# Patient Record
Sex: FEMALE | Race: WHITE | NOT HISPANIC OR LATINO | Employment: FULL TIME | URBAN - METROPOLITAN AREA
[De-identification: names, ages, dates, MRNs, and addresses within clinical notes are randomized per-mention and may not be internally consistent; named-entity substitution may affect disease eponyms.]

---

## 2019-08-07 DIAGNOSIS — R11.0 NAUSEA: Primary | ICD-10-CM

## 2019-08-07 RX ORDER — ONDANSETRON 8 MG/1
8 TABLET, ORALLY DISINTEGRATING ORAL EVERY 8 HOURS PRN
Qty: 90 TABLET | Refills: 0 | Status: SHIPPED | OUTPATIENT
Start: 2019-08-07 | End: 2020-02-12 | Stop reason: SDUPTHER

## 2019-09-26 RX ORDER — ZOLPIDEM TARTRATE 10 MG/1
10 TABLET ORAL NIGHTLY PRN
COMMUNITY
End: 2019-12-18

## 2019-09-26 RX ORDER — HYOSCYAMINE SULFATE 0.125 MG
TABLET,DISINTEGRATING ORAL EVERY 4 HOURS PRN
COMMUNITY
End: 2019-09-30

## 2019-09-26 RX ORDER — DULOXETIN HYDROCHLORIDE 30 MG/1
30 CAPSULE, DELAYED RELEASE ORAL DAILY
COMMUNITY
End: 2019-09-30 | Stop reason: SDUPTHER

## 2019-09-26 RX ORDER — DIPHENOXYLATE HYDROCHLORIDE AND ATROPINE SULFATE 2.5; .025 MG/1; MG/1
1 TABLET ORAL 4 TIMES DAILY PRN
COMMUNITY
End: 2020-05-06 | Stop reason: SDUPTHER

## 2019-09-26 RX ORDER — ARIPIPRAZOLE 10 MG/1
10 TABLET ORAL DAILY
COMMUNITY
End: 2020-12-31

## 2019-09-26 RX ORDER — DEXLANSOPRAZOLE 60 MG/1
60 CAPSULE, DELAYED RELEASE ORAL DAILY
COMMUNITY
End: 2019-09-30 | Stop reason: SDUPTHER

## 2019-09-26 RX ORDER — CLONAZEPAM 1 MG/1
1 TABLET ORAL 2 TIMES DAILY PRN
COMMUNITY
End: 2020-12-31

## 2019-09-26 RX ORDER — PROMETHAZINE HYDROCHLORIDE 25 MG/1
25 TABLET ORAL EVERY 6 HOURS PRN
COMMUNITY
End: 2019-09-30 | Stop reason: SDUPTHER

## 2019-09-26 RX ORDER — OXYBUTYNIN CHLORIDE 10 MG/1
10 TABLET, EXTENDED RELEASE ORAL DAILY
COMMUNITY
End: 2019-09-30

## 2019-09-30 ENCOUNTER — OFFICE VISIT (OUTPATIENT)
Dept: FAMILY MEDICINE CLINIC | Facility: CLINIC | Age: 59
End: 2019-09-30

## 2019-09-30 VITALS
BODY MASS INDEX: 17.99 KG/M2 | TEMPERATURE: 98.1 F | SYSTOLIC BLOOD PRESSURE: 120 MMHG | HEART RATE: 71 BPM | HEIGHT: 64 IN | WEIGHT: 105.4 LBS | DIASTOLIC BLOOD PRESSURE: 78 MMHG | OXYGEN SATURATION: 98 %

## 2019-09-30 DIAGNOSIS — R53.82 CHRONIC FATIGUE: ICD-10-CM

## 2019-09-30 DIAGNOSIS — F33.41 RECURRENT MAJOR DEPRESSIVE DISORDER, IN PARTIAL REMISSION (HCC): ICD-10-CM

## 2019-09-30 DIAGNOSIS — K21.9 GERD WITHOUT ESOPHAGITIS: Primary | ICD-10-CM

## 2019-09-30 DIAGNOSIS — Z87.891 HISTORY OF TOBACCO ABUSE: ICD-10-CM

## 2019-09-30 DIAGNOSIS — Z79.899 HIGH RISK MEDICATION USE: ICD-10-CM

## 2019-09-30 DIAGNOSIS — F41.1 GAD (GENERALIZED ANXIETY DISORDER): ICD-10-CM

## 2019-09-30 DIAGNOSIS — F51.01 PRIMARY INSOMNIA: ICD-10-CM

## 2019-09-30 PROCEDURE — 99214 OFFICE O/P EST MOD 30 MIN: CPT | Performed by: FAMILY MEDICINE

## 2019-09-30 RX ORDER — DULOXETIN HYDROCHLORIDE 30 MG/1
CAPSULE, DELAYED RELEASE ORAL
Qty: 90 CAPSULE | Refills: 5
Start: 2019-09-30 | End: 2021-06-07

## 2019-09-30 RX ORDER — PROMETHAZINE HYDROCHLORIDE 25 MG/1
25 TABLET ORAL EVERY 8 HOURS PRN
Qty: 45 TABLET | Refills: 5 | Status: SHIPPED | OUTPATIENT
Start: 2019-09-30 | End: 2021-02-17

## 2019-09-30 RX ORDER — DEXLANSOPRAZOLE 60 MG/1
60 CAPSULE, DELAYED RELEASE ORAL DAILY
Qty: 90 CAPSULE | Refills: 3 | Status: SHIPPED | OUTPATIENT
Start: 2019-09-30 | End: 2021-06-07 | Stop reason: SDUPTHER

## 2019-09-30 NOTE — PROGRESS NOTES
Chief Complaint   Patient presents with   • Weight Loss   • Fatigue   • Med Refill       Subjective   Christie Hendricks is a 58 y.o. female.     History of Present Illness   F/u VIKTOR  Doing well with meds.  No SE.   F/U JANEY with depression.  Doing well with meds.  Seeing psychiatry(Dr Herndon).  On clonazepam 1mg TID now through him.  I am functioning well now.  I am going through a divorce now.  I only eat one meal a day.  I try to snack a lot, though.    I have gained wt as I was 89 and now I am 89.    C/o fatigued often.  Going on a few months now.    F/u tobacco abuse.  I quit smoking 5 years ago.       The following portions of the patient's history were reviewed and updated as appropriate: allergies, current medications, past family history, past medical history, past social history, past surgical history and problem list.    Review of Systems   Constitutional: Positive for fatigue.   Respiratory: Positive for cough and shortness of breath. Negative for chest tightness.    Cardiovascular: Negative for chest pain and leg swelling.       Patient Active Problem List   Diagnosis   • Vitamin B deficiency   • Rectal sphincter incontinence   • Raynaud's phenomenon   • Primary insomnia   • Pill dysphagia   • Persistent insomnia   • Overactive bladder   • Pancreatic disorder   • Non-ulcer dyspepsia   • Irritable bowel syndrome with diarrhea   • Insomnia   • IBS (irritable bowel syndrome)   • Hyperactivity of bladder   • History of colon polyps   • GERD without esophagitis   • JANEY (generalized anxiety disorder)   • Former smoker   • Fatigue   • Esophageal spasm   • Elevated amylase   • Dysphasia   • Depression   • Circadian rhythm sleep disorder   • Carpal tunnel syndrome   • Anxiety   • Allergic rhinitis   • Adrenal gland anomaly       Allergies   Allergen Reactions   • Penicillins Unknown (See Comments)     unknown         Current Outpatient Medications:   •  ARIPiprazole (ABILIFY) 10 MG tablet, Take 10 mg by mouth  Daily., Disp: , Rfl:   •  clonazePAM (KlonoPIN) 1 MG tablet, Take 1 mg by mouth 2 (Two) Times a Day As Needed., Disp: , Rfl:   •  dexlansoprazole (DEXILANT) 60 MG capsule, Take 60 mg by mouth Daily., Disp: , Rfl:   •  diphenoxylate-atropine (LOMOTIL) 2.5-0.025 MG per tablet, Take 1 tablet by mouth 4 (Four) Times a Day As Needed., Disp: , Rfl:   •  DULoxetine (CYMBALTA) 30 MG capsule, 3 a day., Disp: 90 capsule, Rfl: 5  •  ondansetron ODT (ZOFRAN-ODT) 8 MG disintegrating tablet, Take 1 tablet by mouth Every 8 (Eight) Hours As Needed for Nausea or Vomiting., Disp: 90 tablet, Rfl: 0  •  promethazine (PHENERGAN) 25 MG tablet, Take 25 mg by mouth Every 6 (Six) Hours As Needed., Disp: , Rfl:   •  zolpidem (AMBIEN) 10 MG tablet, Take 10 mg by mouth At Night As Needed., Disp: , Rfl:     Past Medical History:   Diagnosis Date   • Abdominal cramping    • Abdominal pain, epigastric    • Abdominal pain, lower    • Acute bronchitis    • Acute frontal sinusitis     History of    • Acute gastroenteritis    • Acute sinusitis    • Adrenal gland anomaly    • Allergic rhinitis    • Anxiety    • Bloating    • Body aches    • Bowel incontinence    • Carpal tunnel syndrome    • Circadian rhythm sleep disorder    • Cough    • Decreased appetite    • Depression    • Diarrhea    • Dysphasia    • Elevated amylase    • Esophageal spasm    • Fatigue    • Former smoker    • JANEY (generalized anxiety disorder)    • GERD without esophagitis    • High risk medication use    • History of colon polyps    • Hyperactivity of bladder    • IBS (irritable bowel syndrome)    • Insomnia    • Irritable bowel syndrome with diarrhea    • Nausea    • Non-ulcer dyspepsia    • Overactive bladder    • Pancreatic disorder    • Persistent insomnia    • Pill dysphagia    • Primary insomnia    • Raynaud's phenomenon    • Rectal itching    • Rectal sphincter incontinence    • Shingles    • Vitamin B deficiency    • Weight loss        Past Surgical History:   Procedure  Laterality Date   • CHOLECYSTECTOMY     • COLONOSCOPY  2015    13 polyps removed   • TOOTH EXTRACTION     • UPPER GASTROINTESTINAL ENDOSCOPY      [facility]  - normal per patient       Family History   Problem Relation Age of Onset   • No Known Problems Mother    • No Known Problems Father    • No Known Problems Other        Social History     Tobacco Use   • Smoking status: Former Smoker     Packs/day: 1.00     Years: 30.00     Pack years: 30.00     Types: Cigarettes     Last attempt to quit: 2014     Years since quittin.0   • Smokeless tobacco: Never Used   Substance Use Topics   • Alcohol use: Yes     Comment: social alcohol use            Objective     Vitals:    19 1114   BP: 120/78   Pulse: 71   Temp: 98.1 °F (36.7 °C)   SpO2: 98%     Body mass index is 18.09 kg/m².    Physical Exam   Constitutional: She is oriented to person, place, and time. She appears well-developed and well-nourished.   HENT:   Head: Normocephalic and atraumatic.   Right Ear: External ear normal.   Left Ear: External ear normal.   Nose: Nose normal.   Mouth/Throat: Oropharynx is clear and moist.   Eyes: Conjunctivae and EOM are normal. Pupils are equal, round, and reactive to light.   Neck: Normal range of motion. Neck supple. No tracheal deviation present. No thyromegaly present.   Cardiovascular: Normal rate, regular rhythm and normal heart sounds. Exam reveals no gallop and no friction rub.   No murmur heard.  Pulmonary/Chest: Effort normal and breath sounds normal. No respiratory distress. She exhibits no tenderness.   Abdominal: Soft. Bowel sounds are normal. She exhibits no distension. There is no tenderness.   Musculoskeletal: Normal range of motion. She exhibits no edema or tenderness.   Lymphadenopathy:     She has no cervical adenopathy.   Neurological: She is alert and oriented to person, place, and time. She displays normal reflexes. No cranial nerve deficit or sensory deficit. Coordination  normal.   Skin: Skin is warm and dry.   Psychiatric: She has a normal mood and affect. Her behavior is normal. Judgment and thought content normal.   Nursing note and vitals reviewed.      No results found for: GLUCOSE, BUN, CREATININE, EGFRIFNONA, EGFRIFAFRI, BCR, K, CO2, CALCIUM, PROTENTOTREF, ALBUMIN, LABIL2, AST, ALT    No results found for: WBC, RBC, HGB, HCT, MCV, MCH, MCHC, RDW, RDWSD, MPV, PLT, NEUTRORELPCT, LYMPHORELPCT, MONORELPCT, EOSRELPCT, BASORELPCT, AUTOIGPER, NEUTROABS, LYMPHSABS, MONOSABS, EOSABS, BASOSABS, AUTOIGNUM, NRBC    No results found for: HGBA1C    No results found for: [id number]    No results found for: TSH    No results found for: CHOL  No results found for: TRIG  No results found for: HDL  No results found for: LDL  No results found for: VLDL  No results found for: LDLHDL      Procedures    Assessment/Plan   Problems Addressed this Visit        Digestive    GERD without esophagitis - Primary    Relevant Medications    dexlansoprazole (DEXILANT) 60 MG capsule       Other    Insomnia    JANEY (generalized anxiety disorder)    Relevant Medications    zolpidem (AMBIEN) 10 MG tablet    ARIPiprazole (ABILIFY) 10 MG tablet    DULoxetine (CYMBALTA) 30 MG capsule    Fatigue    Relevant Orders    Comprehensive Metabolic Panel    CBC & Differential    TSH Rfx On Abnormal To Free T4    Vitamin B12    Depression    Relevant Medications    zolpidem (AMBIEN) 10 MG tablet    ARIPiprazole (ABILIFY) 10 MG tablet    DULoxetine (CYMBALTA) 30 MG capsule      Other Visit Diagnoses     History of tobacco abuse        Relevant Orders    CT Chest Low Dose Wo    High risk medication use        Relevant Orders    Lipid Panel With / Chol / HDL Ratio          Orders Placed This Encounter   Procedures   • CT Chest Low Dose Wo     Standing Status:   Future     Standing Expiration Date:   9/29/2020     Order Specific Question:   The patient is age 55-77:     Answer:   58     Order Specific Question:   The patient is a  current smoker?     Answer:   No     Order Specific Question:   The patient is a former smoker who has quit within the last 15 years?     Answer:   Yes     Order Specific Question:   The number of years since quitting smoking.     Answer:   5     Order Specific Question:   The patient has a smoking history of 30 pack-years or greater:     Answer:   Yes     Order Specific Question:   Actual pack - year smoking history (number):     Answer:   30     Order Specific Question:   Has the Patient had a Chest CT scan within the past 12 months?     Answer:   No     Order Specific Question:   Does the patient have any clinical signs/symptoms of lung cancer?     Answer:   No     Order Specific Question:   The patient was engaged in shared decision-making for this test:     Answer:   Yes   • Comprehensive Metabolic Panel   • TSH Rfx On Abnormal To Free T4   • Vitamin B12   • Lipid Panel With / Chol / HDL Ratio   • CBC & Differential     Order Specific Question:   Manual Differential     Answer:   No       Current Outpatient Medications   Medication Sig Dispense Refill   • ARIPiprazole (ABILIFY) 10 MG tablet Take 10 mg by mouth Daily.     • clonazePAM (KlonoPIN) 1 MG tablet Take 1 mg by mouth 2 (Two) Times a Day As Needed.     • dexlansoprazole (DEXILANT) 60 MG capsule Take 60 mg by mouth Daily.     • diphenoxylate-atropine (LOMOTIL) 2.5-0.025 MG per tablet Take 1 tablet by mouth 4 (Four) Times a Day As Needed.     • DULoxetine (CYMBALTA) 30 MG capsule 3 a day. 90 capsule 5   • ondansetron ODT (ZOFRAN-ODT) 8 MG disintegrating tablet Take 1 tablet by mouth Every 8 (Eight) Hours As Needed for Nausea or Vomiting. 90 tablet 0   • promethazine (PHENERGAN) 25 MG tablet Take 25 mg by mouth Every 6 (Six) Hours As Needed.     • zolpidem (AMBIEN) 10 MG tablet Take 10 mg by mouth At Night As Needed.       No current facility-administered medications for this visit.        Christie Hendricks had no medications administered during this  visit.    Return in about 3 months (around 12/30/2019).    There are no Patient Instructions on file for this visit.

## 2019-10-01 LAB
ALBUMIN SERPL-MCNC: 4.2 G/DL (ref 3.5–5.2)
ALBUMIN/GLOB SERPL: 1.6 G/DL
ALP SERPL-CCNC: 102 U/L (ref 39–117)
ALT SERPL-CCNC: 17 U/L (ref 1–33)
AST SERPL-CCNC: 23 U/L (ref 1–32)
BASOPHILS # BLD AUTO: 0.08 10*3/MM3 (ref 0–0.2)
BASOPHILS NFR BLD AUTO: 1.1 % (ref 0–1.5)
BILIRUB SERPL-MCNC: 0.3 MG/DL (ref 0.2–1.2)
BUN SERPL-MCNC: 10 MG/DL (ref 6–20)
BUN/CREAT SERPL: 13.2 (ref 7–25)
CALCIUM SERPL-MCNC: 9.4 MG/DL (ref 8.6–10.5)
CHLORIDE SERPL-SCNC: 101 MMOL/L (ref 98–107)
CHOLEST SERPL-MCNC: 193 MG/DL (ref 0–200)
CHOLEST/HDLC SERPL: 2.38 {RATIO}
CO2 SERPL-SCNC: 32.2 MMOL/L (ref 22–29)
CREAT SERPL-MCNC: 0.76 MG/DL (ref 0.57–1)
EOSINOPHIL # BLD AUTO: 0.06 10*3/MM3 (ref 0–0.4)
EOSINOPHIL NFR BLD AUTO: 0.8 % (ref 0.3–6.2)
ERYTHROCYTE [DISTWIDTH] IN BLOOD BY AUTOMATED COUNT: 13.1 % (ref 12.3–15.4)
GLOBULIN SER CALC-MCNC: 2.7 GM/DL
GLUCOSE SERPL-MCNC: 83 MG/DL (ref 65–99)
HCT VFR BLD AUTO: 45.5 % (ref 34–46.6)
HDLC SERPL-MCNC: 81 MG/DL (ref 40–60)
HGB BLD-MCNC: 14.6 G/DL (ref 12–15.9)
IMM GRANULOCYTES # BLD AUTO: 0.03 10*3/MM3 (ref 0–0.05)
IMM GRANULOCYTES NFR BLD AUTO: 0.4 % (ref 0–0.5)
LDLC SERPL CALC-MCNC: 98 MG/DL (ref 0–100)
LYMPHOCYTES # BLD AUTO: 1.78 10*3/MM3 (ref 0.7–3.1)
LYMPHOCYTES NFR BLD AUTO: 23.5 % (ref 19.6–45.3)
MCH RBC QN AUTO: 28.9 PG (ref 26.6–33)
MCHC RBC AUTO-ENTMCNC: 32.1 G/DL (ref 31.5–35.7)
MCV RBC AUTO: 90.1 FL (ref 79–97)
MONOCYTES # BLD AUTO: 0.44 10*3/MM3 (ref 0.1–0.9)
MONOCYTES NFR BLD AUTO: 5.8 % (ref 5–12)
NEUTROPHILS # BLD AUTO: 5.19 10*3/MM3 (ref 1.7–7)
NEUTROPHILS NFR BLD AUTO: 68.4 % (ref 42.7–76)
NRBC BLD AUTO-RTO: 0 /100 WBC (ref 0–0.2)
PLATELET # BLD AUTO: 253 10*3/MM3 (ref 140–450)
POTASSIUM SERPL-SCNC: 4.7 MMOL/L (ref 3.5–5.2)
PROT SERPL-MCNC: 6.9 G/DL (ref 6–8.5)
RBC # BLD AUTO: 5.05 10*6/MM3 (ref 3.77–5.28)
SODIUM SERPL-SCNC: 142 MMOL/L (ref 136–145)
TRIGL SERPL-MCNC: 72 MG/DL (ref 0–150)
TSH SERPL DL<=0.005 MIU/L-ACNC: 0.75 UIU/ML (ref 0.27–4.2)
VIT B12 SERPL-MCNC: 330 PG/ML (ref 211–946)
VLDLC SERPL CALC-MCNC: 14.4 MG/DL
WBC # BLD AUTO: 7.58 10*3/MM3 (ref 3.4–10.8)

## 2019-10-11 DIAGNOSIS — Z87.891 HISTORY OF TOBACCO ABUSE: ICD-10-CM

## 2019-12-18 ENCOUNTER — OFFICE VISIT (OUTPATIENT)
Dept: FAMILY MEDICINE CLINIC | Facility: CLINIC | Age: 59
End: 2019-12-18

## 2019-12-18 VITALS
HEIGHT: 64 IN | OXYGEN SATURATION: 98 % | TEMPERATURE: 97.1 F | BODY MASS INDEX: 17.58 KG/M2 | HEART RATE: 71 BPM | WEIGHT: 103 LBS | SYSTOLIC BLOOD PRESSURE: 154 MMHG | DIASTOLIC BLOOD PRESSURE: 77 MMHG

## 2019-12-18 DIAGNOSIS — F41.1 GAD (GENERALIZED ANXIETY DISORDER): ICD-10-CM

## 2019-12-18 DIAGNOSIS — F33.41 RECURRENT MAJOR DEPRESSIVE DISORDER, IN PARTIAL REMISSION (HCC): ICD-10-CM

## 2019-12-18 DIAGNOSIS — N32.81 OVERACTIVE BLADDER: Primary | ICD-10-CM

## 2019-12-18 PROCEDURE — 99214 OFFICE O/P EST MOD 30 MIN: CPT | Performed by: FAMILY MEDICINE

## 2019-12-18 RX ORDER — OXYBUTYNIN CHLORIDE 10 MG/1
10 TABLET, EXTENDED RELEASE ORAL DAILY
COMMUNITY
End: 2019-12-18 | Stop reason: SDUPTHER

## 2019-12-18 RX ORDER — OXYBUTYNIN CHLORIDE 10 MG/1
10 TABLET, EXTENDED RELEASE ORAL DAILY
Qty: 30 TABLET | Refills: 11 | Status: SHIPPED | OUTPATIENT
Start: 2019-12-18 | End: 2021-06-07 | Stop reason: SDUPTHER

## 2019-12-18 NOTE — PROGRESS NOTES
"Chief Complaint   Patient presents with   • Anxiety   • Depression   • Insomnia       Subjective   Christie Hendricks is a 59 y.o. female.     History of Present Illness   F/u depression.   Mood doing well despite \"bad luck with car repairs recently\".  No SE with meds.  Down 2 lbs since last 2 months.  Decreased appetite still.  CT chest with 1mm and 2mm RUL nodules o/w normal.   I still eat only one meal a day.    F/U JANEY.  Doing well with meds. On clonazepam 1mg TID now htrough psychiatry.  On abilfy 10 a day.    fU insomnia.  On ambien now through psychiatry.    F/U OAB.  Doing well with oxybutynin.  Needs refill on htat.  No Se.       The following portions of the patient's history were reviewed and updated as appropriate: allergies, current medications, past family history, past medical history, past social history, past surgical history and problem list.    Review of Systems   Constitutional: Negative for appetite change and fatigue.   HENT: Negative for nosebleeds and sore throat.    Eyes: Negative for blurred vision and visual disturbance.   Respiratory: Negative for shortness of breath and wheezing.    Cardiovascular: Negative for chest pain and leg swelling.   Gastrointestinal: Negative for abdominal distention and abdominal pain.   Endocrine: Negative for cold intolerance and polyuria.   Genitourinary: Negative for dysuria and hematuria.   Musculoskeletal: Negative for arthralgias and myalgias.   Skin: Negative for color change and rash.   Neurological: Negative for weakness and confusion.   Psychiatric/Behavioral: Negative for agitation and depressed mood.       Patient Active Problem List   Diagnosis   • Vitamin B deficiency   • Rectal sphincter incontinence   • Raynaud's phenomenon   • Primary insomnia   • Pill dysphagia   • Persistent insomnia   • Overactive bladder   • Pancreatic disorder   • Non-ulcer dyspepsia   • Irritable bowel syndrome with diarrhea   • Insomnia   • IBS (irritable bowel syndrome) "   • Hyperactivity of bladder   • History of colon polyps   • GERD without esophagitis   • JANEY (generalized anxiety disorder)   • Former smoker   • Fatigue   • Esophageal spasm   • Elevated amylase   • Dysphasia   • Depression   • Circadian rhythm sleep disorder   • Carpal tunnel syndrome   • Anxiety   • Allergic rhinitis   • Adrenal gland anomaly       Allergies   Allergen Reactions   • Penicillins Unknown (See Comments)     unknown         Current Outpatient Medications:   •  ARIPiprazole (ABILIFY) 10 MG tablet, Take 10 mg by mouth Daily., Disp: , Rfl:   •  clonazePAM (KlonoPIN) 1 MG tablet, Take 1 mg by mouth 2 (Two) Times a Day As Needed., Disp: , Rfl:   •  dexlansoprazole (DEXILANT) 60 MG capsule, Take 1 capsule by mouth Daily., Disp: 90 capsule, Rfl: 3  •  diphenoxylate-atropine (LOMOTIL) 2.5-0.025 MG per tablet, Take 1 tablet by mouth 4 (Four) Times a Day As Needed., Disp: , Rfl:   •  DULoxetine (CYMBALTA) 30 MG capsule, 3 a day., Disp: 90 capsule, Rfl: 5  •  ondansetron ODT (ZOFRAN-ODT) 8 MG disintegrating tablet, Take 1 tablet by mouth Every 8 (Eight) Hours As Needed for Nausea or Vomiting., Disp: 90 tablet, Rfl: 0  •  oxybutynin XL (DITROPAN-XL) 10 MG 24 hr tablet, Take 1 tablet by mouth Daily., Disp: 30 tablet, Rfl: 11  •  promethazine (PHENERGAN) 25 MG tablet, Take 1 tablet by mouth Every 8 (Eight) Hours As Needed for Nausea., Disp: 45 tablet, Rfl: 5    Past Medical History:   Diagnosis Date   • Abdominal cramping    • Abdominal pain, epigastric    • Abdominal pain, lower    • Acute bronchitis    • Acute frontal sinusitis     History of    • Acute gastroenteritis    • Acute sinusitis    • Adrenal gland anomaly    • Allergic rhinitis    • Anxiety    • Bloating    • Body aches    • Bowel incontinence    • Carpal tunnel syndrome    • Circadian rhythm sleep disorder    • Cough    • Decreased appetite    • Depression    • Diarrhea    • Dysphasia    • Elevated amylase    • Esophageal spasm    • Fatigue    •  Former smoker    • JANEY (generalized anxiety disorder)    • GERD without esophagitis    • High risk medication use    • History of colon polyps    • Hyperactivity of bladder    • IBS (irritable bowel syndrome)    • Insomnia    • Irritable bowel syndrome with diarrhea    • Nausea    • Non-ulcer dyspepsia    • Overactive bladder    • Pancreatic disorder    • Persistent insomnia    • Pill dysphagia    • Primary insomnia    • Raynaud's phenomenon    • Rectal itching    • Rectal sphincter incontinence    • Shingles    • Vitamin B deficiency    • Weight loss        Past Surgical History:   Procedure Laterality Date   • CHOLECYSTECTOMY     • COLONOSCOPY  2015    13 polyps removed   • TOOTH EXTRACTION     • [facility]  [facility]       Family History   Problem Relation Age of Onset   • No Known Problems Mother    • No Known Problems Father    • No Known Problems Other        Social History     Tobacco Use   • Smoking status: Former Smoker     Packs/day: 1.00     Years: 30.00     Pack years: 30.00     Types: Cigarettes     Last attempt to quit: 2014     Years since quittin.2   • Smokeless tobacco: Never Used   Substance Use Topics   • Alcohol use: Yes     Comment: social alcohol use            Objective     Vitals:    19 1557   BP: 154/77   Pulse: 71   Temp: 97.1 °F (36.2 °C)   SpO2: 98%     Body mass index is 17.68 kg/m².    Physical Exam   Constitutional: She is oriented to person, place, and time. She appears well-developed and well-nourished.   HENT:   Head: Normocephalic and atraumatic.   Right Ear: External ear normal.   Left Ear: External ear normal.   Nose: Nose normal.   Mouth/Throat: Oropharynx is clear and moist.   Eyes: Pupils are equal, round, and reactive to light. Conjunctivae and EOM are normal.   Neck: Normal range of motion. Neck supple. No tracheal deviation present. No thyromegaly present.   Cardiovascular: Normal rate,  regular rhythm and normal heart sounds. Exam reveals no gallop and no friction rub.   No murmur heard.  Pulmonary/Chest: Effort normal and breath sounds normal. No respiratory distress. She exhibits no tenderness.   Abdominal: Soft. Bowel sounds are normal. She exhibits no distension. There is no tenderness.   Musculoskeletal: Normal range of motion. She exhibits no edema or tenderness.   Lymphadenopathy:     She has no cervical adenopathy.   Neurological: She is alert and oriented to person, place, and time. She displays normal reflexes. No cranial nerve deficit or sensory deficit. Coordination normal.   Skin: Skin is warm and dry.   Psychiatric: She has a normal mood and affect. Her behavior is normal. Judgment and thought content normal.   Nursing note and vitals reviewed.      Lab Results   Component Value Date    BUN 10 09/30/2019    CREATININE 0.76 09/30/2019    EGFRIFNONA 78 09/30/2019    EGFRIFAFRI 95 09/30/2019    BCR 13.2 09/30/2019    K 4.7 09/30/2019    CO2 32.2 (H) 09/30/2019    CALCIUM 9.4 09/30/2019    PROTENTOTREF 6.9 09/30/2019    ALBUMIN 4.20 09/30/2019    LABIL2 1.6 09/30/2019    AST 23 09/30/2019    ALT 17 09/30/2019       WBC   Date Value Ref Range Status   09/30/2019 7.58 3.40 - 10.80 10*3/mm3 Final     RBC   Date Value Ref Range Status   09/30/2019 5.05 3.77 - 5.28 10*6/mm3 Final     Hemoglobin   Date Value Ref Range Status   09/30/2019 14.6 12.0 - 15.9 g/dL Final     Hematocrit   Date Value Ref Range Status   09/30/2019 45.5 34.0 - 46.6 % Final     MCV   Date Value Ref Range Status   09/30/2019 90.1 79.0 - 97.0 fL Final     MCH   Date Value Ref Range Status   09/30/2019 28.9 26.6 - 33.0 pg Final     MCHC   Date Value Ref Range Status   09/30/2019 32.1 31.5 - 35.7 g/dL Final     RDW   Date Value Ref Range Status   09/30/2019 13.1 12.3 - 15.4 % Final     Platelets   Date Value Ref Range Status   09/30/2019 253 140 - 450 10*3/mm3 Final     Neutrophil Rel %   Date Value Ref Range Status    09/30/2019 68.4 42.7 - 76.0 % Final     Lymphocyte Rel %   Date Value Ref Range Status   09/30/2019 23.5 19.6 - 45.3 % Final     Monocyte Rel %   Date Value Ref Range Status   09/30/2019 5.8 5.0 - 12.0 % Final     Eosinophil Rel %   Date Value Ref Range Status   09/30/2019 0.8 0.3 - 6.2 % Final     Basophil Rel %   Date Value Ref Range Status   09/30/2019 1.1 0.0 - 1.5 % Final     Neutrophils Absolute   Date Value Ref Range Status   09/30/2019 5.19 1.70 - 7.00 10*3/mm3 Final     Lymphocytes Absolute   Date Value Ref Range Status   09/30/2019 1.78 0.70 - 3.10 10*3/mm3 Final     Monocytes Absolute   Date Value Ref Range Status   09/30/2019 0.44 0.10 - 0.90 10*3/mm3 Final     Eosinophils Absolute   Date Value Ref Range Status   09/30/2019 0.06 0.00 - 0.40 10*3/mm3 Final     Basophils Absolute   Date Value Ref Range Status   09/30/2019 0.08 0.00 - 0.20 10*3/mm3 Final     nRBC   Date Value Ref Range Status   09/30/2019 0.0 0.0 - 0.2 /100 WBC Final       No results found for: HGBA1C    Lab Results   Component Value Date    XSZSHCHA15 330 09/30/2019       TSH   Date Value Ref Range Status   09/30/2019 0.748 0.270 - 4.200 uIU/mL Final       No results found for: CHOL  Lab Results   Component Value Date    TRIG 72 09/30/2019     Lab Results   Component Value Date    HDL 81 (H) 09/30/2019     Lab Results   Component Value Date    LDL 98 09/30/2019     Lab Results   Component Value Date    VLDL 14.4 09/30/2019     No results found for: LDLHDL      Procedures    Assessment/Plan   Problems Addressed this Visit        Genitourinary    Overactive bladder - Primary    Relevant Medications    oxybutynin XL (DITROPAN-XL) 10 MG 24 hr tablet       Other    JANEY (generalized anxiety disorder)    Depression          No orders of the defined types were placed in this encounter.      Current Outpatient Medications   Medication Sig Dispense Refill   • ARIPiprazole (ABILIFY) 10 MG tablet Take 10 mg by mouth Daily.     • clonazePAM  (KlonoPIN) 1 MG tablet Take 1 mg by mouth 2 (Two) Times a Day As Needed.     • dexlansoprazole (DEXILANT) 60 MG capsule Take 1 capsule by mouth Daily. 90 capsule 3   • diphenoxylate-atropine (LOMOTIL) 2.5-0.025 MG per tablet Take 1 tablet by mouth 4 (Four) Times a Day As Needed.     • DULoxetine (CYMBALTA) 30 MG capsule 3 a day. 90 capsule 5   • ondansetron ODT (ZOFRAN-ODT) 8 MG disintegrating tablet Take 1 tablet by mouth Every 8 (Eight) Hours As Needed for Nausea or Vomiting. 90 tablet 0   • oxybutynin XL (DITROPAN-XL) 10 MG 24 hr tablet Take 1 tablet by mouth Daily. 30 tablet 11   • promethazine (PHENERGAN) 25 MG tablet Take 1 tablet by mouth Every 8 (Eight) Hours As Needed for Nausea. 45 tablet 5     No current facility-administered medications for this visit.        Christie Hendricks had no medications administered during this visit.    Return in about 4 months (around 4/18/2020).    There are no Patient Instructions on file for this visit.  Flu UTD.      Encouraged pt to stay off ambien while on clonazepam. Increase calories to 2200 calories a day.

## 2020-02-10 ENCOUNTER — TELEPHONE (OUTPATIENT)
Dept: FAMILY MEDICINE CLINIC | Facility: CLINIC | Age: 60
End: 2020-02-10

## 2020-02-12 ENCOUNTER — OFFICE VISIT (OUTPATIENT)
Dept: FAMILY MEDICINE CLINIC | Facility: CLINIC | Age: 60
End: 2020-02-12

## 2020-02-12 VITALS
HEART RATE: 90 BPM | DIASTOLIC BLOOD PRESSURE: 98 MMHG | WEIGHT: 104 LBS | OXYGEN SATURATION: 98 % | HEIGHT: 64 IN | SYSTOLIC BLOOD PRESSURE: 170 MMHG | BODY MASS INDEX: 17.75 KG/M2 | TEMPERATURE: 97.4 F

## 2020-02-12 DIAGNOSIS — R11.0 NAUSEA: ICD-10-CM

## 2020-02-12 DIAGNOSIS — I10 ESSENTIAL HYPERTENSION: Primary | ICD-10-CM

## 2020-02-12 DIAGNOSIS — G93.9 BRAIN LESION: ICD-10-CM

## 2020-02-12 PROCEDURE — 99214 OFFICE O/P EST MOD 30 MIN: CPT | Performed by: FAMILY MEDICINE

## 2020-02-12 RX ORDER — AMLODIPINE BESYLATE 2.5 MG/1
2.5 TABLET ORAL DAILY
Qty: 90 TABLET | Refills: 3 | Status: SHIPPED | OUTPATIENT
Start: 2020-02-12 | End: 2020-04-08 | Stop reason: SDUPTHER

## 2020-02-12 RX ORDER — ONDANSETRON 8 MG/1
8 TABLET, ORALLY DISINTEGRATING ORAL EVERY 8 HOURS PRN
Qty: 30 TABLET | Refills: 2 | Status: SHIPPED | OUTPATIENT
Start: 2020-02-12 | End: 2020-12-28 | Stop reason: SDUPTHER

## 2020-02-12 RX ORDER — MIRTAZAPINE 15 MG/1
TABLET, FILM COATED ORAL
COMMUNITY
Start: 2020-02-03 | End: 2022-04-14 | Stop reason: ALTCHOICE

## 2020-02-12 NOTE — PROGRESS NOTES
"Chief Complaint   Patient presents with   • er f/u     pt was seen at [facility] 02/07/2020 for possible stroke        Subjective   Christie Hendricks is a 59 y.o. female.     History of Present Illness   F/U hospitalization for issue when woke up on 2/7 when woke up and felt like I had to urinate.  Noticed R arm numbness.  R leg felt like pins and needles and \"half numb\".  I felt weak as well.  I still have numbness in R arm and R leg as well with R leg numbness.  \"It has not went away\".  From ER visit  ER visit reviewed and CBC normal, A1C 6.0, ,  Trig 78,  HDL 72,  LDL 62,  Na 136, cr 0.8.  Echo normal. CT c spine with mild deg changes with post op changes wiht narrowing bilateral c4-5 and c5-6.  MRI brain with scattered T2 signal hyperintensities in periventricular and subcortical of chronic microangiopathic change vs demyelinating disease such as MS.  CXR normal.  CT angiogram of head and neck with aberant R subclavian, otherwise normal.  Checking BP at home and running /90-92.    The following portions of the patient's history were reviewed and updated as appropriate: allergies, current medications, past family history, past medical history, past social history, past surgical history and problem list.    Review of Systems   Constitutional: Negative for appetite change and fatigue.   HENT: Negative for nosebleeds and sore throat.    Eyes: Negative for blurred vision and visual disturbance.   Respiratory: Negative for shortness of breath and wheezing.    Cardiovascular: Negative for chest pain and leg swelling.   Gastrointestinal: Negative for abdominal distention and abdominal pain.   Endocrine: Negative for cold intolerance and polyuria.   Genitourinary: Negative for dysuria and hematuria.   Musculoskeletal: Negative for arthralgias and myalgias.   Skin: Negative for color change and rash.   Neurological: Positive for weakness and numbness. Negative for confusion. "   Psychiatric/Behavioral: Negative for agitation and depressed mood.       Patient Active Problem List   Diagnosis   • Vitamin B deficiency   • Rectal sphincter incontinence   • Raynaud's phenomenon   • Primary insomnia   • Pill dysphagia   • Persistent insomnia   • Overactive bladder   • Pancreatic disorder   • Non-ulcer dyspepsia   • Irritable bowel syndrome with diarrhea   • Insomnia   • IBS (irritable bowel syndrome)   • Hyperactivity of bladder   • History of colon polyps   • GERD without esophagitis   • JANEY (generalized anxiety disorder)   • Former smoker   • Fatigue   • Esophageal spasm   • Elevated amylase   • Dysphasia   • Depression   • Circadian rhythm sleep disorder   • Carpal tunnel syndrome   • Anxiety   • Allergic rhinitis   • Adrenal gland anomaly   • Essential hypertension   • Brain lesion       Allergies   Allergen Reactions   • Penicillins Unknown (See Comments)     unknown         Current Outpatient Medications:   •  ARIPiprazole (ABILIFY) 10 MG tablet, Take 10 mg by mouth Daily., Disp: , Rfl:   •  clonazePAM (KlonoPIN) 1 MG tablet, Take 1 mg by mouth 2 (Two) Times a Day As Needed., Disp: , Rfl:   •  dexlansoprazole (DEXILANT) 60 MG capsule, Take 1 capsule by mouth Daily., Disp: 90 capsule, Rfl: 3  •  diphenoxylate-atropine (LOMOTIL) 2.5-0.025 MG per tablet, Take 1 tablet by mouth 4 (Four) Times a Day As Needed., Disp: , Rfl:   •  DULoxetine (CYMBALTA) 30 MG capsule, 3 a day., Disp: 90 capsule, Rfl: 5  •  ondansetron ODT (ZOFRAN-ODT) 8 MG disintegrating tablet, Take 1 tablet by mouth Every 8 (Eight) Hours As Needed for Nausea or Vomiting., Disp: 30 tablet, Rfl: 2  •  oxybutynin XL (DITROPAN-XL) 10 MG 24 hr tablet, Take 1 tablet by mouth Daily., Disp: 30 tablet, Rfl: 11  •  promethazine (PHENERGAN) 25 MG tablet, Take 1 tablet by mouth Every 8 (Eight) Hours As Needed for Nausea., Disp: 45 tablet, Rfl: 5  •  amLODIPine (NORVASC) 2.5 MG tablet, Take 1 tablet by mouth Daily., Disp: 90 tablet, Rfl:  3  •  mirtazapine (REMERON) 15 MG tablet, TK SS TO ONE T PO QHS., Disp: , Rfl:     Past Medical History:   Diagnosis Date   • Abdominal cramping    • Abdominal pain, epigastric    • Abdominal pain, lower    • Acute bronchitis    • Acute frontal sinusitis     History of    • Acute gastroenteritis    • Acute sinusitis    • Adrenal gland anomaly    • Allergic rhinitis    • Anxiety    • Bloating    • Body aches    • Bowel incontinence    • Carpal tunnel syndrome    • Circadian rhythm sleep disorder    • Cough    • Decreased appetite    • Depression    • Diarrhea    • Dysphasia    • Elevated amylase    • Esophageal spasm    • Fatigue    • Former smoker    • JANEY (generalized anxiety disorder)    • GERD without esophagitis    • High risk medication use    • History of colon polyps    • Hyperactivity of bladder    • IBS (irritable bowel syndrome)    • Insomnia    • Irritable bowel syndrome with diarrhea    • Nausea    • Non-ulcer dyspepsia    • Overactive bladder    • Pancreatic disorder    • Persistent insomnia    • Pill dysphagia    • Primary insomnia    • Raynaud's phenomenon    • Rectal itching    • Rectal sphincter incontinence    • Shingles    • Vitamin B deficiency    • Weight loss        Past Surgical History:   Procedure Laterality Date   • CHOLECYSTECTOMY     • COLONOSCOPY  2015    13 polyps removed   • TOOTH EXTRACTION     • [facility]  - [facility] patient       Family History   Problem Relation Age of Onset   • No Known Problems Mother    • No Known Problems Father    • No Known Problems Other        Social History     Tobacco Use   • Smoking status: Former Smoker     Packs/day: 1.00     Years: 30.00     Pack years: 30.00     Types: Cigarettes     Last attempt to quit: 2014     Years since quittin.3   • Smokeless tobacco: Never Used   Substance Use Topics   • Alcohol use: Yes     Comment: social alcohol use            Objective     Vitals:     02/12/20 1007   BP: 170/98   Pulse: 90   Temp: 97.4 °F (36.3 °C)   SpO2: 98%     Body mass index is 17.85 kg/m².    Physical Exam   Constitutional: She is oriented to person, place, and time. She appears well-developed and well-nourished.   HENT:   Head: Normocephalic and atraumatic.   Mouth/Throat: Oropharynx is clear and moist.   Eyes: Pupils are equal, round, and reactive to light. No scleral icterus.   Neck: No thyromegaly present.   Cardiovascular: Normal rate and regular rhythm. Exam reveals no gallop and no friction rub.   No murmur heard.  Pulmonary/Chest: Effort normal. No respiratory distress. She has no wheezes. She has no rales. She exhibits no tenderness.   Abdominal: Soft. Bowel sounds are normal. She exhibits no distension. There is no tenderness.   Musculoskeletal: Normal range of motion. She exhibits no edema or deformity.   Lymphadenopathy:     She has no cervical adenopathy.   Neurological: She is alert and oriented to person, place, and time. No cranial nerve deficit or sensory deficit. She exhibits normal muscle tone. Coordination normal.   Skin: Skin is warm and dry. No rash noted. She is not diaphoretic.   Vitals reviewed.      Lab Results   Component Value Date    BUN 17 02/08/2020    CREATININE 0.8 02/08/2020    EGFRIFNONA 78 09/30/2019    EGFRIFAFRI 95 09/30/2019    BCR 21.3 02/08/2020    K 3.8 02/08/2020    CO2 32 (H) 02/08/2020    CALCIUM 8.6 02/08/2020    PROTENTOTREF 6.9 09/30/2019    ALBUMIN 4.3 02/07/2020    LABIL2 1.3 02/07/2020    AST 33 02/07/2020    ALT 22 02/07/2020       WBC   Date Value Ref Range Status   02/08/2020 5.27 4.5 - 11.0 10*3/uL Final     RBC   Date Value Ref Range Status   02/08/2020 4.82 4.0 - 5.2 10*6/uL Final     Hemoglobin   Date Value Ref Range Status   02/08/2020 14.1 12.0 - 16.0 g/dL Final     Hematocrit   Date Value Ref Range Status   02/08/2020 43.6 36.0 - 46.0 % Final     MCV   Date Value Ref Range Status   02/08/2020 90.5 80.0 - 100.0 fL Final     MCH    Date Value Ref Range Status   02/08/2020 29.3 26.0 - 34.0 pg Final     MCHC   Date Value Ref Range Status   02/08/2020 32.3 31.0 - 37.0 g/dL Final     RDW   Date Value Ref Range Status   02/08/2020 13.6 12.0 - 16.8 % Final     MPV   Date Value Ref Range Status   02/08/2020 12.7 (H) 6.7 - 10.8 fL Final     Platelets   Date Value Ref Range Status   02/08/2020 226 140 - 440 10*3/uL Final     Neutrophil Rel %   Date Value Ref Range Status   02/08/2020 56.3 45 - 80 % Final     Lymphocyte Rel %   Date Value Ref Range Status   02/08/2020 32.4 15 - 50 % Final     Monocyte Rel %   Date Value Ref Range Status   02/08/2020 7.8 0 - 15 % Final     Eosinophil %   Date Value Ref Range Status   02/08/2020 2.7 0 - 7 % Final     Basophil Rel %   Date Value Ref Range Status   02/08/2020 0.6 0 - 2 % Final     Immature Grans %   Date Value Ref Range Status   02/08/2020 0.2 (H) 0 % Final     Neutrophils Absolute   Date Value Ref Range Status   02/08/2020 2.97 2.0 - 8.8 10*3/uL Final     Lymphocytes Absolute   Date Value Ref Range Status   02/08/2020 1.71 0.7 - 5.5 10*3/uL Final     Monocytes Absolute   Date Value Ref Range Status   02/08/2020 0.41 0.0 - 1.7 10*3/uL Final     Eosinophils Absolute   Date Value Ref Range Status   02/08/2020 0.14 0.0 - 0.8 10*3/uL Final     Basophils Absolute   Date Value Ref Range Status   02/08/2020 0.03 0.0 - 0.2 10*3/uL Final     Immature Grans, Absolute   Date Value Ref Range Status   02/08/2020 0.01 <1 10*3/uL Final     nRBC   Date Value Ref Range Status   02/08/2020 0 0 /100(WBC) Final       Lab Results   Component Value Date    HGBA1C 6.0 (H) 02/08/2020       Lab Results   Component Value Date    OIGRRIJZ97 330 09/30/2019       TSH   Date Value Ref Range Status   09/30/2019 0.748 0.270 - 4.200 uIU/mL Final       No results found for: CHOL  Lab Results   Component Value Date    TRIG 78 02/08/2020     Lab Results   Component Value Date    HDL 72 02/08/2020     Lab Results   Component Value Date     LDL 62 02/08/2020     Lab Results   Component Value Date    VLDL 16 02/08/2020     No results found for: LDLHDL      Procedures    Assessment/Plan   Problems Addressed this Visit        Cardiovascular and Mediastinum    Essential hypertension - Primary    Relevant Medications    amLODIPine (NORVASC) 2.5 MG tablet       Nervous and Auditory    Brain lesion    Relevant Orders    Ambulatory Referral to Neurology      Other Visit Diagnoses     Nausea        Relevant Medications    ondansetron ODT (ZOFRAN-ODT) 8 MG disintegrating tablet        New diagnosis of HTN.    New lesions on MRI.  To neurology.    Orders Placed This Encounter   Procedures   • Ambulatory Referral to Neurology     Referral Priority:   Routine     Referral Type:   Consultation     Referral Reason:   Specialty Services Required     Requested Specialty:   Neurology     Number of Visits Requested:   1   Off work 2/7/20- 4/8/20.      Current Outpatient Medications   Medication Sig Dispense Refill   • ARIPiprazole (ABILIFY) 10 MG tablet Take 10 mg by mouth Daily.     • clonazePAM (KlonoPIN) 1 MG tablet Take 1 mg by mouth 2 (Two) Times a Day As Needed.     • dexlansoprazole (DEXILANT) 60 MG capsule Take 1 capsule by mouth Daily. 90 capsule 3   • diphenoxylate-atropine (LOMOTIL) 2.5-0.025 MG per tablet Take 1 tablet by mouth 4 (Four) Times a Day As Needed.     • DULoxetine (CYMBALTA) 30 MG capsule 3 a day. 90 capsule 5   • ondansetron ODT (ZOFRAN-ODT) 8 MG disintegrating tablet Take 1 tablet by mouth Every 8 (Eight) Hours As Needed for Nausea or Vomiting. 30 tablet 2   • oxybutynin XL (DITROPAN-XL) 10 MG 24 hr tablet Take 1 tablet by mouth Daily. 30 tablet 11   • promethazine (PHENERGAN) 25 MG tablet Take 1 tablet by mouth Every 8 (Eight) Hours As Needed for Nausea. 45 tablet 5   • amLODIPine (NORVASC) 2.5 MG tablet Take 1 tablet by mouth Daily. 90 tablet 3   • mirtazapine (REMERON) 15 MG tablet TK SS TO ONE T PO QHS.       No current facility-administered  medications for this visit.        Christie Ruthie had no medications administered during this visit.    Return in about 3 weeks (around 3/4/2020).    There are no Patient Instructions on file for this visit.

## 2020-02-24 ENCOUNTER — TELEPHONE (OUTPATIENT)
Dept: FAMILY MEDICINE CLINIC | Facility: CLINIC | Age: 60
End: 2020-02-24

## 2020-02-24 NOTE — TELEPHONE ENCOUNTER
Patient's , Art called and would like to speak with Dr Matos about Christie regarding her depression tonight if possible.  His number is [phone].

## 2020-02-24 NOTE — TELEPHONE ENCOUNTER
S/w pt regarding these issues, I explained to her that we have no control over another facilities schedule or anyway to know when they would be able to get her in. She wants to know what she should do and what her options are. Does not want to wait until may.     Also asked her about her fall, she stated that she feels okay today, no confusion or injuries otherwise.

## 2020-02-24 NOTE — TELEPHONE ENCOUNTER
Patient's appointment with the neurologist we referred her to is not till 05/20. We scheduled her with Dr Anirudh White.  She wants to know if there is someone else we could get her into sooner?  Her phone number is [phone].  Also, she fell out of bed yesterday and hit her head and twisted her ankle.  She says she didn't break the skin or anything but she felt a little disoriented and just wanted to talk to you about that as well please. She mentioned her sister was there to help her.

## 2020-02-25 NOTE — TELEPHONE ENCOUNTER
Patient called back and would like to talk to you again regarding the appointment and her fall.  I reread what was written, but she didn't seem to recall talking to you and would like for you to call her back please.  [phone].

## 2020-02-27 NOTE — TELEPHONE ENCOUNTER
Will try to get her into Dr. Vargas's office.   This is the only neurology office that can get them in soon,  And it is still a few weeks out.

## 2020-03-02 PROBLEM — R20.0 RIGHT ARM NUMBNESS: Status: ACTIVE | Noted: 2020-03-02

## 2020-03-05 ENCOUNTER — OFFICE VISIT (OUTPATIENT)
Dept: FAMILY MEDICINE CLINIC | Facility: CLINIC | Age: 60
End: 2020-03-05

## 2020-03-05 VITALS
WEIGHT: 113 LBS | BODY MASS INDEX: 19.29 KG/M2 | OXYGEN SATURATION: 98 % | DIASTOLIC BLOOD PRESSURE: 74 MMHG | HEIGHT: 64 IN | SYSTOLIC BLOOD PRESSURE: 132 MMHG | HEART RATE: 90 BPM | TEMPERATURE: 96.8 F

## 2020-03-05 DIAGNOSIS — F33.41 RECURRENT MAJOR DEPRESSIVE DISORDER, IN PARTIAL REMISSION (HCC): ICD-10-CM

## 2020-03-05 DIAGNOSIS — G93.9 BRAIN LESION: ICD-10-CM

## 2020-03-05 DIAGNOSIS — I10 ESSENTIAL HYPERTENSION: Primary | ICD-10-CM

## 2020-03-05 DIAGNOSIS — R20.0 RIGHT ARM NUMBNESS: ICD-10-CM

## 2020-03-05 PROBLEM — M77.8: Status: ACTIVE | Noted: 2020-03-05

## 2020-03-05 PROCEDURE — 99214 OFFICE O/P EST MOD 30 MIN: CPT | Performed by: FAMILY MEDICINE

## 2020-03-05 NOTE — PROGRESS NOTES
Chief Complaint   Patient presents with   • Hypertension   c/o pain in R arm.      Subjective   hCristie Hendricks is a 59 y.o. female.     History of Present Illness   F/U HTn.  No orthostasis.  Doing well with med.s  BP running 120-130/80s at home.  Tolerating amlodipine 2.5 a day now.    F/U R arm and R leg numbnss.  Still with numbness with pins and needles in R leg.  Still with some increased weakness in R arm.  MRI brain with scattered T2 signal hyperintensities in periventricular and subcortical of chronic microangiopathic change vs demyelinating disease such as MS. I can wash dishes and housework for 3 hours then I am just drained.   I need to rest.  I get nauseated and have trouble with concentration.    FU depression.  Doing well with duloxetine 30 2 a day now.    C/o pain in R upper arm for 12  weeks.  Shifting gears in car on manual.  Noticed after I got the car.  Slight worsening.        The following portions of the patient's history were reviewed and updated as appropriate: allergies, current medications, past family history, past medical history, past social history, past surgical history and problem list.    Review of Systems   Constitutional: Negative for appetite change and fatigue.   HENT: Negative for nosebleeds and sore throat.    Eyes: Negative for blurred vision and visual disturbance.   Respiratory: Negative for shortness of breath and wheezing.    Cardiovascular: Negative for chest pain and leg swelling.   Gastrointestinal: Negative for abdominal distention and abdominal pain.   Endocrine: Negative for cold intolerance and polyuria.   Genitourinary: Negative for dysuria and hematuria.   Musculoskeletal: Negative for arthralgias and myalgias.   Skin: Negative for color change and rash.   Neurological: Positive for weakness and numbness. Negative for confusion.   Psychiatric/Behavioral: Negative for agitation and depressed mood.       Patient Active Problem List   Diagnosis   • Vitamin B  deficiency   • Rectal sphincter incontinence   • Raynaud's phenomenon   • Primary insomnia   • Pill dysphagia   • Persistent insomnia   • Overactive bladder   • Pancreatic disorder   • Non-ulcer dyspepsia   • Irritable bowel syndrome with diarrhea   • Insomnia   • IBS (irritable bowel syndrome)   • Hyperactivity of bladder   • History of colon polyps   • GERD without esophagitis   • JANEY (generalized anxiety disorder)   • Former smoker   • Fatigue   • Esophageal spasm   • Elevated amylase   • Dysphasia   • Depression   • Circadian rhythm sleep disorder   • Carpal tunnel syndrome   • Anxiety   • Allergic rhinitis   • Adrenal gland anomaly   • Essential hypertension   • Brain lesion   • Right arm numbness   • Deltoid tendonitis, right       Allergies   Allergen Reactions   • Penicillins Unknown (See Comments)     unknown         Current Outpatient Medications:   •  amLODIPine (NORVASC) 2.5 MG tablet, Take 1 tablet by mouth Daily., Disp: 90 tablet, Rfl: 3  •  ARIPiprazole (ABILIFY) 10 MG tablet, Take 10 mg by mouth Daily., Disp: , Rfl:   •  clonazePAM (KlonoPIN) 1 MG tablet, Take 1 mg by mouth 2 (Two) Times a Day As Needed., Disp: , Rfl:   •  dexlansoprazole (DEXILANT) 60 MG capsule, Take 1 capsule by mouth Daily., Disp: 90 capsule, Rfl: 3  •  diphenoxylate-atropine (LOMOTIL) 2.5-0.025 MG per tablet, Take 1 tablet by mouth 4 (Four) Times a Day As Needed., Disp: , Rfl:   •  DULoxetine (CYMBALTA) 30 MG capsule, 3 a day., Disp: 90 capsule, Rfl: 5  •  mirtazapine (REMERON) 15 MG tablet, TK SS TO ONE T PO QHS., Disp: , Rfl:   •  ondansetron ODT (ZOFRAN-ODT) 8 MG disintegrating tablet, Take 1 tablet by mouth Every 8 (Eight) Hours As Needed for Nausea or Vomiting., Disp: 30 tablet, Rfl: 2  •  oxybutynin XL (DITROPAN-XL) 10 MG 24 hr tablet, Take 1 tablet by mouth Daily., Disp: 30 tablet, Rfl: 11  •  promethazine (PHENERGAN) 25 MG tablet, Take 1 tablet by mouth Every 8 (Eight) Hours As Needed for Nausea., Disp: 45 tablet, Rfl:  5    Past Medical History:   Diagnosis Date   • Abdominal cramping    • Abdominal pain, epigastric    • Abdominal pain, lower    • Acute bronchitis    • Acute frontal sinusitis     History of    • Acute gastroenteritis    • Acute sinusitis    • Adrenal gland anomaly    • Allergic rhinitis    • Anxiety    • Bloating    • Body aches    • Bowel incontinence    • Carpal tunnel syndrome    • Circadian rhythm sleep disorder    • Cough    • Decreased appetite    • Depression    • Diarrhea    • Dysphasia    • Elevated amylase    • Esophageal spasm    • Fatigue    • Former smoker    • JANEY (generalized anxiety disorder)    • GERD without esophagitis    • High risk medication use    • History of colon polyps    • Hyperactivity of bladder    • IBS (irritable bowel syndrome)    • Insomnia    • Irritable bowel syndrome with diarrhea    • Nausea    • Non-ulcer dyspepsia    • Overactive bladder    • Pancreatic disorder    • Persistent insomnia    • Pill dysphagia    • Primary insomnia    • Raynaud's phenomenon    • Rectal itching    • Rectal sphincter incontinence    • Shingles    • Vitamin B deficiency    • Weight loss        Past Surgical History:   Procedure Laterality Date   • CHOLECYSTECTOMY     • COLONOSCOPY  2015    13 polyps removed   • TOOTH EXTRACTION     • UPPER GASTROINTESTINAL ENDOSCOPY      [facility]  - normal per patient       Family History   Problem Relation Age of Onset   • No Known Problems Mother    • No Known Problems Father    • No Known Problems Other        Social History     Tobacco Use   • Smoking status: Former Smoker     Packs/day: 1.00     Years: 30.00     Pack years: 30.00     Types: Cigarettes     Last attempt to quit: 2014     Years since quittin.4   • Smokeless tobacco: Never Used   Substance Use Topics   • Alcohol use: Yes     Comment: social alcohol use            Objective     Vitals:    20 1312   BP: 132/74   Pulse: 90   Temp: 96.8 °F (36 °C)   SpO2: 98%      Body mass index is 19.39 kg/m².    Physical Exam   Constitutional: She is oriented to person, place, and time. She appears well-developed and well-nourished.   HENT:   Head: Normocephalic and atraumatic.   Right Ear: External ear normal.   Left Ear: External ear normal.   Nose: Nose normal.   Mouth/Throat: Oropharynx is clear and moist.   Eyes: Pupils are equal, round, and reactive to light. Conjunctivae and EOM are normal.   Neck: Normal range of motion. Neck supple. No tracheal deviation present. No thyromegaly present.   Cardiovascular: Normal rate, regular rhythm and normal heart sounds. Exam reveals no gallop and no friction rub.   No murmur heard.  Pulmonary/Chest: Effort normal and breath sounds normal. No respiratory distress. She exhibits no tenderness.   Abdominal: Soft. Bowel sounds are normal. She exhibits no distension. There is no tenderness.   Musculoskeletal: Normal range of motion. She exhibits tenderness. She exhibits no edema.   TTP over R deltoid.     Lymphadenopathy:     She has no cervical adenopathy.   Neurological: She is alert and oriented to person, place, and time. She displays normal reflexes. No cranial nerve deficit or sensory deficit. Coordination normal.   Skin: Skin is warm and dry.   Psychiatric: She has a normal mood and affect. Her behavior is normal. Judgment and thought content normal.   Nursing note and vitals reviewed.      Lab Results   Component Value Date    BUN 17 02/08/2020    CREATININE 0.8 02/08/2020    EGFRIFNONA 78 09/30/2019    EGFRIFAFRI 95 09/30/2019    BCR 21.3 02/08/2020    K 3.8 02/08/2020    CO2 32 (H) 02/08/2020    CALCIUM 8.6 02/08/2020    PROTENTOTREF 6.9 09/30/2019    ALBUMIN 4.3 02/07/2020    LABIL2 1.3 02/07/2020    AST 33 02/07/2020    ALT 22 02/07/2020       WBC   Date Value Ref Range Status   02/08/2020 5.27 4.5 - 11.0 10*3/uL Final     RBC   Date Value Ref Range Status   02/08/2020 4.82 4.0 - 5.2 10*6/uL Final     Hemoglobin   Date Value Ref Range  Status   02/08/2020 14.1 12.0 - 16.0 g/dL Final     Hematocrit   Date Value Ref Range Status   02/08/2020 43.6 36.0 - 46.0 % Final     MCV   Date Value Ref Range Status   02/08/2020 90.5 80.0 - 100.0 fL Final     MCH   Date Value Ref Range Status   02/08/2020 29.3 26.0 - 34.0 pg Final     MCHC   Date Value Ref Range Status   02/08/2020 32.3 31.0 - 37.0 g/dL Final     RDW   Date Value Ref Range Status   02/08/2020 13.6 12.0 - 16.8 % Final     MPV   Date Value Ref Range Status   02/08/2020 12.7 (H) 6.7 - 10.8 fL Final     Platelets   Date Value Ref Range Status   02/08/2020 226 140 - 440 10*3/uL Final     Neutrophil Rel %   Date Value Ref Range Status   02/08/2020 56.3 45 - 80 % Final     Lymphocyte Rel %   Date Value Ref Range Status   02/08/2020 32.4 15 - 50 % Final     Monocyte Rel %   Date Value Ref Range Status   02/08/2020 7.8 0 - 15 % Final     Eosinophil %   Date Value Ref Range Status   02/08/2020 2.7 0 - 7 % Final     Basophil Rel %   Date Value Ref Range Status   02/08/2020 0.6 0 - 2 % Final     Immature Grans %   Date Value Ref Range Status   02/08/2020 0.2 (H) 0 % Final     Neutrophils Absolute   Date Value Ref Range Status   02/08/2020 2.97 2.0 - 8.8 10*3/uL Final     Lymphocytes Absolute   Date Value Ref Range Status   02/08/2020 1.71 0.7 - 5.5 10*3/uL Final     Monocytes Absolute   Date Value Ref Range Status   02/08/2020 0.41 0.0 - 1.7 10*3/uL Final     Eosinophils Absolute   Date Value Ref Range Status   02/08/2020 0.14 0.0 - 0.8 10*3/uL Final     Basophils Absolute   Date Value Ref Range Status   02/08/2020 0.03 0.0 - 0.2 10*3/uL Final     Immature Grans, Absolute   Date Value Ref Range Status   02/08/2020 0.01 <1 10*3/uL Final     nRBC   Date Value Ref Range Status   02/08/2020 0 0 /100(WBC) Final       Lab Results   Component Value Date    HGBA1C 6.0 (H) 02/08/2020       Lab Results   Component Value Date    OXMSNXOV72 330 09/30/2019       TSH   Date Value Ref Range Status   09/30/2019 0.748 0.270  - 4.200 uIU/mL Final       No results found for: CHOL  Lab Results   Component Value Date    TRIG 78 02/08/2020     Lab Results   Component Value Date    HDL 72 02/08/2020     Lab Results   Component Value Date    LDL 62 02/08/2020     Lab Results   Component Value Date    VLDL 16 02/08/2020     No results found for: LDLHDL      Procedures    Assessment/Plan   Problems Addressed this Visit        Cardiovascular and Mediastinum    Essential hypertension - Primary       Nervous and Auditory    Brain lesion    Right arm numbness       Other    Depression        HTN.  Controlled.    Depression controlled.  Continue meds.    To neurology for brain lesions.   Off work 2/7-4/14/20.     No orders of the defined types were placed in this encounter.  Addendum:  Awaiting neurological specialty input.  Scheduled for 5/20/2020.  If symptoms resolve sooner may get to work sooner but reevaluate 4/14/20.      Current Outpatient Medications   Medication Sig Dispense Refill   • amLODIPine (NORVASC) 2.5 MG tablet Take 1 tablet by mouth Daily. 90 tablet 3   • ARIPiprazole (ABILIFY) 10 MG tablet Take 10 mg by mouth Daily.     • clonazePAM (KlonoPIN) 1 MG tablet Take 1 mg by mouth 2 (Two) Times a Day As Needed.     • dexlansoprazole (DEXILANT) 60 MG capsule Take 1 capsule by mouth Daily. 90 capsule 3   • diphenoxylate-atropine (LOMOTIL) 2.5-0.025 MG per tablet Take 1 tablet by mouth 4 (Four) Times a Day As Needed.     • DULoxetine (CYMBALTA) 30 MG capsule 3 a day. 90 capsule 5   • mirtazapine (REMERON) 15 MG tablet TK SS TO ONE T PO QHS.     • ondansetron ODT (ZOFRAN-ODT) 8 MG disintegrating tablet Take 1 tablet by mouth Every 8 (Eight) Hours As Needed for Nausea or Vomiting. 30 tablet 2   • oxybutynin XL (DITROPAN-XL) 10 MG 24 hr tablet Take 1 tablet by mouth Daily. 30 tablet 11   • promethazine (PHENERGAN) 25 MG tablet Take 1 tablet by mouth Every 8 (Eight) Hours As Needed for Nausea. 45 tablet 5     No current facility-administered  medications for this visit.        Christie Ruthie had no medications administered during this visit.    Return in about 1 month (around 4/5/2020).    There are no Patient Instructions on file for this visit.

## 2020-03-16 ENCOUNTER — TELEPHONE (OUTPATIENT)
Dept: FAMILY MEDICINE CLINIC | Facility: CLINIC | Age: 60
End: 2020-03-16

## 2020-03-16 NOTE — TELEPHONE ENCOUNTER
Please call patient about her short term disability forms & office visit from 3/5/20 to be sent to [place] asap

## 2020-03-17 NOTE — TELEPHONE ENCOUNTER
Pt wants to know if you can make an addendum to 3/5 office note and state that she is awaiting appointment with neurology. She stated supervisor from [facility] stated that we arent doing her diligence by getting her to neuro sooner.

## 2020-03-17 NOTE — TELEPHONE ENCOUNTER
Patient she didn't get your message yesterday because she was in the hospital and couldn't get service.  She is at home today and wants to know if you could call her back please.  Phone is [phone].

## 2020-04-06 ENCOUNTER — E-VISIT (OUTPATIENT)
Dept: FAMILY MEDICINE CLINIC | Facility: CLINIC | Age: 60
End: 2020-04-06

## 2020-04-06 DIAGNOSIS — J20.8 ACUTE BRONCHITIS DUE TO OTHER SPECIFIED ORGANISMS: Primary | ICD-10-CM

## 2020-04-06 PROCEDURE — 99024 POSTOP FOLLOW-UP VISIT: CPT | Performed by: FAMILY MEDICINE

## 2020-04-06 RX ORDER — AZITHROMYCIN 250 MG/1
TABLET, FILM COATED ORAL
Qty: 6 TABLET | Refills: 0 | Status: SHIPPED | OUTPATIENT
Start: 2020-04-06 | End: 2020-04-11

## 2020-04-06 RX ORDER — DEXTROMETHORPHAN POLISTIREX 30 MG/5ML
60 SUSPENSION ORAL EVERY 12 HOURS SCHEDULED
Qty: 280 ML | Refills: 1 | Status: SHIPPED | OUTPATIENT
Start: 2020-04-06 | End: 2021-09-02

## 2020-04-06 NOTE — PATIENT INSTRUCTIONS

## 2020-04-06 NOTE — PROGRESS NOTES
Christie Ruthie    1960  [id number]    I have reviewed the e-Visit questionnaire and patient's answers, my assessment and plan are as follows:    HPI  C/o less than one week history of cough. No better.  No fever.  Dry with off and on episodes.  Nature of cough is dry.  Hx of past smoker.      Review of Systems   Gen:  No fever. No appetite change.  Pulm: cough(dry),  Episodic,  Unknown if wheezing  CV:  No chest pain.      Diagnoses and all orders for this visit:    Acute bronchitis due to other specified organisms    Other orders  -     azithromycin (Zithromax Z-Deni) 250 MG tablet; Take 2 tablets the first day, then 1 tablet daily for 4 days.  -     dextromethorphan polistirex ER (DELSYM) 30 MG/5ML Suspension Extended Release oral suspension; Take 60 mg by mouth Every 12 (Twelve) Hours.    To ER if worsening.    Any medications prescribed have been sent electronically to   [id number] - [place], [place] - [address] AT [facility] [facility] OF [address] & [address] - [phone] PH - [phone] FX  [address] [address]  Phone: [phone] Fax: [phone]    [id number] - [place], [place] - [address] - [phone] PH - [phone] FX  [address]  [facility] [address]  Phone: [phone] Fax: [phone]        Trino Matos MD  04/06/20  12:17 PM

## 2020-04-08 ENCOUNTER — TELEMEDICINE (OUTPATIENT)
Dept: FAMILY MEDICINE CLINIC | Facility: CLINIC | Age: 60
End: 2020-04-08

## 2020-04-08 VITALS
SYSTOLIC BLOOD PRESSURE: 148 MMHG | DIASTOLIC BLOOD PRESSURE: 74 MMHG | BODY MASS INDEX: 19.9 KG/M2 | WEIGHT: 116 LBS | HEART RATE: 80 BPM

## 2020-04-08 DIAGNOSIS — R20.0 RIGHT ARM NUMBNESS: ICD-10-CM

## 2020-04-08 DIAGNOSIS — F70 MILD MENTAL SLOWING: ICD-10-CM

## 2020-04-08 DIAGNOSIS — G93.9 BRAIN LESION: ICD-10-CM

## 2020-04-08 DIAGNOSIS — M75.81 ROTATOR CUFF TENDONITIS, RIGHT: ICD-10-CM

## 2020-04-08 DIAGNOSIS — I10 ESSENTIAL HYPERTENSION: Primary | ICD-10-CM

## 2020-04-08 PROCEDURE — 99214 OFFICE O/P EST MOD 30 MIN: CPT | Performed by: FAMILY MEDICINE

## 2020-04-08 RX ORDER — MELOXICAM 15 MG/1
TABLET ORAL
Qty: 90 TABLET | Refills: 0 | Status: SHIPPED | OUTPATIENT
Start: 2020-04-08 | End: 2020-07-15

## 2020-04-08 RX ORDER — AMLODIPINE BESYLATE 5 MG/1
5 TABLET ORAL DAILY
Qty: 90 TABLET | Refills: 3 | Status: SHIPPED | OUTPATIENT
Start: 2020-04-08 | End: 2021-06-07 | Stop reason: SDUPTHER

## 2020-04-08 RX ORDER — MELOXICAM 15 MG/1
TABLET ORAL
Qty: 30 TABLET | Refills: 1 | Status: SHIPPED | OUTPATIENT
Start: 2020-04-08 | End: 2020-04-08

## 2020-04-08 NOTE — PROGRESS NOTES
Fu htn  Fu r arm and leg numbness    Subjective   Christie Hendricks is a 59 y.o. female.     History of Present Illness   Video visit due to covid 19.    F/u HTN.  BP running 140s/80s usually.    C/o persistent R leg and R arm numbness.  Numbness was better until yesterday and seemed to have come back to about as much as it was juan francisco in R leg.  Worsening thought processes as well.  Slowing mentation.  MRI reviewed with MRI brain with scattered T2 signal hyperintensities in periventricular and subcortical of chronic microangiopathic change vs demyelinating disease such as MS.  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  Fatigue as well.  Appt with neurology 5/20/20.  Need neurology input on issues.  C/o trouble with R shoulder pain.  Worse with certain movements.  Going on for 4 months.  No help with aleve tid for 2 weeks.  Using biofreeze.  Heating pad helps more than anything.  No known injury other than a new manual shift car.      The following portions of the patient's history were reviewed and updated as appropriate: allergies, current medications, past family history, past medical history, past social history, past surgical history and problem list.    Review of Systems   Constitutional: Positive for fatigue. Negative for chills, fever, unexpected weight gain and unexpected weight loss.   Respiratory: Negative for cough and chest tightness.    Cardiovascular: Negative for chest pain and palpitations.   Neurological: Positive for numbness and confusion.       Patient Active Problem List   Diagnosis   • Vitamin B deficiency   • Rectal sphincter incontinence   • Raynaud's phenomenon   • Primary insomnia   • Pill dysphagia   • Persistent insomnia   • Overactive bladder   • Pancreatic disorder   • Non-ulcer dyspepsia   • Irritable bowel syndrome with diarrhea   • Insomnia   • IBS (irritable bowel syndrome)   • Hyperactivity of bladder   • History of colon polyps   • GERD without esophagitis   •  JANEY (generalized anxiety disorder)   • Former smoker   • Fatigue   • Esophageal spasm   • Elevated amylase   • Dysphasia   • Depression   • Circadian rhythm sleep disorder   • Carpal tunnel syndrome   • Anxiety   • Allergic rhinitis   • Adrenal gland anomaly   • Essential hypertension   • Brain lesion   • Right arm numbness   • Deltoid tendonitis, right   • Acute bronchitis due to other specified organisms   • Mild mental slowing   • Rotator cuff tendonitis, right       Allergies   Allergen Reactions   • Penicillins Unknown (See Comments)     unknown         Current Outpatient Medications:   •  amLODIPine (NORVASC) 2.5 MG tablet, Take 1 tablet by mouth Daily., Disp: 90 tablet, Rfl: 3  •  ARIPiprazole (ABILIFY) 10 MG tablet, Take 10 mg by mouth Daily., Disp: , Rfl:   •  azithromycin (Zithromax Z-Deni) 250 MG tablet, Take 2 tablets the first day, then 1 tablet daily for 4 days., Disp: 6 tablet, Rfl: 0  •  clonazePAM (KlonoPIN) 1 MG tablet, Take 1 mg by mouth 2 (Two) Times a Day As Needed., Disp: , Rfl:   •  dexlansoprazole (DEXILANT) 60 MG capsule, Take 1 capsule by mouth Daily., Disp: 90 capsule, Rfl: 3  •  dextromethorphan polistirex ER (DELSYM) 30 MG/5ML Suspension Extended Release oral suspension, Take 60 mg by mouth Every 12 (Twelve) Hours., Disp: 280 mL, Rfl: 1  •  diphenoxylate-atropine (LOMOTIL) 2.5-0.025 MG per tablet, Take 1 tablet by mouth 4 (Four) Times a Day As Needed., Disp: , Rfl:   •  DULoxetine (CYMBALTA) 30 MG capsule, 3 a day., Disp: 90 capsule, Rfl: 5  •  mirtazapine (REMERON) 15 MG tablet, TK SS TO ONE T PO QHS., Disp: , Rfl:   •  ondansetron ODT (ZOFRAN-ODT) 8 MG disintegrating tablet, Take 1 tablet by mouth Every 8 (Eight) Hours As Needed for Nausea or Vomiting., Disp: 30 tablet, Rfl: 2  •  oxybutynin XL (DITROPAN-XL) 10 MG 24 hr tablet, Take 1 tablet by mouth Daily., Disp: 30 tablet, Rfl: 11  •  promethazine (PHENERGAN) 25 MG tablet, Take 1 tablet by mouth Every 8 (Eight) Hours As Needed for  Nausea., Disp: 45 tablet, Rfl: 5    Past Medical History:   Diagnosis Date   • Abdominal cramping    • Abdominal pain, epigastric    • Abdominal pain, lower    • Acute bronchitis    • Acute frontal sinusitis     History of    • Acute gastroenteritis    • Acute sinusitis    • Adrenal gland anomaly    • Allergic rhinitis    • Anxiety    • Bloating    • Body aches    • Bowel incontinence    • Carpal tunnel syndrome    • Circadian rhythm sleep disorder    • Cough    • Decreased appetite    • Depression    • Diarrhea    • Dysphasia    • Elevated amylase    • Esophageal spasm    • Fatigue    • Former smoker    • JANEY (generalized anxiety disorder)    • GERD without esophagitis    • High risk medication use    • History of colon polyps    • Hyperactivity of bladder    • IBS (irritable bowel syndrome)    • Insomnia    • Irritable bowel syndrome with diarrhea    • Nausea    • Non-ulcer dyspepsia    • Overactive bladder    • Pancreatic disorder    • Persistent insomnia    • Pill dysphagia    • Primary insomnia    • Raynaud's phenomenon    • Rectal itching    • Rectal sphincter incontinence    • Shingles    • Vitamin B deficiency    • Weight loss        Past Surgical History:   Procedure Laterality Date   • CHOLECYSTECTOMY     • COLONOSCOPY  2015    13 polyps removed   • TOOTH EXTRACTION     • [facility]  [facility]       Family History   Problem Relation Age of Onset   • No Known Problems Mother    • No Known Problems Father    • No Known Problems Other        Social History     Tobacco Use   • Smoking status: Former Smoker     Packs/day: 1.00     Years: 30.00     Pack years: 30.00     Types: Cigarettes     Last attempt to quit: 2014     Years since quittin.5   • Smokeless tobacco: Never Used   Substance Use Topics   • Alcohol use: Yes     Comment: social alcohol use            Objective     Vitals:    20 0856   BP: 148/74   Pulse: 80     Body  mass index is 19.9 kg/m².    Physical Exam   Constitutional: She is oriented to person, place, and time. She appears well-developed and well-nourished.   HENT:   Head: Normocephalic and atraumatic.   Pulmonary/Chest: Breath sounds normal.   Neurological: She is alert and oriented to person, place, and time.   NO facial droop.  Facial movements intact.  Slightly slowed speech but no slurring,       Lab Results   Component Value Date    BUN 17 02/08/2020    CREATININE 0.8 02/08/2020    EGFRIFNONA 78 09/30/2019    EGFRIFAFRI 95 09/30/2019    BCR 21.3 02/08/2020    K 3.8 02/08/2020    CO2 32 (H) 02/08/2020    CALCIUM 8.6 02/08/2020    PROTENTOTREF 6.9 09/30/2019    ALBUMIN 4.3 02/07/2020    LABIL2 1.3 02/07/2020    AST 33 02/07/2020    ALT 22 02/07/2020       WBC   Date Value Ref Range Status   02/08/2020 5.27 4.5 - 11.0 10*3/uL Final     RBC   Date Value Ref Range Status   02/08/2020 4.82 4.0 - 5.2 10*6/uL Final     Hemoglobin   Date Value Ref Range Status   02/08/2020 14.1 12.0 - 16.0 g/dL Final     Hematocrit   Date Value Ref Range Status   02/08/2020 43.6 36.0 - 46.0 % Final     MCV   Date Value Ref Range Status   02/08/2020 90.5 80.0 - 100.0 fL Final     MCH   Date Value Ref Range Status   02/08/2020 29.3 26.0 - 34.0 pg Final     MCHC   Date Value Ref Range Status   02/08/2020 32.3 31.0 - 37.0 g/dL Final     RDW   Date Value Ref Range Status   02/08/2020 13.6 12.0 - 16.8 % Final     MPV   Date Value Ref Range Status   02/08/2020 12.7 (H) 6.7 - 10.8 fL Final     Platelets   Date Value Ref Range Status   02/08/2020 226 140 - 440 10*3/uL Final     Neutrophil Rel %   Date Value Ref Range Status   02/08/2020 56.3 45 - 80 % Final     Lymphocyte Rel %   Date Value Ref Range Status   02/08/2020 32.4 15 - 50 % Final     Monocyte Rel %   Date Value Ref Range Status   02/08/2020 7.8 0 - 15 % Final     Eosinophil %   Date Value Ref Range Status   02/08/2020 2.7 0 - 7 % Final     Basophil Rel %   Date Value Ref Range Status    02/08/2020 0.6 0 - 2 % Final     Immature Grans %   Date Value Ref Range Status   02/08/2020 0.2 (H) 0 % Final     Neutrophils Absolute   Date Value Ref Range Status   02/08/2020 2.97 2.0 - 8.8 10*3/uL Final     Lymphocytes Absolute   Date Value Ref Range Status   02/08/2020 1.71 0.7 - 5.5 10*3/uL Final     Monocytes Absolute   Date Value Ref Range Status   02/08/2020 0.41 0.0 - 1.7 10*3/uL Final     Eosinophils Absolute   Date Value Ref Range Status   02/08/2020 0.14 0.0 - 0.8 10*3/uL Final     Basophils Absolute   Date Value Ref Range Status   02/08/2020 0.03 0.0 - 0.2 10*3/uL Final     Immature Grans, Absolute   Date Value Ref Range Status   02/08/2020 0.01 <1 10*3/uL Final     nRBC   Date Value Ref Range Status   02/08/2020 0 0 /100(WBC) Final       Lab Results   Component Value Date    HGBA1C 6.0 (H) 02/08/2020       Lab Results   Component Value Date    MHXPHGCC64 330 09/30/2019       TSH   Date Value Ref Range Status   09/30/2019 0.748 0.270 - 4.200 uIU/mL Final       No results found for: CHOL  Lab Results   Component Value Date    TRIG 78 02/08/2020     Lab Results   Component Value Date    HDL 72 02/08/2020     Lab Results   Component Value Date    LDL 62 02/08/2020     Lab Results   Component Value Date    VLDL 16 02/08/2020     No results found for: LDLHDL      Procedures    Assessment/Plan   Problems Addressed this Visit        Cardiovascular and Mediastinum    Essential hypertension - Primary       Nervous and Auditory    Brain lesion    Right arm numbness       Musculoskeletal and Integument    Rotator cuff tendonitis, right       Other    Mild mental slowing        Hypertension uncontrolled.  Increase amlodipine to 5 a day.    Worsening mentation issues noted today.  Need neurology input as possible MS vs other demyelinating issue possible.  Off work 2/7-6/1/20.  Neurology appt 5/20/20.  Will reevaluate 6/1/20.    No orders of the defined types were placed in this encounter.      Current Outpatient  Medications   Medication Sig Dispense Refill   • amLODIPine (NORVASC) 2.5 MG tablet Take 1 tablet by mouth Daily. 90 tablet 3   • ARIPiprazole (ABILIFY) 10 MG tablet Take 10 mg by mouth Daily.     • azithromycin (Zithromax Z-Deni) 250 MG tablet Take 2 tablets the first day, then 1 tablet daily for 4 days. 6 tablet 0   • clonazePAM (KlonoPIN) 1 MG tablet Take 1 mg by mouth 2 (Two) Times a Day As Needed.     • dexlansoprazole (DEXILANT) 60 MG capsule Take 1 capsule by mouth Daily. 90 capsule 3   • dextromethorphan polistirex ER (DELSYM) 30 MG/5ML Suspension Extended Release oral suspension Take 60 mg by mouth Every 12 (Twelve) Hours. 280 mL 1   • diphenoxylate-atropine (LOMOTIL) 2.5-0.025 MG per tablet Take 1 tablet by mouth 4 (Four) Times a Day As Needed.     • DULoxetine (CYMBALTA) 30 MG capsule 3 a day. 90 capsule 5   • mirtazapine (REMERON) 15 MG tablet TK SS TO ONE T PO QHS.     • ondansetron ODT (ZOFRAN-ODT) 8 MG disintegrating tablet Take 1 tablet by mouth Every 8 (Eight) Hours As Needed for Nausea or Vomiting. 30 tablet 2   • oxybutynin XL (DITROPAN-XL) 10 MG 24 hr tablet Take 1 tablet by mouth Daily. 30 tablet 11   • promethazine (PHENERGAN) 25 MG tablet Take 1 tablet by mouth Every 8 (Eight) Hours As Needed for Nausea. 45 tablet 5     No current facility-administered medications for this visit.        Christie Hendricks had no medications administered during this visit.    No follow-ups on file.    There are no Patient Instructions on file for this visit.

## 2020-04-08 NOTE — TELEPHONE ENCOUNTER
Was there a refill sent for this pt for meloxicam today? It shows a read receipt from the pharmacy but another part of today's visit shows pended

## 2020-05-06 DIAGNOSIS — K58.0 IRRITABLE BOWEL SYNDROME WITH DIARRHEA: Primary | ICD-10-CM

## 2020-05-07 RX ORDER — DIPHENOXYLATE HYDROCHLORIDE AND ATROPINE SULFATE 2.5; .025 MG/1; MG/1
1 TABLET ORAL 4 TIMES DAILY PRN
Qty: 90 TABLET | Refills: 2 | Status: SHIPPED | OUTPATIENT
Start: 2020-05-07 | End: 2020-10-26 | Stop reason: SDUPTHER

## 2020-05-11 PROBLEM — G45.9 TIA (TRANSIENT ISCHEMIC ATTACK): Status: ACTIVE | Noted: 2020-02-07

## 2020-05-11 NOTE — PROGRESS NOTES
Chief Complaint   Patient presents with   • Abnormal MRi Brain   None    Patient ID: Christie Hendricks is a 59 y.o. female.    HPI: Thank you for referring your patient see is here in the neurology clinic today.  As you may know she is a 59-year-old female being seen here at the request of Dr. Matos for history of right-sided weakness.  The patient states that she was in her usual state of health in February when she experienced onset of right leg and arm numbness and weakness.  She states that she noticed this as she tried to go to the bathroom in the middle of the night around 4 AM.  She says that she fell to the ground.  She ended up crawling to the bathroom.  Once she returned back to her bed she continued to experience the symptoms until EMS arrived.  Upon EMS arrival she was told that her blood pressure was elevated significantly in the 180s systolic.  She typically has blood pressures in the low 100s according to the patient.  She said that she also was experiencing a pretty severe headache at the time.  She was taken to a local emergency room for emergent evaluation.  She states that her blood pressure continued to stay elevated despite medical management.  During that visit she had an MRI of her brain which showed scattered T2 signal hyperintensities.  Some of these were periventricular, some of them subcortical.  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  Her symptoms resolved after a few hours.  She states that as it was improving as opposed to numbness and weakness she had tingling in that area.  Headache also improved with blood pressure.  She states that for the past several months she is been experiencing a significant amount of fatigue as well as difficulty with short-term memory.  She has noted this for quite some time, prior to the episode of right-sided weakness.  She does acknowledge being under significant stress.  She is in the process  "of separation from her  and her other stressors related to the COVID-19 pandemic.  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  She does have history of insomnia as well as depression and anxiety. Of note vitamin B12 level dating back to 9/30/2019 was 330.  She has not yet been on vitamin B12 replacement.  There are no issues with her thyroid.  She mentions that her mother suffered from stroke at the age of 35.  She denies any history of strokelike symptoms previously.  No history of double vision and trouble swallowing.  No significant balance or gait issues.  No previous history of discoordination.  She has no significant difficulties maintaining her finances.  She states that sometimes she gets hung up on calculation however that is quite temporary and she is able to perform calculations.  She describes it as a \"mental slowing\".  She is afraid that she may have dementia.  Her stepfather had dementia.  She feels that her biological father also may have had dementia.  She says that recently she experienced another headache that was pretty acute in onset.  She took her blood pressure and noted that her diastolic blood pressure was in the 180s.  She has been working with her primary care physician to better manage her blood pressure.    The following portions of the patient's history were reviewed and updated as appropriate: allergies, current medications, past family history, past medical history, past social history, past surgical history and problem list.    Review of Systems   Constitutional: Positive for activity change, fatigue and unexpected weight change.   HENT: Positive for dental problem, drooling and trouble swallowing.    Eyes: Positive for pain and visual disturbance. Negative for photophobia.   Respiratory: Positive for choking. Negative for shortness of breath and wheezing.    Cardiovascular: Negative for chest pain, palpitations " and leg swelling.   Gastrointestinal: Positive for diarrhea, nausea and vomiting.   Endocrine: Negative for cold intolerance, heat intolerance and polydipsia.   Musculoskeletal: Positive for myalgias. Negative for back pain and gait problem.   Skin: Negative for color change, pallor, rash and wound.   Allergic/Immunologic: Negative for environmental allergies, food allergies and immunocompromised state.   Neurological: Positive for dizziness, weakness, light-headedness, numbness and headaches. Negative for tremors, seizures, syncope, facial asymmetry and speech difficulty.   Hematological: Negative for adenopathy. Does not bruise/bleed easily.   Psychiatric/Behavioral: Positive for confusion, decreased concentration and sleep disturbance. Negative for agitation, behavioral problems, dysphoric mood, hallucinations, self-injury and suicidal ideas. The patient is nervous/anxious. The patient is not hyperactive.       I have reviewed the review of systems above performed by my medical assistant.    Vitals:    05/12/20 1423   BP: 112/72   Pulse: 78   SpO2: 98%       Neurologic Exam     Mental Status   Oriented to person, place, and time.   Registration: recalls 3 of 3 objects. Follows 3 step commands.   Attention: normal. Concentration: normal.   Speech: speech is normal   Level of consciousness: alert  Knowledge: consistent with education (No deficits found.).   Normal comprehension.     Cranial Nerves     CN II   Visual fields full to confrontation.     CN III, IV, VI   Pupils are equal, round, and reactive to light.  Extraocular motions are normal.   CN III: no CN III palsy  CN VI: no CN VI palsy  Nystagmus: none   Diplopia: none    CN V   Facial sensation intact.     CN VII   Facial expression full, symmetric.     CN VIII   CN VIII normal.     CN IX, X   CN IX normal.   CN X normal.     CN XI   CN XI normal.     CN XII   CN XII normal.     Motor Exam   Muscle bulk: normal  Right arm tone: normal  Left arm tone:  normal  Right leg tone: normal  Left leg tone: normal    Strength   Right neck flexion: 5/5  Left neck flexion: 5/5  Right neck extension: 5/5  Left neck extension: 5/5  Right deltoid: 5/5  Left deltoid: 5/5  Right biceps: 5/5  Left biceps: 5/5  Right triceps: 5/5  Left triceps: 5/5  Right wrist flexion: 5/5  Left wrist flexion: 5/5  Right wrist extension: 5/5  Left wrist extension: 5/5  Right interossei: 5/5  Left interossei: 5/5  Right abdominals: 5/5  Left abdominals: 5/5  Right iliopsoas: 5/5  Left iliopsoas: 5/5  Right quadriceps: 5/5  Left quadriceps: 5/5  Right hamstrin/5  Left hamstrin/5  Right glutei: 5/5  Left glutei: 5/5  Right anterior tibial: 5/5  Left anterior tibial: 5/5  Right posterior tibial: 5/5  Left posterior tibial: 5/5  Right peroneal: 5/5  Left peroneal: 5/5  Right gastroc: 5/5  Left gastroc: 5/5    Sensory Exam   Light touch normal.   Vibration normal.   Proprioception normal.   Pinprick normal.     Gait, Coordination, and Reflexes     Gait  Gait: normal    Coordination   Romberg: negative    Tremor   Resting tremor: absent  Intention tremor: absent    Reflexes   Right brachioradialis: 2+  Left brachioradialis: 2+  Right biceps: 2+  Left biceps: 2+  Right triceps: 2+  Left triceps: 2+  Right patellar: 2+  Left patellar: 2+  Right achilles: 2+  Left achilles: 2+  Right : 2+  Left : 2+Station is normal.       Physical Exam   Constitutional: She is oriented to person, place, and time. She appears well-developed. No distress.   HENT:   Head: Normocephalic and atraumatic.   Eyes: Pupils are equal, round, and reactive to light. EOM are normal.   Neck: Normal range of motion.   Cardiovascular: Normal rate, regular rhythm and normal heart sounds.   Pulmonary/Chest: Effort normal and breath sounds normal. No respiratory distress.   Abdominal: Soft. Bowel sounds are normal. She exhibits no distension. There is no tenderness.   Musculoskeletal: She exhibits no edema or deformity.    Neurological: She is oriented to person, place, and time. She has a normal Romberg Test. Gait normal.   Reflex Scores:       Tricep reflexes are 2+ on the right side and 2+ on the left side.       Bicep reflexes are 2+ on the right side and 2+ on the left side.       Brachioradialis reflexes are 2+ on the right side and 2+ on the left side.       Patellar reflexes are 2+ on the right side and 2+ on the left side.       Achilles reflexes are 2+ on the right side and 2+ on the left side.  Skin: Skin is warm. No rash noted.   Psychiatric: She has a normal mood and affect. Her speech is normal. Judgment normal.   Vitals reviewed.      Procedures    Assessment/Plan: Given her history, MRI findings and lab findings I feel that this is less likely to be a demyelinating disease process.  Therefore I will forego further testing in that regard i.e. lumbar puncture and spinal fluid analysis.  I feel that the episode she had in February was related to her blood pressure.  She sounds to have had hypertensive emergency.  Further blood pressure management would be beneficial to prevent further episodes.  Along with that I feel that she would likely benefit from vitamin B12 replacement which is likely causing the fatigue and cognitive impairment.  We will call in a vitamin B12 injection protocol for her.  We will watch her memory issues as well as the fatigue over time.  We did talk about stroke and TIA symptoms.  She is to go to the emergency room immediately if she were to experience new onset strokelike symptoms.  We will see her back in 4 months or sooner if needed.  A total of 60 minutes was spent discussing signs and symptoms of stroke, hypertensive emergency, B12 deficiency, dementia, patient education, plan of care and prognosis.     Christie was seen today for abnormal mri brain.    Diagnoses and all orders for this visit:    B12 deficiency  -     cyanocobalamin 1000 MCG/ML injection; Inject 1 mL into the appropriate  muscle as directed by prescriber Daily. 1 mL q week x 4 weeks, 1 mL qow x 4 mos    Hypertensive emergency    TIA (transient ischemic attack)           Anirudh White II, MD

## 2020-05-12 ENCOUNTER — OFFICE VISIT (OUTPATIENT)
Dept: NEUROLOGY | Facility: CLINIC | Age: 60
End: 2020-05-12

## 2020-05-12 VITALS
HEART RATE: 78 BPM | SYSTOLIC BLOOD PRESSURE: 112 MMHG | DIASTOLIC BLOOD PRESSURE: 72 MMHG | WEIGHT: 117 LBS | OXYGEN SATURATION: 98 % | HEIGHT: 64 IN | BODY MASS INDEX: 19.97 KG/M2

## 2020-05-12 DIAGNOSIS — E53.8 B12 DEFICIENCY: Primary | ICD-10-CM

## 2020-05-12 DIAGNOSIS — G45.9 TIA (TRANSIENT ISCHEMIC ATTACK): ICD-10-CM

## 2020-05-12 DIAGNOSIS — I16.1 HYPERTENSIVE EMERGENCY: ICD-10-CM

## 2020-05-12 PROCEDURE — 99244 OFF/OP CNSLTJ NEW/EST MOD 40: CPT | Performed by: PSYCHIATRY & NEUROLOGY

## 2020-05-13 RX ORDER — CYANOCOBALAMIN 1000 UG/ML
1000 INJECTION, SOLUTION INTRAMUSCULAR; SUBCUTANEOUS DAILY
Qty: 12 ML | Refills: 0 | Status: SHIPPED | OUTPATIENT
Start: 2020-05-13 | End: 2021-09-02

## 2020-06-22 ENCOUNTER — TELEPHONE (OUTPATIENT)
Dept: FAMILY MEDICINE CLINIC | Facility: CLINIC | Age: 60
End: 2020-06-22

## 2020-06-22 DIAGNOSIS — M75.81 ROTATOR CUFF TENDONITIS, RIGHT: ICD-10-CM

## 2020-06-22 DIAGNOSIS — M77.8 DELTOID TENDONITIS, RIGHT: ICD-10-CM

## 2020-06-22 DIAGNOSIS — Z12.39 BREAST CANCER SCREENING: Primary | ICD-10-CM

## 2020-06-22 RX ORDER — PREDNISONE 20 MG/1
TABLET ORAL
Qty: 18 TABLET | Refills: 0 | Status: SHIPPED | OUTPATIENT
Start: 2020-06-22 | End: 2021-06-07

## 2020-06-22 NOTE — TELEPHONE ENCOUNTER
Can you put order in for mammo please?     Also she wanted me to mention to you that the pain she is having in her arm.shoulder is not getting any better with the meloxicam, she can feel a grinding sensation when she moves it. Please advise

## 2020-07-15 RX ORDER — MELOXICAM 15 MG/1
TABLET ORAL
Qty: 90 TABLET | Refills: 0 | Status: SHIPPED | OUTPATIENT
Start: 2020-07-15 | End: 2021-09-02

## 2020-07-16 ENCOUNTER — OFFICE VISIT (OUTPATIENT)
Dept: ORTHOPEDIC SURGERY | Facility: CLINIC | Age: 60
End: 2020-07-16

## 2020-07-16 VITALS — HEIGHT: 64 IN | BODY MASS INDEX: 20.51 KG/M2 | TEMPERATURE: 97.9 F | WEIGHT: 120.1 LBS

## 2020-07-16 DIAGNOSIS — M25.511 RIGHT SHOULDER PAIN, UNSPECIFIED CHRONICITY: Primary | ICD-10-CM

## 2020-07-16 PROCEDURE — 20610 DRAIN/INJ JOINT/BURSA W/O US: CPT | Performed by: ORTHOPAEDIC SURGERY

## 2020-07-16 PROCEDURE — 99203 OFFICE O/P NEW LOW 30 MIN: CPT | Performed by: ORTHOPAEDIC SURGERY

## 2020-07-16 PROCEDURE — 73030 X-RAY EXAM OF SHOULDER: CPT | Performed by: ORTHOPAEDIC SURGERY

## 2020-07-16 RX ORDER — TRIAMCINOLONE ACETONIDE 40 MG/ML
80 INJECTION, SUSPENSION INTRA-ARTICULAR; INTRAMUSCULAR
Status: COMPLETED | OUTPATIENT
Start: 2020-07-16 | End: 2020-07-16

## 2020-07-16 RX ORDER — CLONAZEPAM 2 MG/1
TABLET ORAL
COMMUNITY
Start: 2020-06-22

## 2020-07-16 RX ADMIN — TRIAMCINOLONE ACETONIDE 80 MG: 40 INJECTION, SUSPENSION INTRA-ARTICULAR; INTRAMUSCULAR at 09:19

## 2020-07-16 NOTE — PROGRESS NOTES
New Right Shoulder      Patient: Christie Hendricks        YOB: 1960    Medical Record Number: [id number]        Chief Complaints: right shoulder pain       History of Present Illness: This is a 59-year-old female who presents complaining of right shoulder pain she is right-hand dominant been ongoing about 3 months no history injury change in activity she did get a new 6 speed car and is unsure if that is what caused that she does have a hard time shifting gears.  She has no night pain no history of similar symptoms she is a pharmacy tech symptoms are described as severe intermittent grinding stabbing aching burning swelling clicking worse with driving better with ice past medical history is remarkable for depression anxiety      Allergies:   Allergies   Allergen Reactions   • Penicillins Unknown (See Comments)     unknown       Medications:   Home Medications:  Current Outpatient Medications on File Prior to Visit   Medication Sig   • amLODIPine (NORVASC) 5 MG tablet Take 1 tablet by mouth Daily.   • ARIPiprazole (ABILIFY) 10 MG tablet Take 10 mg by mouth Daily.   • cyanocobalamin 1000 MCG/ML injection Inject 1 mL into the appropriate muscle as directed by prescriber Daily. 1 mL q week x 4 weeks, 1 mL qow x 4 mos   • dexlansoprazole (DEXILANT) 60 MG capsule Take 1 capsule by mouth Daily.   • diphenoxylate-atropine (LOMOTIL) 2.5-0.025 MG per tablet Take 1 tablet by mouth 4 (Four) Times a Day As Needed (cramping/diarrhea).   • DULoxetine (CYMBALTA) 30 MG capsule 3 a day.   • mirtazapine (REMERON) 15 MG tablet TK SS TO ONE T PO QHS.   • ondansetron ODT (ZOFRAN-ODT) 8 MG disintegrating tablet Take 1 tablet by mouth Every 8 (Eight) Hours As Needed for Nausea or Vomiting.   • oxybutynin XL (DITROPAN-XL) 10 MG 24 hr tablet Take 1 tablet by mouth Daily.   • promethazine (PHENERGAN) 25 MG tablet Take 1 tablet by mouth Every 8 (Eight) Hours As Needed for Nausea.   • clonazePAM (KlonoPIN) 1 MG tablet Take 1  mg by mouth 2 (Two) Times a Day As Needed.   • clonazePAM (KlonoPIN) 2 MG tablet TK 1/2 T PO QID PRN   • dextromethorphan polistirex ER (DELSYM) 30 MG/5ML Suspension Extended Release oral suspension Take 60 mg by mouth Every 12 (Twelve) Hours.   • meloxicam (MOBIC) 15 MG tablet TAKE 1 TABLET BY MOUTH EVERY DAY AS NEEDED FOR SHOULDER PAIN. DO NOT TAKE ANY OTHER NSAIDS   • predniSONE (DELTASONE) 20 MG tablet Take 3 tablets a day for 3 days then 2 tablets a day for 3 days then 1 tablet a day for 3 days.     No current facility-administered medications on file prior to visit.      Current Medications:  Scheduled Meds:  Continuous Infusions:  No current facility-administered medications for this visit.   PRN Meds:.    Past Medical History:   Diagnosis Date   • Abdominal cramping    • Abdominal pain, epigastric    • Abdominal pain, lower    • Acute bronchitis    • Acute frontal sinusitis     History of    • Acute gastroenteritis    • Acute sinusitis    • Adrenal gland anomaly    • Allergic rhinitis    • Anxiety    • Bloating    • Body aches    • Bowel incontinence    • Carpal tunnel syndrome    • Cervical spine disease    • Circadian rhythm sleep disorder    • Cough    • Decreased appetite    • Depression    • Diarrhea    • Difficulty walking    • Dysphasia    • Elevated amylase    • Esophageal spasm    • Fatigue    • Former smoker    • JANEY (generalized anxiety disorder)    • GERD without esophagitis    • Headache    • High risk medication use    • History of colon polyps    • Hyperactivity of bladder    • Hypertension    • IBS (irritable bowel syndrome)    • Insomnia    • Irritable bowel syndrome with diarrhea    • Nausea    • Non-ulcer dyspepsia    • Overactive bladder    • Pancreatic disorder    • Persistent insomnia    • Pill dysphagia    • Primary insomnia    • Raynaud's phenomenon    • Rectal itching    • Rectal sphincter incontinence    • Shingles    • Vision loss    • Vitamin B deficiency    • Weight loss        "  Past Surgical History:   Procedure Laterality Date   • CHOLECYSTECTOMY     • COLONOSCOPY  2015    13 polyps removed   • NECK SURGERY     • TOOTH EXTRACTION     • TUBAL ABDOMINAL LIGATION     • UPPER GASTROINTESTINAL ENDOSCOPY      [facility]  - normal per patient        Social History     Occupational History   • Not on file   Tobacco Use   • Smoking status: Former Smoker     Packs/day: 1.00     Years: 30.00     Pack years: 30.00     Types: Cigarettes     Last attempt to quit: 2014     Years since quittin.7   • Smokeless tobacco: Never Used   Substance and Sexual Activity   • Alcohol use: Yes     Comment: social alcohol use//cafeine 2 cups of coffee 1 glass of tea daily    • Drug use: No   • Sexual activity: Defer      Social History     Social History Narrative   • Not on file        Family History   Problem Relation Age of Onset   • Arthritis Mother    • Aneurysm Mother    • Heart disease Mother    • Hypertension Mother    • Seizures Mother    • Stroke Mother    • Arthritis Father    • Heart disease Father    • Hypertension Father    • No Known Problems Other    • Arthritis Maternal Grandmother    • Cancer Maternal Grandmother    • Heart disease Maternal Grandmother    • Hypertension Maternal Grandmother    • No Known Problems Maternal Grandfather    • No Known Problems Paternal Grandmother    • No Known Problems Paternal Grandfather              Review of Systems: 14 point review of systems are remarkable for pertinent positives listed in the chart by the patient the remainder negative    Review of Systems      Physical Exam: 59 y.o. female  General Appearance:    Alert, cooperative, in no acute distress                   Vitals:    20 0846   Temp: 97.9 °F (36.6 °C)   Weight: 54.5 kg (120 lb 1.6 oz)   Height: 162.6 cm (64\")   PainSc: 10-Worst pain ever      Patient is alert and read ×3 no acute distress appears her above-listed at height weight and age.  Affect is normal " respiratory rate is normal unlabored. Heart rate regular rate rhythm, sclera, dentition and hearing are normal for the purpose of this exam.    Ortho Exam Physical exam of the right shoulder reveals no overlying skin changes no lymphedema no lymphadenopathy.  Patient has active flexion 180 with mild symptoms abduction is similar external rotation is to 50 and internal rotation to the upper lumbar spine with mild symptoms.  Patient has good rotator cuff strength 4+ over 5 with isometric strength testing with pain.  Patient has a positive impingement and a positive Hatfield sign.  Patient has good cervical range of motion which is full and asymptomatic no radicular symptoms.  Patient has a normal elbow exam.  Good distal pulses are present  Patient has pain with overhead activity and a positive Neer sign and a positive empty can sign , a positive drop arm and a definitive painful arc    Large Joint Arthrocentesis: R subacromial bursa  Date/Time: 7/16/2020 9:19 AM  Consent given by: patient  Site marked: site marked  Timeout: Immediately prior to procedure a time out was called to verify the correct patient, procedure, equipment, support staff and site/side marked as required   Supporting Documentation  Indications: pain   Procedure Details  Location: shoulder - R subacromial bursa  Preparation: Patient was prepped and draped in the usual sterile fashion  Needle size: 22 G  Approach: posterior  Medications administered: 4 mL lidocaine (cardiac); 80 mg triamcinolone acetonide 40 MG/ML  Patient tolerance: patient tolerated the procedure well with no immediate complications                Radiology:   AP, Scapular Y and Axillary Lateral of the right shoulder were ordered/reviewed to evauate shoulder pain.  No comparative films she has some acromioclavicular arthritis otherwise no acute pathology heads well-seated within the glenoid  Imaging Results (Most Recent)     Procedure Component Value Units Date/Time    XR Shoulder  2+ View Right [[id number]] Resulted:  07/16/20 0714     Updated:  07/16/20 0840    Impression:       Ordering physician's impression is located in the Encounter Note dated 07/16/20. X-ray performed in the DR room.          Assessment/Plan: Right shoulder pain I really think this is rotator cuff in origin she is tried meloxicam which did not really help her primary care put her on some prednisone which did help I think a subacromial injection is quite reasonable as a diagnostic and therapeutic tool she fails to improve we will pursue other means of testing  Cortisone Injection. See procedure note.  Cortisone Injection for DIAGNOSTIC and THERAPUTIC purposes.

## 2020-09-09 ENCOUNTER — TELEPHONE (OUTPATIENT)
Dept: NEUROLOGY | Facility: CLINIC | Age: 60
End: 2020-09-09

## 2020-09-09 NOTE — TELEPHONE ENCOUNTER
Received a call from patient who stated that Dr White wanted her to get her images from [facility] prior to coming into her next appt. She has been trying to get this process completed but is having some issues.     She states that Medical records will release these to us if someone from  Dr Goss office will contact them and request it then we will be able to get the images that we need.     Medical Records Dept  [phone] option 1    If we cannot get records by the 14th we may need to reschedule her.     If there are any questions please contact patient and discuss.     [phone]    Thank you

## 2020-10-26 DIAGNOSIS — K58.0 IRRITABLE BOWEL SYNDROME WITH DIARRHEA: ICD-10-CM

## 2020-10-26 RX ORDER — DIPHENOXYLATE HYDROCHLORIDE AND ATROPINE SULFATE 2.5; .025 MG/1; MG/1
1 TABLET ORAL 4 TIMES DAILY PRN
Qty: 90 TABLET | Refills: 0 | Status: SHIPPED | OUTPATIENT
Start: 2020-10-26 | End: 2020-12-31 | Stop reason: SDUPTHER

## 2020-12-27 DIAGNOSIS — R11.0 NAUSEA: ICD-10-CM

## 2020-12-28 RX ORDER — ONDANSETRON 8 MG/1
TABLET, ORALLY DISINTEGRATING ORAL
Qty: 30 TABLET | Refills: 2 | Status: SHIPPED | OUTPATIENT
Start: 2020-12-28 | End: 2021-08-03

## 2020-12-31 ENCOUNTER — TELEMEDICINE (OUTPATIENT)
Dept: FAMILY MEDICINE CLINIC | Facility: CLINIC | Age: 60
End: 2020-12-31

## 2020-12-31 VITALS
RESPIRATION RATE: 12 BRPM | WEIGHT: 120 LBS | HEIGHT: 64 IN | DIASTOLIC BLOOD PRESSURE: 70 MMHG | SYSTOLIC BLOOD PRESSURE: 110 MMHG | BODY MASS INDEX: 20.49 KG/M2

## 2020-12-31 DIAGNOSIS — K21.9 GERD WITHOUT ESOPHAGITIS: ICD-10-CM

## 2020-12-31 DIAGNOSIS — I10 ESSENTIAL HYPERTENSION: Primary | ICD-10-CM

## 2020-12-31 DIAGNOSIS — K58.0 IRRITABLE BOWEL SYNDROME WITH DIARRHEA: ICD-10-CM

## 2020-12-31 PROCEDURE — 99213 OFFICE O/P EST LOW 20 MIN: CPT | Performed by: FAMILY MEDICINE

## 2020-12-31 RX ORDER — DIPHENOXYLATE HYDROCHLORIDE AND ATROPINE SULFATE 2.5; .025 MG/1; MG/1
1 TABLET ORAL 4 TIMES DAILY PRN
Qty: 90 TABLET | Refills: 2 | Status: SHIPPED | OUTPATIENT
Start: 2020-12-31 | End: 2021-05-20 | Stop reason: SDUPTHER

## 2020-12-31 NOTE — PROGRESS NOTES
F/U HTN.      Subjective   Christie Hendricks is a 60 y.o. female.     History of Present Illness   Video visit due to Covid.  Consent obtained.  11-minute visit.  F/U HTN.  No orhtostasis.   F/U VIKTOR.  Doing well with dexilant daily.  No Se.    F/U depression.  Doing well with meds.  Still with some insomnia but prescribed mirtazapine and clonazepam by psychiatry.  No SI or HI.    F/U IBS.  Uses lomotil BID often.   Occ TID.      The following portions of the patient's history were reviewed and updated as appropriate: allergies, current medications, past family history, past medical history, past social history, past surgical history and problem list.    Review of Systems   Constitutional: Negative for appetite change and fatigue.   HENT: Negative for nosebleeds and sore throat.    Eyes: Negative for blurred vision and visual disturbance.   Respiratory: Negative for shortness of breath and wheezing.    Cardiovascular: Negative for chest pain and leg swelling.   Gastrointestinal: Positive for diarrhea. Negative for abdominal distention and abdominal pain.   Endocrine: Negative for cold intolerance and polyuria.   Genitourinary: Negative for dysuria and hematuria.   Musculoskeletal: Negative for arthralgias and myalgias.   Skin: Negative for color change and rash.   Neurological: Negative for weakness and confusion.   Psychiatric/Behavioral: Negative for agitation and depressed mood.       Patient Active Problem List   Diagnosis   • Vitamin B deficiency   • Rectal sphincter incontinence   • Raynaud's phenomenon   • Primary insomnia   • Pill dysphagia   • Persistent insomnia   • Overactive bladder   • Pancreatic disorder   • Non-ulcer dyspepsia   • Irritable bowel syndrome with diarrhea   • Insomnia   • IBS (irritable bowel syndrome)   • Hyperactivity of bladder   • History of colon polyps   • GERD without esophagitis   • JANEY (generalized anxiety disorder)   • Former smoker   • Fatigue   • Esophageal spasm   • Elevated  amylase   • Dysphasia   • Depression   • Circadian rhythm sleep disorder   • Carpal tunnel syndrome   • Anxiety   • Allergic rhinitis   • Adrenal gland anomaly   • Essential hypertension   • Brain lesion   • Right arm numbness   • Deltoid tendonitis, right   • Acute bronchitis due to other specified organisms   • Mild mental slowing   • Rotator cuff tendonitis, right   • TIA (transient ischemic attack)       Allergies   Allergen Reactions   • Penicillins Unknown (See Comments)     unknown         Current Outpatient Medications:   •  amLODIPine (NORVASC) 5 MG tablet, Take 1 tablet by mouth Daily., Disp: 90 tablet, Rfl: 3  •  clonazePAM (KlonoPIN) 2 MG tablet, TK 1/2 T PO QID PRN, Disp: , Rfl:   •  cyanocobalamin 1000 MCG/ML injection, Inject 1 mL into the appropriate muscle as directed by prescriber Daily. 1 mL q week x 4 weeks, 1 mL qow x 4 mos, Disp: 12 mL, Rfl: 0  •  dexlansoprazole (DEXILANT) 60 MG capsule, Take 1 capsule by mouth Daily., Disp: 90 capsule, Rfl: 3  •  dextromethorphan polistirex ER (DELSYM) 30 MG/5ML Suspension Extended Release oral suspension, Take 60 mg by mouth Every 12 (Twelve) Hours., Disp: 280 mL, Rfl: 1  •  diphenoxylate-atropine (LOMOTIL) 2.5-0.025 MG per tablet, Take 1 tablet by mouth 4 (Four) Times a Day As Needed (cramping/diarrhea)., Disp: 90 tablet, Rfl: 2  •  DULoxetine (CYMBALTA) 30 MG capsule, 3 a day., Disp: 90 capsule, Rfl: 5  •  meloxicam (MOBIC) 15 MG tablet, TAKE 1 TABLET BY MOUTH EVERY DAY AS NEEDED FOR SHOULDER PAIN. DO NOT TAKE ANY OTHER NSAIDS, Disp: 90 tablet, Rfl: 0  •  mirtazapine (REMERON) 15 MG tablet, TK SS TO ONE T PO QHS., Disp: , Rfl:   •  ondansetron ODT (ZOFRAN-ODT) 8 MG disintegrating tablet, TAKE 1 TABLET BY MOUTH EVERY 8 HOURS AS NEEDED FOR NAUSEA AND VOMITING, Disp: 30 tablet, Rfl: 2  •  oxybutynin XL (DITROPAN-XL) 10 MG 24 hr tablet, Take 1 tablet by mouth Daily., Disp: 30 tablet, Rfl: 11  •  predniSONE (DELTASONE) 20 MG tablet, Take 3 tablets a day for 3  days then 2 tablets a day for 3 days then 1 tablet a day for 3 days., Disp: 18 tablet, Rfl: 0  •  promethazine (PHENERGAN) 25 MG tablet, Take 1 tablet by mouth Every 8 (Eight) Hours As Needed for Nausea., Disp: 45 tablet, Rfl: 5    Past Medical History:   Diagnosis Date   • Abdominal cramping    • Abdominal pain, epigastric    • Abdominal pain, lower    • Acute bronchitis    • Acute frontal sinusitis     History of    • Acute gastroenteritis    • Acute sinusitis    • Adrenal gland anomaly    • Allergic rhinitis    • Anxiety    • Bloating    • Body aches    • Bowel incontinence    • Carpal tunnel syndrome    • Cervical spine disease    • Circadian rhythm sleep disorder    • Cough    • Decreased appetite    • Depression    • Diarrhea    • Difficulty walking    • Dysphasia    • Elevated amylase    • Esophageal spasm    • Fatigue    • Former smoker    • JANEY (generalized anxiety disorder)    • GERD without esophagitis    • Headache    • High risk medication use    • History of colon polyps    • Hyperactivity of bladder    • Hypertension    • IBS (irritable bowel syndrome)    • Insomnia    • Irritable bowel syndrome with diarrhea    • Nausea    • Non-ulcer dyspepsia    • Overactive bladder    • Pancreatic disorder    • Persistent insomnia    • Pill dysphagia    • Primary insomnia    • Raynaud's phenomenon    • Rectal itching    • Rectal sphincter incontinence    • Shingles    • Vision loss    • Vitamin B deficiency    • Weight loss        Past Surgical History:   Procedure Laterality Date   • CHOLECYSTECTOMY  2000   • COLONOSCOPY  12/2015    13 polyps removed   • NECK SURGERY     • TOOTH EXTRACTION     • TUBAL ABDOMINAL LIGATION     • UPPER GASTROINTESTINAL ENDOSCOPY  2005    [facility]  - normal per patient       Family History   Problem Relation Age of Onset   • Arthritis Mother    • Aneurysm Mother    • Heart disease Mother    • Hypertension Mother    • Seizures Mother    • Stroke Mother    • Arthritis Father     • Heart disease Father    • Hypertension Father    • No Known Problems Other    • Arthritis Maternal Grandmother    • Cancer Maternal Grandmother    • Heart disease Maternal Grandmother    • Hypertension Maternal Grandmother    • No Known Problems Maternal Grandfather    • No Known Problems Paternal Grandmother    • No Known Problems Paternal Grandfather        Social History     Tobacco Use   • Smoking status: Former Smoker     Packs/day: 1.00     Years: 30.00     Pack years: 30.00     Types: Cigarettes     Quit date: 2014     Years since quittin.2   • Smokeless tobacco: Never Used   Substance Use Topics   • Alcohol use: Yes     Comment: social alcohol use//cafeine 2 cups of coffee 1 glass of tea daily             Objective     Vitals:    20 0801   BP: 110/70   Resp: 12     Body mass index is 20.6 kg/m².    Physical Exam  Constitutional:       Appearance: She is well-developed.   HENT:      Head: Normocephalic and atraumatic.   Pulmonary:      Breath sounds: Normal breath sounds.   Neurological:      Mental Status: She is alert and oriented to person, place, and time.   Psychiatric:         Behavior: Behavior normal.         Thought Content: Thought content normal.         Judgment: Judgment normal.         Lab Results   Component Value Date    BUN 17 2020    CREATININE 0.8 2020    EGFRIFNONA 78 2019    EGFRIFAFRI 95 2019    BCR 21.3 2020    K 3.8 2020    CO2 32 (H) 2020    CALCIUM 8.6 2020    PROTENTOTREF 6.9 2019    ALBUMIN 4.3 2020    LABIL2 1.3 2020    AST 33 2020    ALT 22 2020       WBC   Date Value Ref Range Status   2020 5.27 4.5 - 11.0 10*3/uL Final     RBC   Date Value Ref Range Status   2020 4.82 4.0 - 5.2 10*6/uL Final     Hemoglobin   Date Value Ref Range Status   2020 14.1 12.0 - 16.0 g/dL Final     Hematocrit   Date Value Ref Range Status   2020 43.6 36.0 - 46.0 % Final     MCV    Date Value Ref Range Status   02/08/2020 90.5 80.0 - 100.0 fL Final     MCH   Date Value Ref Range Status   02/08/2020 29.3 26.0 - 34.0 pg Final     MCHC   Date Value Ref Range Status   02/08/2020 32.3 31.0 - 37.0 g/dL Final     RDW   Date Value Ref Range Status   02/08/2020 13.6 12.0 - 16.8 % Final     MPV   Date Value Ref Range Status   02/08/2020 12.7 (H) 6.7 - 10.8 fL Final     Platelets   Date Value Ref Range Status   02/08/2020 226 140 - 440 10*3/uL Final     Neutrophil Rel %   Date Value Ref Range Status   02/08/2020 56.3 45 - 80 % Final     Lymphocyte Rel %   Date Value Ref Range Status   02/08/2020 32.4 15 - 50 % Final     Monocyte Rel %   Date Value Ref Range Status   02/08/2020 7.8 0 - 15 % Final     Eosinophil %   Date Value Ref Range Status   02/08/2020 2.7 0 - 7 % Final     Basophil Rel %   Date Value Ref Range Status   02/08/2020 0.6 0 - 2 % Final     Immature Grans %   Date Value Ref Range Status   02/08/2020 0.2 (H) 0 % Final     Neutrophils Absolute   Date Value Ref Range Status   02/08/2020 2.97 2.0 - 8.8 10*3/uL Final     Lymphocytes Absolute   Date Value Ref Range Status   02/08/2020 1.71 0.7 - 5.5 10*3/uL Final     Monocytes Absolute   Date Value Ref Range Status   02/08/2020 0.41 0.0 - 1.7 10*3/uL Final     Eosinophils Absolute   Date Value Ref Range Status   02/08/2020 0.14 0.0 - 0.8 10*3/uL Final     Basophils Absolute   Date Value Ref Range Status   02/08/2020 0.03 0.0 - 0.2 10*3/uL Final     Immature Grans, Absolute   Date Value Ref Range Status   02/08/2020 0.01 <1 10*3/uL Final     nRBC   Date Value Ref Range Status   02/08/2020 0 0 /100(WBC) Final       Lab Results   Component Value Date    HGBA1C 6.0 (H) 02/08/2020       Lab Results   Component Value Date    TIVBFKMD89 330 09/30/2019       TSH   Date Value Ref Range Status   09/30/2019 0.748 0.270 - 4.200 uIU/mL Final       No results found for: CHOL  Lab Results   Component Value Date    TRIG 78 02/08/2020     Lab Results    Component Value Date    HDL 72 02/08/2020     Lab Results   Component Value Date    LDL 62 02/08/2020     Lab Results   Component Value Date    VLDL 16 02/08/2020     No results found for: LDLHDL      Procedures    Assessment/Plan   Problems Addressed this Visit        Cardiac and Vasculature    Essential hypertension - Primary       Gastrointestinal Abdominal     IBS (irritable bowel syndrome)    Relevant Medications    diphenoxylate-atropine (LOMOTIL) 2.5-0.025 MG per tablet    GERD without esophagitis      Diagnoses       Codes Comments    Essential hypertension    -  Primary ICD-10-CM: I10  ICD-9-CM: 401.9     GERD without esophagitis     ICD-10-CM: K21.9  ICD-9-CM: 530.81     Irritable bowel syndrome with diarrhea     ICD-10-CM: K58.0  ICD-9-CM: 564.1         Continue BP meds.  BP controlled.    Continue PPI.    No orders of the defined types were placed in this encounter.      Current Outpatient Medications   Medication Sig Dispense Refill   • amLODIPine (NORVASC) 5 MG tablet Take 1 tablet by mouth Daily. 90 tablet 3   • clonazePAM (KlonoPIN) 2 MG tablet TK 1/2 T PO QID PRN     • cyanocobalamin 1000 MCG/ML injection Inject 1 mL into the appropriate muscle as directed by prescriber Daily. 1 mL q week x 4 weeks, 1 mL qow x 4 mos 12 mL 0   • dexlansoprazole (DEXILANT) 60 MG capsule Take 1 capsule by mouth Daily. 90 capsule 3   • dextromethorphan polistirex ER (DELSYM) 30 MG/5ML Suspension Extended Release oral suspension Take 60 mg by mouth Every 12 (Twelve) Hours. 280 mL 1   • diphenoxylate-atropine (LOMOTIL) 2.5-0.025 MG per tablet Take 1 tablet by mouth 4 (Four) Times a Day As Needed (cramping/diarrhea). 90 tablet 2   • DULoxetine (CYMBALTA) 30 MG capsule 3 a day. 90 capsule 5   • meloxicam (MOBIC) 15 MG tablet TAKE 1 TABLET BY MOUTH EVERY DAY AS NEEDED FOR SHOULDER PAIN. DO NOT TAKE ANY OTHER NSAIDS 90 tablet 0   • mirtazapine (REMERON) 15 MG tablet TK SS TO ONE T PO QHS.     • ondansetron ODT (ZOFRAN-ODT)  8 MG disintegrating tablet TAKE 1 TABLET BY MOUTH EVERY 8 HOURS AS NEEDED FOR NAUSEA AND VOMITING 30 tablet 2   • oxybutynin XL (DITROPAN-XL) 10 MG 24 hr tablet Take 1 tablet by mouth Daily. 30 tablet 11   • predniSONE (DELTASONE) 20 MG tablet Take 3 tablets a day for 3 days then 2 tablets a day for 3 days then 1 tablet a day for 3 days. 18 tablet 0   • promethazine (PHENERGAN) 25 MG tablet Take 1 tablet by mouth Every 8 (Eight) Hours As Needed for Nausea. 45 tablet 5     No current facility-administered medications for this visit.        Christie Hendricks had no medications administered during this visit.    Return in about 3 months (around 3/31/2021).    There are no Patient Instructions on file for this visit.

## 2021-02-16 ENCOUNTER — TELEPHONE (OUTPATIENT)
Dept: FAMILY MEDICINE CLINIC | Facility: CLINIC | Age: 61
End: 2021-02-16

## 2021-02-16 NOTE — TELEPHONE ENCOUNTER
Left message to return call.   Mammogram ordered back in June, they tried calling her 3 x with no reply.  order expires 6/22/21

## 2021-02-17 RX ORDER — PROMETHAZINE HYDROCHLORIDE 25 MG/1
TABLET ORAL
Qty: 45 TABLET | Refills: 3 | Status: SHIPPED | OUTPATIENT
Start: 2021-02-17 | End: 2021-10-19

## 2021-02-17 NOTE — TELEPHONE ENCOUNTER
Pt called back.  She is in the process of getting all her teeth pulled and getting dentures.  Top plate is done.  She will call us back when she is ready to get her mammo and will sched a pap appt as well.

## 2021-02-19 RX ORDER — MIRTAZAPINE 30 MG/1
TABLET, FILM COATED ORAL
COMMUNITY
Start: 2020-11-13 | End: 2021-06-07

## 2021-05-18 DIAGNOSIS — K58.0 IRRITABLE BOWEL SYNDROME WITH DIARRHEA: ICD-10-CM

## 2021-05-18 RX ORDER — DULOXETIN HYDROCHLORIDE 60 MG/1
120 CAPSULE, DELAYED RELEASE ORAL DAILY
COMMUNITY
Start: 2021-02-28 | End: 2022-04-14 | Stop reason: ALTCHOICE

## 2021-05-18 RX ORDER — DIPHENOXYLATE HYDROCHLORIDE AND ATROPINE SULFATE 2.5; .025 MG/1; MG/1
TABLET ORAL
Qty: 90 TABLET | OUTPATIENT
Start: 2021-05-18

## 2021-05-18 RX ORDER — ALPRAZOLAM 2 MG/1
TABLET ORAL
COMMUNITY
Start: 2021-02-18 | End: 2021-09-02

## 2021-05-20 DIAGNOSIS — K58.0 IRRITABLE BOWEL SYNDROME WITH DIARRHEA: ICD-10-CM

## 2021-05-20 RX ORDER — DIPHENOXYLATE HYDROCHLORIDE AND ATROPINE SULFATE 2.5; .025 MG/1; MG/1
TABLET ORAL
Qty: 90 TABLET | OUTPATIENT
Start: 2021-05-20

## 2021-05-20 RX ORDER — DIPHENOXYLATE HYDROCHLORIDE AND ATROPINE SULFATE 2.5; .025 MG/1; MG/1
1 TABLET ORAL 4 TIMES DAILY PRN
Qty: 90 TABLET | Refills: 2 | Status: SHIPPED | OUTPATIENT
Start: 2021-05-20 | End: 2021-09-22

## 2021-06-07 ENCOUNTER — OFFICE VISIT (OUTPATIENT)
Dept: FAMILY MEDICINE CLINIC | Facility: CLINIC | Age: 61
End: 2021-06-07

## 2021-06-07 VITALS
SYSTOLIC BLOOD PRESSURE: 112 MMHG | HEART RATE: 68 BPM | TEMPERATURE: 97.8 F | HEIGHT: 64 IN | BODY MASS INDEX: 18.44 KG/M2 | DIASTOLIC BLOOD PRESSURE: 64 MMHG | OXYGEN SATURATION: 95 % | WEIGHT: 108 LBS

## 2021-06-07 DIAGNOSIS — I10 ESSENTIAL HYPERTENSION: ICD-10-CM

## 2021-06-07 DIAGNOSIS — G56.01 CARPAL TUNNEL SYNDROME OF RIGHT WRIST: Primary | ICD-10-CM

## 2021-06-07 DIAGNOSIS — T30.0 BURN: ICD-10-CM

## 2021-06-07 PROCEDURE — 99214 OFFICE O/P EST MOD 30 MIN: CPT | Performed by: FAMILY MEDICINE

## 2021-06-07 RX ORDER — AMLODIPINE BESYLATE 5 MG/1
5 TABLET ORAL DAILY
Qty: 90 TABLET | Refills: 3 | Status: SHIPPED | OUTPATIENT
Start: 2021-06-07 | End: 2022-04-14 | Stop reason: ALTCHOICE

## 2021-06-07 RX ORDER — OXYBUTYNIN CHLORIDE 10 MG/1
10 TABLET, EXTENDED RELEASE ORAL DAILY
Qty: 90 TABLET | Refills: 3 | Status: SHIPPED | OUTPATIENT
Start: 2021-06-07 | End: 2023-01-25 | Stop reason: SDUPTHER

## 2021-06-07 RX ORDER — DEXLANSOPRAZOLE 60 MG/1
60 CAPSULE, DELAYED RELEASE ORAL DAILY
Qty: 90 CAPSULE | Refills: 3 | Status: SHIPPED | OUTPATIENT
Start: 2021-06-07 | End: 2022-06-15

## 2021-06-07 NOTE — PROGRESS NOTES
Chief Complaint   Patient presents with   • Med Refill   • Carpal Tunnel     Right Hand    • Burn     On back and shoulders        Subjective   Christie Hendricks is a 60 y.o. female.     History of Present Illness   C/o R hand with pain and feeling of swelling.  Wears brace at night.  Weakness now.     C/o last week with having heating pad on shoulder and has a burn there on upper back.  Using benzocaine.   F/U HTN.  No orthostasis.  Doing well with meds.     The following portions of the patient's history were reviewed and updated as appropriate: allergies, current medications, past family history, past medical history, past social history, past surgical history and problem list.    Review of Systems   Constitutional: Negative for appetite change and fatigue.   HENT: Negative for nosebleeds and sore throat.    Eyes: Negative for blurred vision and visual disturbance.   Respiratory: Negative for shortness of breath and wheezing.    Cardiovascular: Negative for chest pain and leg swelling.   Gastrointestinal: Negative for abdominal distention and abdominal pain.   Endocrine: Negative for cold intolerance and polyuria.   Genitourinary: Negative for dysuria and hematuria.   Musculoskeletal: Negative for arthralgias and myalgias.   Skin: Negative for color change and rash.   Neurological: Negative for weakness and confusion.   Psychiatric/Behavioral: Negative for agitation and depressed mood.       Patient Active Problem List   Diagnosis   • Vitamin B deficiency   • Rectal sphincter incontinence   • Raynaud's phenomenon   • Primary insomnia   • Pill dysphagia   • Persistent insomnia   • Overactive bladder   • Pancreatic disorder   • Non-ulcer dyspepsia   • Irritable bowel syndrome with diarrhea   • Insomnia   • IBS (irritable bowel syndrome)   • Hyperactivity of bladder   • History of colon polyps   • GERD without esophagitis   • JANEY (generalized anxiety disorder)   • Former smoker   • Fatigue   • Esophageal spasm   •  Elevated amylase   • Dysphasia   • Depression   • Circadian rhythm sleep disorder   • Carpal tunnel syndrome   • Anxiety   • Allergic rhinitis   • Adrenal gland anomaly   • Essential hypertension   • Brain lesion   • Right arm numbness   • Deltoid tendonitis, right   • Acute bronchitis due to other specified organisms   • Mild mental slowing   • Rotator cuff tendonitis, right   • TIA (transient ischemic attack)   • Burn       Allergies   Allergen Reactions   • Penicillins Unknown (See Comments)     unknown         Current Outpatient Medications:   •  ALPRAZolam (XANAX) 2 MG tablet, TAKE 1/2 TABLET BY MOUTH FOUR TIMES DAILY AS NEEDED, Disp: , Rfl:   •  amLODIPine (NORVASC) 5 MG tablet, Take 1 tablet by mouth Daily., Disp: 90 tablet, Rfl: 3  •  clonazePAM (KlonoPIN) 2 MG tablet, TK 1/2 T PO QID PRN, Disp: , Rfl:   •  cyanocobalamin 1000 MCG/ML injection, Inject 1 mL into the appropriate muscle as directed by prescriber Daily. 1 mL q week x 4 weeks, 1 mL qow x 4 mos, Disp: 12 mL, Rfl: 0  •  dexlansoprazole (DEXILANT) 60 MG capsule, Take 1 capsule by mouth Daily., Disp: 90 capsule, Rfl: 3  •  dextromethorphan polistirex ER (DELSYM) 30 MG/5ML Suspension Extended Release oral suspension, Take 60 mg by mouth Every 12 (Twelve) Hours., Disp: 280 mL, Rfl: 1  •  diphenoxylate-atropine (LOMOTIL) 2.5-0.025 MG per tablet, Take 1 tablet by mouth 4 (Four) Times a Day As Needed (cramping/diarrhea)., Disp: 90 tablet, Rfl: 2  •  DULoxetine (CYMBALTA) 60 MG capsule, Take 120 mg by mouth Daily., Disp: , Rfl:   •  meloxicam (MOBIC) 15 MG tablet, TAKE 1 TABLET BY MOUTH EVERY DAY AS NEEDED FOR SHOULDER PAIN. DO NOT TAKE ANY OTHER NSAIDS, Disp: 90 tablet, Rfl: 0  •  mirtazapine (REMERON) 15 MG tablet, TK SS TO ONE T PO QHS., Disp: , Rfl:   •  ondansetron ODT (ZOFRAN-ODT) 8 MG disintegrating tablet, TAKE 1 TABLET BY MOUTH EVERY 8 HOURS AS NEEDED FOR NAUSEA AND VOMITING, Disp: 30 tablet, Rfl: 2  •  oxybutynin XL (DITROPAN-XL) 10 MG 24 hr  tablet, Take 1 tablet by mouth Daily., Disp: 90 tablet, Rfl: 3  •  promethazine (PHENERGAN) 25 MG tablet, TAKE 1 TABLET BY MOUTH EVERY 8 HOURS AS NEEDED FOR NAUSEA, Disp: 45 tablet, Rfl: 3    Past Medical History:   Diagnosis Date   • Abdominal cramping    • Abdominal pain, epigastric    • Abdominal pain, lower    • Acute bronchitis    • Acute frontal sinusitis     History of    • Acute gastroenteritis    • Acute sinusitis    • Adrenal gland anomaly    • Allergic rhinitis    • Anxiety    • Bloating    • Body aches    • Bowel incontinence    • Carpal tunnel syndrome    • Cervical spine disease    • Circadian rhythm sleep disorder    • Cough    • Decreased appetite    • Depression    • Diarrhea    • Difficulty walking    • Dysphasia    • Elevated amylase    • Esophageal spasm    • Fatigue    • Former smoker    • JANEY (generalized anxiety disorder)    • GERD without esophagitis    • Headache    • High risk medication use    • History of colon polyps    • Hyperactivity of bladder    • Hypertension    • IBS (irritable bowel syndrome)    • Insomnia    • Irritable bowel syndrome with diarrhea    • Nausea    • Non-ulcer dyspepsia    • Overactive bladder    • Pancreatic disorder    • Persistent insomnia    • Pill dysphagia    • Primary insomnia    • Raynaud's phenomenon    • Rectal itching    • Rectal sphincter incontinence    • Shingles    • Vision loss    • Vitamin B deficiency    • Weight loss        Past Surgical History:   Procedure Laterality Date   • CHOLECYSTECTOMY  2000   • COLONOSCOPY  12/2015    13 polyps removed   • NECK SURGERY     • TOOTH EXTRACTION     • TUBAL ABDOMINAL LIGATION     • UPPER GASTROINTESTINAL ENDOSCOPY  2005    [facility]  - normal per patient       Family History   Problem Relation Age of Onset   • Arthritis Mother    • Aneurysm Mother    • Heart disease Mother    • Hypertension Mother    • Seizures Mother    • Stroke Mother    • Arthritis Father    • Heart disease Father    •  Hypertension Father    • No Known Problems Other    • Arthritis Maternal Grandmother    • Cancer Maternal Grandmother    • Heart disease Maternal Grandmother    • Hypertension Maternal Grandmother    • No Known Problems Maternal Grandfather    • No Known Problems Paternal Grandmother    • No Known Problems Paternal Grandfather        Social History     Tobacco Use   • Smoking status: Former Smoker     Packs/day: 1.00     Years: 30.00     Pack years: 30.00     Types: Cigarettes     Quit date: 2014     Years since quittin.6   • Smokeless tobacco: Never Used   Substance Use Topics   • Alcohol use: Yes     Comment: social alcohol use//cafeine 2 cups of coffee 1 glass of tea daily             Objective     Vitals:    21 1534   BP: 112/64   Pulse: 68   Temp: 97.8 °F (36.6 °C)   SpO2: 95%     Body mass index is 18.54 kg/m².    Physical Exam  Vitals reviewed.   Constitutional:       Appearance: She is well-developed. She is not diaphoretic.   HENT:      Head: Normocephalic and atraumatic.   Eyes:      General: No scleral icterus.     Pupils: Pupils are equal, round, and reactive to light.   Neck:      Thyroid: No thyromegaly.   Cardiovascular:      Rate and Rhythm: Normal rate and regular rhythm.      Heart sounds: No murmur heard.   No friction rub. No gallop.    Pulmonary:      Effort: Pulmonary effort is normal. No respiratory distress.      Breath sounds: No wheezing or rales.   Chest:      Chest wall: No tenderness.   Abdominal:      General: Bowel sounds are normal. There is no distension.      Palpations: Abdomen is soft.      Tenderness: There is no abdominal tenderness.   Musculoskeletal:         General: No deformity. Normal range of motion.   Lymphadenopathy:      Cervical: No cervical adenopathy.   Skin:     General: Skin is warm and dry.      Findings: No rash.      Comments: Sl erythematous linear area upper back approximately 6 cm in length and 2 cm in width.  No blistering noted.       Neurological:      Cranial Nerves: No cranial nerve deficit.      Motor: No abnormal muscle tone.         Lab Results   Component Value Date    BUN 17 02/08/2020    CREATININE 0.8 02/08/2020    EGFRIFNONA 78 09/30/2019    EGFRIFAFRI 95 09/30/2019    BCR 21.3 02/08/2020    K 3.8 02/08/2020    CO2 32 (H) 02/08/2020    CALCIUM 8.6 02/08/2020    PROTENTOTREF 6.9 09/30/2019    ALBUMIN 4.3 02/07/2020    LABIL2 1.3 02/07/2020    AST 33 02/07/2020    ALT 22 02/07/2020       WBC   Date Value Ref Range Status   02/08/2020 5.27 4.5 - 11.0 10*3/uL Final     RBC   Date Value Ref Range Status   02/08/2020 4.82 4.0 - 5.2 10*6/uL Final     Hemoglobin   Date Value Ref Range Status   02/08/2020 14.1 12.0 - 16.0 g/dL Final     Hematocrit   Date Value Ref Range Status   02/08/2020 43.6 36.0 - 46.0 % Final     MCV   Date Value Ref Range Status   02/08/2020 90.5 80.0 - 100.0 fL Final     MCH   Date Value Ref Range Status   02/08/2020 29.3 26.0 - 34.0 pg Final     MCHC   Date Value Ref Range Status   02/08/2020 32.3 31.0 - 37.0 g/dL Final     RDW   Date Value Ref Range Status   02/08/2020 13.6 12.0 - 16.8 % Final     MPV   Date Value Ref Range Status   02/08/2020 12.7 (H) 6.7 - 10.8 fL Final     Platelets   Date Value Ref Range Status   02/08/2020 226 140 - 440 10*3/uL Final     Neutrophil Rel %   Date Value Ref Range Status   02/08/2020 56.3 45 - 80 % Final     Lymphocyte Rel %   Date Value Ref Range Status   02/08/2020 32.4 15 - 50 % Final     Monocyte Rel %   Date Value Ref Range Status   02/08/2020 7.8 0 - 15 % Final     Eosinophil %   Date Value Ref Range Status   02/08/2020 2.7 0 - 7 % Final     Basophil Rel %   Date Value Ref Range Status   02/08/2020 0.6 0 - 2 % Final     Immature Grans %   Date Value Ref Range Status   02/08/2020 0.2 (H) 0 % Final     Neutrophils Absolute   Date Value Ref Range Status   02/08/2020 2.97 2.0 - 8.8 10*3/uL Final     Lymphocytes Absolute   Date Value Ref Range Status   02/08/2020 1.71 0.7 - 5.5  10*3/uL Final     Monocytes Absolute   Date Value Ref Range Status   02/08/2020 0.41 0.0 - 1.7 10*3/uL Final     Eosinophils Absolute   Date Value Ref Range Status   02/08/2020 0.14 0.0 - 0.8 10*3/uL Final     Basophils Absolute   Date Value Ref Range Status   02/08/2020 0.03 0.0 - 0.2 10*3/uL Final     Immature Grans, Absolute   Date Value Ref Range Status   02/08/2020 0.01 <1 10*3/uL Final     nRBC   Date Value Ref Range Status   02/08/2020 0 0 /100(WBC) Final       Lab Results   Component Value Date    HGBA1C 6.0 (H) 02/08/2020       Lab Results   Component Value Date    RUHUZEEU01 330 09/30/2019       TSH   Date Value Ref Range Status   09/30/2019 0.748 0.270 - 4.200 uIU/mL Final       No results found for: CHOL  Lab Results   Component Value Date    TRIG 78 02/08/2020     Lab Results   Component Value Date    HDL 72 02/08/2020     Lab Results   Component Value Date    LDL 62 02/08/2020     Lab Results   Component Value Date    VLDL 16 02/08/2020     No results found for: LDLHDL      Procedures    Assessment/Plan   Problems Addressed this Visit     Burn    Carpal tunnel syndrome - Primary    Relevant Orders    Ambulatory Referral to Hand Surgery    Essential hypertension    Relevant Medications    amLODIPine (NORVASC) 5 MG tablet      Diagnoses       Codes Comments    Carpal tunnel syndrome of right wrist    -  Primary ICD-10-CM: G56.01  ICD-9-CM: 354.0     Burn     ICD-10-CM: T30.0  ICD-9-CM: 949.0     Essential hypertension     ICD-10-CM: I10  ICD-9-CM: 401.9       HTN.  Controlled.  Continue amlodipine 5mg once a day.  RF today.     Carpal tunnel syndrome.  Uncontrolled.  To hand.   Burn.  Use topical otc benzocaine.       Orders Placed This Encounter   Procedures   • Ambulatory Referral to Hand Surgery     Referral Priority:   Routine     Referral Type:   Consultation     Referral Reason:   Specialty Services Required     Requested Specialty:   Hand Surgery     Number of Visits Requested:   1     Pt declines  mammogram right now.    Current Outpatient Medications   Medication Sig Dispense Refill   • ALPRAZolam (XANAX) 2 MG tablet TAKE 1/2 TABLET BY MOUTH FOUR TIMES DAILY AS NEEDED     • amLODIPine (NORVASC) 5 MG tablet Take 1 tablet by mouth Daily. 90 tablet 3   • clonazePAM (KlonoPIN) 2 MG tablet TK 1/2 T PO QID PRN     • cyanocobalamin 1000 MCG/ML injection Inject 1 mL into the appropriate muscle as directed by prescriber Daily. 1 mL q week x 4 weeks, 1 mL qow x 4 mos 12 mL 0   • dexlansoprazole (DEXILANT) 60 MG capsule Take 1 capsule by mouth Daily. 90 capsule 3   • dextromethorphan polistirex ER (DELSYM) 30 MG/5ML Suspension Extended Release oral suspension Take 60 mg by mouth Every 12 (Twelve) Hours. 280 mL 1   • diphenoxylate-atropine (LOMOTIL) 2.5-0.025 MG per tablet Take 1 tablet by mouth 4 (Four) Times a Day As Needed (cramping/diarrhea). 90 tablet 2   • DULoxetine (CYMBALTA) 60 MG capsule Take 120 mg by mouth Daily.     • meloxicam (MOBIC) 15 MG tablet TAKE 1 TABLET BY MOUTH EVERY DAY AS NEEDED FOR SHOULDER PAIN. DO NOT TAKE ANY OTHER NSAIDS 90 tablet 0   • mirtazapine (REMERON) 15 MG tablet TK SS TO ONE T PO QHS.     • ondansetron ODT (ZOFRAN-ODT) 8 MG disintegrating tablet TAKE 1 TABLET BY MOUTH EVERY 8 HOURS AS NEEDED FOR NAUSEA AND VOMITING 30 tablet 2   • oxybutynin XL (DITROPAN-XL) 10 MG 24 hr tablet Take 1 tablet by mouth Daily. 90 tablet 3   • promethazine (PHENERGAN) 25 MG tablet TAKE 1 TABLET BY MOUTH EVERY 8 HOURS AS NEEDED FOR NAUSEA 45 tablet 3     No current facility-administered medications for this visit.       Christie Hendricks had no medications administered during this visit.    Return in about 4 months (around 10/7/2021).    There are no Patient Instructions on file for this visit.

## 2021-06-28 ENCOUNTER — TELEPHONE (OUTPATIENT)
Dept: FAMILY MEDICINE CLINIC | Facility: CLINIC | Age: 61
End: 2021-06-28

## 2021-06-28 NOTE — TELEPHONE ENCOUNTER
Caller: Christie Hendricks    Relationship: Self    Best call back number: [phone]    What specialty or service is being requested: CARPAL TUNNEL       Any additional details: PATIENT WAS SEEN IN OFFICE 6-7-21. PROVIDER STATED THAT HE WOULD SCHEDULE PATIENT WITH A PROVIDER TO CHECK CARPAL TUNNEL.  PATIENT HAS NOT HEARD ANYTHING BACK CONCERNING THIS.    PLEASE CALL PATIENT

## 2021-06-29 NOTE — TELEPHONE ENCOUNTER
The following pt call back today regarding the status of her referral to a hand specialist. Pt states that she wants to see  and does not want to be referred to [facility].

## 2021-07-30 ENCOUNTER — TRANSCRIBE ORDERS (OUTPATIENT)
Dept: LAB | Facility: SURGERY CENTER | Age: 61
End: 2021-07-30

## 2021-07-30 DIAGNOSIS — Z01.818 OTHER SPECIFIED PRE-OPERATIVE EXAMINATION: Primary | ICD-10-CM

## 2021-08-03 DIAGNOSIS — R11.0 NAUSEA: ICD-10-CM

## 2021-08-03 RX ORDER — ONDANSETRON 8 MG/1
TABLET, ORALLY DISINTEGRATING ORAL
Qty: 30 TABLET | Refills: 1 | Status: SHIPPED | OUTPATIENT
Start: 2021-08-03 | End: 2021-10-19 | Stop reason: SDUPTHER

## 2021-09-02 NOTE — SIGNIFICANT NOTE
Education provided the Patient on the following:    - Nothing to Eat or Drink after MN the night before the procedure  -Your required COVID Test is Scheduled on   9/6/21   Between the Hours of 8801-5914  -You will only be notified if your COVID test Result is POSITIVE  -The importance of reducing your number of contacts by self quarantining after you COVID test until the date of your Surgery  -You will need to have someone drive you home after your Surgery and remain with you for 24 hours after the Procedure  - The date of your procedure, your are welcome to have one visitor at bedside or remain within 10-15 minutes of [facility]  -Please wear warm socks when you arrive for your Surgery  -Remove all jewelry and leave any valuables before arriving on the date of your procedure (all will have to be removed before leaving preop)  -You will need to arrive at    1100   on      9/8/21  for your Surgery  -Feel free to contact us at: [phone] with any additional questions/concerns

## 2021-09-03 ENCOUNTER — HOSPITAL ENCOUNTER (OUTPATIENT)
Dept: CARDIOLOGY | Facility: HOSPITAL | Age: 61
Discharge: HOME | End: 2021-09-03
Admitting: PLASTIC SURGERY

## 2021-09-03 ENCOUNTER — TRANSCRIBE ORDERS (OUTPATIENT)
Dept: ADMINISTRATIVE | Facility: HOSPITAL | Age: 61
End: 2021-09-03

## 2021-09-03 DIAGNOSIS — Z01.818 PRE-OP TESTING: Primary | ICD-10-CM

## 2021-09-03 DIAGNOSIS — Z01.818 PRE-OP TESTING: ICD-10-CM

## 2021-09-03 LAB — QT INTERVAL: 417 MS

## 2021-09-03 PROCEDURE — 93005 ELECTROCARDIOGRAM TRACING: CPT | Performed by: PLASTIC SURGERY

## 2021-09-03 PROCEDURE — 93010 ELECTROCARDIOGRAM REPORT: CPT | Performed by: INTERNAL MEDICINE

## 2021-09-06 ENCOUNTER — LAB (OUTPATIENT)
Dept: LAB | Facility: SURGERY CENTER | Age: 61
End: 2021-09-06

## 2021-09-06 DIAGNOSIS — Z01.818 OTHER SPECIFIED PRE-OPERATIVE EXAMINATION: ICD-10-CM

## 2021-09-06 LAB — SARS-COV-2 ORF1AB RESP QL NAA+PROBE: NOT DETECTED

## 2021-09-06 PROCEDURE — U0004 COV-19 TEST NON-CDC HGH THRU: HCPCS

## 2021-09-06 PROCEDURE — C9803 HOPD COVID-19 SPEC COLLECT: HCPCS

## 2021-09-08 ENCOUNTER — HOSPITAL ENCOUNTER (OUTPATIENT)
Facility: SURGERY CENTER | Age: 61
Setting detail: HOSPITAL OUTPATIENT SURGERY
Discharge: HOME | End: 2021-09-08
Attending: PLASTIC SURGERY | Admitting: PLASTIC SURGERY

## 2021-09-08 ENCOUNTER — ANESTHESIA (OUTPATIENT)
Dept: SURGERY | Facility: SURGERY CENTER | Age: 61
End: 2021-09-08

## 2021-09-08 ENCOUNTER — ANESTHESIA EVENT (OUTPATIENT)
Dept: SURGERY | Facility: SURGERY CENTER | Age: 61
End: 2021-09-08

## 2021-09-08 VITALS
BODY MASS INDEX: 18.07 KG/M2 | OXYGEN SATURATION: 94 % | TEMPERATURE: 98 F | HEART RATE: 66 BPM | SYSTOLIC BLOOD PRESSURE: 136 MMHG | HEIGHT: 63 IN | RESPIRATION RATE: 16 BRPM | DIASTOLIC BLOOD PRESSURE: 79 MMHG | WEIGHT: 102 LBS

## 2021-09-08 DIAGNOSIS — M67.431 GANGLION OF RIGHT WRIST: ICD-10-CM

## 2021-09-08 DIAGNOSIS — G56.01 CARPAL TUNNEL SYNDROME ON RIGHT: ICD-10-CM

## 2021-09-08 PROCEDURE — 0LB50ZZ EXCISION OF RIGHT LOWER ARM AND WRIST TENDON, OPEN APPROACH: ICD-10-PCS | Performed by: PLASTIC SURGERY

## 2021-09-08 PROCEDURE — 25010000002 ROPIVACAINE PER 1 MG: Performed by: ANESTHESIOLOGY

## 2021-09-08 PROCEDURE — 25010000002 MIDAZOLAM PER 1MG: Performed by: ANESTHESIOLOGY

## 2021-09-08 PROCEDURE — 64721 CARPAL TUNNEL SURGERY: CPT | Performed by: PLASTIC SURGERY

## 2021-09-08 PROCEDURE — 25010000002 FENTANYL CITRATE (PF) 50 MCG/ML SOLUTION: Performed by: ANESTHESIOLOGY

## 2021-09-08 PROCEDURE — 01N50ZZ RELEASE MEDIAN NERVE, OPEN APPROACH: ICD-10-PCS | Performed by: PLASTIC SURGERY

## 2021-09-08 PROCEDURE — 25111 REMOVE WRIST TENDON LESION: CPT | Performed by: PLASTIC SURGERY

## 2021-09-08 PROCEDURE — 25010000002 PROPOFOL 10 MG/ML EMULSION: Performed by: ANESTHESIOLOGY

## 2021-09-08 PROCEDURE — 88304 TISSUE EXAM BY PATHOLOGIST: CPT | Performed by: PLASTIC SURGERY

## 2021-09-08 RX ORDER — FENTANYL CITRATE 50 UG/ML
50 INJECTION, SOLUTION INTRAMUSCULAR; INTRAVENOUS
Status: DISCONTINUED | OUTPATIENT
Start: 2021-09-08 | End: 2021-09-08 | Stop reason: HOSPADM

## 2021-09-08 RX ORDER — IBUPROFEN 200 MG
600 TABLET ORAL ONCE AS NEEDED
Status: DISCONTINUED | OUTPATIENT
Start: 2021-09-08 | End: 2021-09-08 | Stop reason: HOSPADM

## 2021-09-08 RX ORDER — LIDOCAINE HYDROCHLORIDE 10 MG/ML
0.5 INJECTION, SOLUTION INFILTRATION; PERINEURAL ONCE AS NEEDED
Status: DISCONTINUED | OUTPATIENT
Start: 2021-09-08 | End: 2021-09-08 | Stop reason: HOSPADM

## 2021-09-08 RX ORDER — EPHEDRINE SULFATE 50 MG/ML
INJECTION INTRAVENOUS AS NEEDED
Status: DISCONTINUED | OUTPATIENT
Start: 2021-09-08 | End: 2021-09-08 | Stop reason: SURG

## 2021-09-08 RX ORDER — SODIUM CHLORIDE 0.9 % (FLUSH) 0.9 %
10 SYRINGE (ML) INJECTION AS NEEDED
Status: DISCONTINUED | OUTPATIENT
Start: 2021-09-08 | End: 2021-09-08 | Stop reason: HOSPADM

## 2021-09-08 RX ORDER — SODIUM CHLORIDE, SODIUM LACTATE, POTASSIUM CHLORIDE, CALCIUM CHLORIDE 600; 310; 30; 20 MG/100ML; MG/100ML; MG/100ML; MG/100ML
1000 INJECTION, SOLUTION INTRAVENOUS CONTINUOUS
Status: DISCONTINUED | OUTPATIENT
Start: 2021-09-08 | End: 2021-09-08 | Stop reason: HOSPADM

## 2021-09-08 RX ORDER — ROPIVACAINE HYDROCHLORIDE 5 MG/ML
INJECTION, SOLUTION EPIDURAL; INFILTRATION; PERINEURAL
Status: COMPLETED | OUTPATIENT
Start: 2021-09-08 | End: 2021-09-08

## 2021-09-08 RX ORDER — MAGNESIUM HYDROXIDE 1200 MG/15ML
LIQUID ORAL AS NEEDED
Status: DISCONTINUED | OUTPATIENT
Start: 2021-09-08 | End: 2021-09-08 | Stop reason: HOSPADM

## 2021-09-08 RX ORDER — DIPHENHYDRAMINE HCL 25 MG
25 TABLET ORAL
Status: DISCONTINUED | OUTPATIENT
Start: 2021-09-08 | End: 2021-09-08 | Stop reason: HOSPADM

## 2021-09-08 RX ORDER — FLUMAZENIL 0.1 MG/ML
0.2 INJECTION INTRAVENOUS AS NEEDED
Status: DISCONTINUED | OUTPATIENT
Start: 2021-09-08 | End: 2021-09-08 | Stop reason: HOSPADM

## 2021-09-08 RX ORDER — MIDAZOLAM HYDROCHLORIDE 1 MG/ML
1 INJECTION INTRAMUSCULAR; INTRAVENOUS
Status: COMPLETED | OUTPATIENT
Start: 2021-09-08 | End: 2021-09-08

## 2021-09-08 RX ORDER — NALOXONE HCL 0.4 MG/ML
0.2 VIAL (ML) INJECTION AS NEEDED
Status: DISCONTINUED | OUTPATIENT
Start: 2021-09-08 | End: 2021-09-08 | Stop reason: HOSPADM

## 2021-09-08 RX ORDER — LIDOCAINE HYDROCHLORIDE 20 MG/ML
INJECTION, SOLUTION INFILTRATION; PERINEURAL AS NEEDED
Status: DISCONTINUED | OUTPATIENT
Start: 2021-09-08 | End: 2021-09-08 | Stop reason: SURG

## 2021-09-08 RX ORDER — SODIUM CHLORIDE, SODIUM LACTATE, POTASSIUM CHLORIDE, CALCIUM CHLORIDE 600; 310; 30; 20 MG/100ML; MG/100ML; MG/100ML; MG/100ML
9 INJECTION, SOLUTION INTRAVENOUS CONTINUOUS
Status: DISCONTINUED | OUTPATIENT
Start: 2021-09-08 | End: 2021-09-08 | Stop reason: HOSPADM

## 2021-09-08 RX ORDER — DIPHENHYDRAMINE HYDROCHLORIDE 50 MG/ML
12.5 INJECTION INTRAMUSCULAR; INTRAVENOUS
Status: DISCONTINUED | OUTPATIENT
Start: 2021-09-08 | End: 2021-09-08 | Stop reason: HOSPADM

## 2021-09-08 RX ADMIN — PROPOFOL INJECTABLE EMULSION 100 MCG/KG/MIN: 10 INJECTION, EMULSION INTRAVENOUS at 14:16

## 2021-09-08 RX ADMIN — MIDAZOLAM HYDROCHLORIDE 1 MG: 2 INJECTION, SOLUTION INTRAMUSCULAR; INTRAVENOUS at 12:38

## 2021-09-08 RX ADMIN — FAMOTIDINE 20 MG: 10 INJECTION INTRAVENOUS at 12:36

## 2021-09-08 RX ADMIN — SODIUM CHLORIDE, POTASSIUM CHLORIDE, SODIUM LACTATE AND CALCIUM CHLORIDE 1000 ML: 600; 310; 30; 20 INJECTION, SOLUTION INTRAVENOUS at 11:52

## 2021-09-08 RX ADMIN — LIDOCAINE HYDROCHLORIDE 80 MG: 20 INJECTION, SOLUTION INFILTRATION; PERINEURAL at 14:16

## 2021-09-08 RX ADMIN — MIDAZOLAM HYDROCHLORIDE 1 MG: 2 INJECTION, SOLUTION INTRAMUSCULAR; INTRAVENOUS at 12:43

## 2021-09-08 RX ADMIN — ROPIVACAINE HYDROCHLORIDE 30 ML: 5 INJECTION, SOLUTION EPIDURAL; INFILTRATION; PERINEURAL at 12:51

## 2021-09-08 RX ADMIN — GLYCOPYRROLATE 0.2 MG: 0.2 INJECTION, SOLUTION INTRAMUSCULAR; INTRAVITREAL at 14:19

## 2021-09-08 RX ADMIN — EPHEDRINE SULFATE 10 MG: 50 INJECTION, SOLUTION INTRAVENOUS at 14:22

## 2021-09-08 RX ADMIN — FENTANYL CITRATE 50 MCG: 50 INJECTION, SOLUTION INTRAMUSCULAR; INTRAVENOUS at 12:39

## 2021-09-08 NOTE — ANESTHESIA PREPROCEDURE EVALUATION
Anesthesia Evaluation     Nursing notes reviewed   no history of anesthetic complications:               Airway   Mallampati: III  TM distance: >3 FB  Neck ROM: full  Dental    (+) lower dentures and upper dentures    Pulmonary - normal exam   (+) a smoker Former,   Cardiovascular - normal exam    (+) hypertension,   (-) angina      Neuro/Psych  (+) psychiatric history Anxiety,       ROS Comment: Raynaud's phenomenon  GI/Hepatic/Renal/Endo    (+)  GERD,      Musculoskeletal         ROS comment: Carpal tunnel syndrome  Ganglion of right wrist   Abdominal    Substance History      OB/GYN          Other                        Anesthesia Plan    ASA 2     MAC   (R SCBlock for post op pain control PSR )    Anesthetic plan, all risks, benefits, and alternatives have been provided, discussed and informed consent has been obtained with: patient.

## 2021-09-08 NOTE — BRIEF OP NOTE
CARPAL TUNNEL RELEASE, WRIST GANGLION EXCISION  Progress Note    Christie Hendricks  9/8/2021    Pre-op Diagnosis:   Carpal tunnel syndrome on right [G56.01]  Ganglion of right wrist [M67.431]       Post-Op Diagnosis Codes:     * Carpal tunnel syndrome on right [G56.01]     * Ganglion of right wrist [M67.431]    Procedure/CPT® Codes:        Procedure(s):  RIGHT CARPAL TUNNEL RELEASE, WITH SYNOVECTOMY, RIGHT VOLAR WRIST EXCISCION OF VOLAR GANGLION CYST  RIGHT WRIST VOLAR GANGLION EXCISION    Surgeon(s):  Miranda Luke MD    Anesthesia: Monitored Anesthesia Care with [facility]    Staff:   Circulator: Ximena Rodriguez RN  Scrub Person: Yaquelin Mart RN         Estimated Blood Loss:2 cc    Urine Voided: * No values recorded between 9/8/2021  2:14 PM and 9/8/2021  3:16 PM *    Specimens:                Specimens     ID Source Type Tests Collected By Collected At Frozen?    A Wrist, Right Tissue · TISSUE PATHOLOGY EXAM   Miranda Luke MD 9/8/21 1442 No    Description: right wrist volar ganglion cyst    Comment: right wrist volar ganglion cyst    This specimen was not marked as sent.    B Wrist, Right Synovium · TISSUE PATHOLOGY EXAM   Miranda Luke MD 9/8/21 1112 No    Description: right wrist synovium    Comment: right wrist synovium    This specimen was not marked as sent.                Drains: * No LDAs found *    Findings: multiloculated cyst    Complications: none          Miranda Luke MD     Date: 9/8/2021  Time: 15:27 EDT

## 2021-09-08 NOTE — ANESTHESIA PROCEDURE NOTES
Peripheral Block      Patient reassessed immediately prior to procedure    Patient location during procedure: holding area  Stop time: 9/8/2021 12:50 PM  Reason for block: at surgeon's request and post-op pain management  Performed by  Anesthesiologist: Jan Driscoll MD  Preanesthetic Checklist  Completed: patient identified, IV checked, site marked, risks and benefits discussed, surgical consent, monitors and equipment checked, pre-op evaluation and timeout performed  Prep:  Pt Position: supine  Sterile barriers:cap, gloves and mask  Prep: ChloraPrep  Patient monitoring: blood pressure monitoring, continuous pulse oximetry and EKG  Procedure  Sedation:yes    Guidance:ultrasound guided  ULTRASOUND INTERPRETATION.  Using ultrasound guidance a 21 G gauge needle was placed in close proximity to the brachial plexus nerve, at which point, under ultrasound guidance anesthetic was injected in the area of the nerve and spread of the anesthesia was seen on ultrasound in close proximity thereto.  There were no abnormalities seen on ultrasound; a digital image was taken; and the patient tolerated the procedure with no complications. Images:still images obtained, printed/placed on chart    Laterality:right  Block Type:supraclavicular  Needle Gauge:21 G      Medications Used: ropivacaine (NAROPIN) 0.5 % injection, 30 mL  Med admintered at 9/8/2021 12:51 PM      Post Assessment  Injection Assessment: negative aspiration for heme, no paresthesia on injection and incremental injection  Patient Tolerance:comfortable throughout block  Complications:no

## 2021-09-09 NOTE — ANESTHESIA POSTPROCEDURE EVALUATION
Patient: Christie Hendricks    Procedure Summary     Date: 09/08/21 Room / Location: [facility] OR 01 / [facility]    Anesthesia Start: 1416 Anesthesia Stop: 1521    Procedures:       RIGHT CARPAL TUNNEL RELEASE, WITH SYNOVECTOMY, RIGHT VOLAR WRIST EXCISCION OF VOLAR GANGLION CYST (Right Wrist)      RIGHT WRIST VOLAR GANGLION EXCISION (Right Wrist) Diagnosis:       Carpal tunnel syndrome on right      Ganglion of right wrist      (Carpal tunnel syndrome on right [G56.01])      (Ganglion of right wrist [M67.431])    Surgeons: Miranda Luke MD Provider: Jan Driscoll MD    Anesthesia Type: MAC ASA Status: 2          Anesthesia Type: MAC    Vitals  Vitals Value Taken Time   /79 09/08/21 1533   Temp     Pulse 66 09/08/21 1533   Resp 16 09/08/21 1533   SpO2 94 % 09/08/21 1533           Post Anesthesia Care and Evaluation    Patient location during evaluation: PHASE II  Anesthetic complications: No anesthetic complications

## 2021-09-10 LAB
LAB AP CASE REPORT: NORMAL
LAB AP CLINICAL INFORMATION: NORMAL
PATH REPORT.FINAL DX SPEC: NORMAL
PATH REPORT.GROSS SPEC: NORMAL

## 2021-09-22 DIAGNOSIS — K58.0 IRRITABLE BOWEL SYNDROME WITH DIARRHEA: ICD-10-CM

## 2021-09-22 RX ORDER — DIPHENOXYLATE HYDROCHLORIDE AND ATROPINE SULFATE 2.5; .025 MG/1; MG/1
TABLET ORAL
Qty: 90 TABLET | Refills: 1 | Status: SHIPPED | OUTPATIENT
Start: 2021-09-22 | End: 2021-10-19 | Stop reason: SDUPTHER

## 2021-10-19 ENCOUNTER — OFFICE VISIT (OUTPATIENT)
Dept: FAMILY MEDICINE CLINIC | Facility: CLINIC | Age: 61
End: 2021-10-19

## 2021-10-19 VITALS
DIASTOLIC BLOOD PRESSURE: 82 MMHG | TEMPERATURE: 97.7 F | OXYGEN SATURATION: 99 % | SYSTOLIC BLOOD PRESSURE: 130 MMHG | HEIGHT: 63 IN | BODY MASS INDEX: 18.07 KG/M2 | WEIGHT: 102 LBS | HEART RATE: 86 BPM

## 2021-10-19 DIAGNOSIS — K58.0 IRRITABLE BOWEL SYNDROME WITH DIARRHEA: ICD-10-CM

## 2021-10-19 DIAGNOSIS — R11.0 NAUSEA: ICD-10-CM

## 2021-10-19 DIAGNOSIS — K63.5 POLYP OF COLON, UNSPECIFIED PART OF COLON, UNSPECIFIED TYPE: ICD-10-CM

## 2021-10-19 DIAGNOSIS — R53.82 CHRONIC FATIGUE: ICD-10-CM

## 2021-10-19 DIAGNOSIS — F33.41 RECURRENT MAJOR DEPRESSIVE DISORDER, IN PARTIAL REMISSION (HCC): ICD-10-CM

## 2021-10-19 DIAGNOSIS — I10 ESSENTIAL HYPERTENSION: ICD-10-CM

## 2021-10-19 DIAGNOSIS — R63.4 UNINTENTIONAL WEIGHT LOSS: ICD-10-CM

## 2021-10-19 DIAGNOSIS — Z12.31 BREAST CANCER SCREENING BY MAMMOGRAM: ICD-10-CM

## 2021-10-19 DIAGNOSIS — Z00.00 ENCOUNTER FOR ANNUAL HEALTH EXAMINATION: Primary | ICD-10-CM

## 2021-10-19 PROCEDURE — 90686 IIV4 VACC NO PRSV 0.5 ML IM: CPT | Performed by: FAMILY MEDICINE

## 2021-10-19 PROCEDURE — 99396 PREV VISIT EST AGE 40-64: CPT | Performed by: FAMILY MEDICINE

## 2021-10-19 PROCEDURE — 90471 IMMUNIZATION ADMIN: CPT | Performed by: FAMILY MEDICINE

## 2021-10-19 PROCEDURE — 99214 OFFICE O/P EST MOD 30 MIN: CPT | Performed by: FAMILY MEDICINE

## 2021-10-19 RX ORDER — TEMAZEPAM 30 MG/1
30 CAPSULE ORAL
COMMUNITY
Start: 2021-09-17

## 2021-10-19 RX ORDER — ONDANSETRON 8 MG/1
TABLET, ORALLY DISINTEGRATING ORAL
Qty: 30 TABLET | Refills: 3 | Status: SHIPPED | OUTPATIENT
Start: 2021-10-19 | End: 2022-01-12 | Stop reason: SDUPTHER

## 2021-10-19 RX ORDER — DIPHENOXYLATE HYDROCHLORIDE AND ATROPINE SULFATE 2.5; .025 MG/1; MG/1
1 TABLET ORAL 3 TIMES DAILY PRN
Qty: 90 TABLET | Refills: 2 | Status: SHIPPED | OUTPATIENT
Start: 2021-10-19 | End: 2022-02-25

## 2021-10-19 NOTE — PROGRESS NOTES
Patient here for annual physical exam    Subjective   Christie Hendricks is a 60 y.o. female.     History of Present Illness   60 year old WF here for annual.    The following portions of the patient's history were reviewed and updated as appropriate: allergies, current medications, past family history, past medical history, past social history, past surgical history and problem list    Last colonoscopy: 12/15  Optometry:UTD  Dentist:  UTD  Last PSA(if applicable):NA  Last mammo(if applicable):6/20.      F/U HTN.  No orhtostasis.  Doing well with meds.    F/U depression.  Doing well with meds.  No SI or HI.    F/U colon polyps.  Last C scope 2015 with multiple polyps.  Needs refills.   F/U IBS. Needds lomotil refilled.  Doing well with meds.        Immunization History   Administered Date(s) Administered   • Flu Vaccine Intradermal Quad 18-64YR 08/30/2018, 10/04/2019   • FluLaval/Fluarix/Fluzone >6 09/30/2019   • Hepatitis A 09/09/2018, 02/16/2019   • Influenza, Unspecified 08/27/2020   • Tdap 04/28/2016   • Zostavax 01/18/2021   • flucelvax quad pfs =>4 YRS 08/27/2020       Review of Systems   Constitutional: Positive for fatigue. Negative for appetite change.   HENT: Negative for nosebleeds and sore throat.    Eyes: Negative for blurred vision and visual disturbance.   Respiratory: Negative for shortness of breath and wheezing.    Cardiovascular: Negative for chest pain and leg swelling.   Gastrointestinal: Negative for abdominal distention and abdominal pain.   Endocrine: Negative for cold intolerance and polyuria.   Genitourinary: Negative for dysuria and hematuria.   Musculoskeletal: Negative for arthralgias and myalgias.   Skin: Negative for color change and rash.   Neurological: Negative for weakness and confusion.   Psychiatric/Behavioral: Negative for agitation and depressed mood.       Patient Active Problem List   Diagnosis   • Vitamin B deficiency   • Rectal sphincter incontinence   • Raynaud's  phenomenon   • Primary insomnia   • Pill dysphagia   • Persistent insomnia   • Overactive bladder   • Pancreatic disorder   • Non-ulcer dyspepsia   • Irritable bowel syndrome with diarrhea   • Insomnia   • IBS (irritable bowel syndrome)   • Hyperactivity of bladder   • History of colon polyps   • GERD without esophagitis   • JANEY (generalized anxiety disorder)   • Former smoker   • Fatigue   • Esophageal spasm   • Elevated amylase   • Dysphasia   • Depression   • Circadian rhythm sleep disorder   • Carpal tunnel syndrome   • Anxiety   • Allergic rhinitis   • Adrenal gland anomaly   • Essential hypertension   • Brain lesion   • Right arm numbness   • Deltoid tendonitis, right   • Acute bronchitis due to other specified organisms   • Mild mental slowing   • Rotator cuff tendonitis, right   • TIA (transient ischemic attack)   • Burn   • Encounter for annual health examination   • Colon polyps   • Unintentional weight loss       Allergies   Allergen Reactions   • Penicillins Hives and Shortness Of Breath     unknown         Current Outpatient Medications:   •  amLODIPine (NORVASC) 5 MG tablet, Take 1 tablet by mouth Daily., Disp: 90 tablet, Rfl: 3  •  clonazePAM (KlonoPIN) 2 MG tablet, TK 1/2 T PO QID PRN, Disp: , Rfl:   •  dexlansoprazole (DEXILANT) 60 MG capsule, Take 1 capsule by mouth Daily., Disp: 90 capsule, Rfl: 3  •  diphenoxylate-atropine (LOMOTIL) 2.5-0.025 MG per tablet, TAKE 1 TABLET BY MOUTH FOUR TIMES DAILY AS NEEDED FOR CRAMPING OR DIARRHEA, Disp: 90 tablet, Rfl: 1  •  DULoxetine (CYMBALTA) 60 MG capsule, Take 120 mg by mouth Daily., Disp: , Rfl:   •  mirtazapine (REMERON) 15 MG tablet, TK SS TO ONE T PO QHS., Disp: , Rfl:   •  ondansetron ODT (ZOFRAN-ODT) 8 MG disintegrating tablet, DISSOLVE 1 TABLET ON THE TONGUE EVERY 8 HOURS AS NEEDED FOR NAUSEA OR VOMITING, Disp: 30 tablet, Rfl: 1  •  oxybutynin XL (DITROPAN-XL) 10 MG 24 hr tablet, Take 1 tablet by mouth Daily., Disp: 90 tablet, Rfl: 3  •   promethazine (PHENERGAN) 25 MG tablet, TAKE 1 TABLET BY MOUTH EVERY 8 HOURS AS NEEDED FOR NAUSEA, Disp: 45 tablet, Rfl: 3  •  temazepam (RESTORIL) 30 MG capsule, Take 30 mg by mouth every night at bedtime., Disp: , Rfl:     Past Medical History:   Diagnosis Date   • Abdominal cramping    • Abdominal pain, epigastric    • Abdominal pain, lower    • Acute bronchitis    • Acute frontal sinusitis     History of    • Acute gastroenteritis    • Acute sinusitis    • Adrenal gland anomaly    • Allergic rhinitis    • Anxiety    • Arthritis    • Bloating    • Body aches    • Bowel incontinence    • Carpal tunnel syndrome    • Cervical spine disease    • Circadian rhythm sleep disorder    • Cough    • Decreased appetite    • Depression    • Diarrhea    • Difficulty walking    • Dysphasia    • Elevated amylase    • Esophageal spasm    • Fatigue    • Former smoker    • JANEY (generalized anxiety disorder)    • GERD without esophagitis    • Headache    • High risk medication use    • History of colon polyps    • Hyperactivity of bladder    • Hypertension    • IBS (irritable bowel syndrome)    • Insomnia    • Irritable bowel syndrome with diarrhea    • Nausea    • Non-ulcer dyspepsia    • Overactive bladder    • Pancreatic disorder    • Persistent insomnia    • Pill dysphagia    • Primary insomnia    • Raynaud's phenomenon    • Rectal itching    • Rectal sphincter incontinence    • Shingles    • Vision loss    • Vitamin B deficiency    • Weight loss        Past Surgical History:   Procedure Laterality Date   • CARPAL TUNNEL RELEASE Right 9/8/2021    Procedure: RIGHT CARPAL TUNNEL RELEASE, WITH SYNOVECTOMY, RIGHT VOLAR WRIST EXCISCION OF VOLAR GANGLION CYST;  Surgeon: Miranda Luke MD;  [place] [facility] [place];  Service: Plastics;  Laterality: Right;   • CERVICAL DISCECTOMY ANTERIOR     • CHOLECYSTECTOMY  2000   • COLONOSCOPY  12/2015    13 polyps removed   • NECK SURGERY     • TOOTH EXTRACTION     • TUBAL ABDOMINAL LIGATION      • UPPER GASTROINTESTINAL ENDOSCOPY      [facility]  - normal per patient   • WRIST GANGLION EXCISION Right 2021    Procedure: RIGHT WRIST VOLAR GANGLION EXCISION;  Surgeon: Miranda Luke MD;  [place] [facility] [place];  Service: Plastics;  Laterality: Right;       Family History   Problem Relation Age of Onset   • Arthritis Mother    • Aneurysm Mother    • Heart disease Mother    • Hypertension Mother    • Seizures Mother    • Stroke Mother    • Arthritis Father    • Heart disease Father    • Hypertension Father    • No Known Problems Other    • Arthritis Maternal Grandmother    • Cancer Maternal Grandmother    • Heart disease Maternal Grandmother    • Hypertension Maternal Grandmother    • No Known Problems Maternal Grandfather    • No Known Problems Paternal Grandmother    • No Known Problems Paternal Grandfather        Social History     Tobacco Use   • Smoking status: Former Smoker     Packs/day: 1.00     Years: 30.00     Pack years: 30.00     Types: Cigarettes     Quit date: 2014     Years since quittin.0   • Smokeless tobacco: Never Used   Substance Use Topics   • Alcohol use: Yes     Comment: social alcohol use//cafeine 2 cups of coffee 1 glass of tea daily             Objective     Vitals:    10/19/21 0919   BP: 130/82   Pulse: 86   Temp: 97.7 °F (36.5 °C)   SpO2: 99%     Body mass index is 18.07 kg/m².    Physical Exam  Vitals reviewed.   Constitutional:       Appearance: She is well-developed. She is not diaphoretic.   HENT:      Head: Normocephalic and atraumatic.   Eyes:      General: No scleral icterus.     Pupils: Pupils are equal, round, and reactive to light.   Neck:      Thyroid: No thyromegaly.   Cardiovascular:      Rate and Rhythm: Normal rate and regular rhythm.      Heart sounds: No murmur heard.  No friction rub. No gallop.    Pulmonary:      Effort: Pulmonary effort is normal. No respiratory distress.      Breath sounds: No wheezing or rales.   Chest:       Chest wall: No tenderness.   Abdominal:      General: Bowel sounds are normal. There is no distension.      Palpations: Abdomen is soft.      Tenderness: There is no abdominal tenderness.   Musculoskeletal:         General: No deformity. Normal range of motion.   Lymphadenopathy:      Cervical: No cervical adenopathy.   Skin:     General: Skin is warm and dry.      Findings: No rash.   Neurological:      Cranial Nerves: No cranial nerve deficit.      Motor: No abnormal muscle tone.         Lab Results   Component Value Date    BUN 17 02/08/2020    CREATININE 0.8 02/08/2020    EGFRIFNONA 78 09/30/2019    EGFRIFAFRI 95 09/30/2019    BCR 21.3 02/08/2020    K 3.8 02/08/2020    CO2 32 (H) 02/08/2020    CALCIUM 8.6 02/08/2020    PROTENTOTREF 6.9 09/30/2019    ALBUMIN 4.3 02/07/2020    LABIL2 1.3 02/07/2020    AST 33 02/07/2020    ALT 22 02/07/2020       WBC   Date Value Ref Range Status   02/08/2020 5.27 4.5 - 11.0 10*3/uL Final     RBC   Date Value Ref Range Status   02/08/2020 4.82 4.0 - 5.2 10*6/uL Final     Hemoglobin   Date Value Ref Range Status   02/08/2020 14.1 12.0 - 16.0 g/dL Final     Hematocrit   Date Value Ref Range Status   02/08/2020 43.6 36.0 - 46.0 % Final     MCV   Date Value Ref Range Status   02/08/2020 90.5 80.0 - 100.0 fL Final     MCH   Date Value Ref Range Status   02/08/2020 29.3 26.0 - 34.0 pg Final     MCHC   Date Value Ref Range Status   02/08/2020 32.3 31.0 - 37.0 g/dL Final     RDW   Date Value Ref Range Status   02/08/2020 13.6 12.0 - 16.8 % Final     MPV   Date Value Ref Range Status   02/08/2020 12.7 (H) 6.7 - 10.8 fL Final     Platelets   Date Value Ref Range Status   02/08/2020 226 140 - 440 10*3/uL Final     Neutrophil Rel %   Date Value Ref Range Status   02/08/2020 56.3 45 - 80 % Final     Lymphocyte Rel %   Date Value Ref Range Status   02/08/2020 32.4 15 - 50 % Final     Monocyte Rel %   Date Value Ref Range Status   02/08/2020 7.8 0 - 15 % Final     Eosinophil %   Date Value Ref  Range Status   02/08/2020 2.7 0 - 7 % Final     Basophil Rel %   Date Value Ref Range Status   02/08/2020 0.6 0 - 2 % Final     Immature Grans %   Date Value Ref Range Status   02/08/2020 0.2 (H) 0 % Final     Neutrophils Absolute   Date Value Ref Range Status   02/08/2020 2.97 2.0 - 8.8 10*3/uL Final     Lymphocytes Absolute   Date Value Ref Range Status   02/08/2020 1.71 0.7 - 5.5 10*3/uL Final     Monocytes Absolute   Date Value Ref Range Status   02/08/2020 0.41 0.0 - 1.7 10*3/uL Final     Eosinophils Absolute   Date Value Ref Range Status   02/08/2020 0.14 0.0 - 0.8 10*3/uL Final     Basophils Absolute   Date Value Ref Range Status   02/08/2020 0.03 0.0 - 0.2 10*3/uL Final     Immature Grans, Absolute   Date Value Ref Range Status   02/08/2020 0.01 <1 10*3/uL Final     nRBC   Date Value Ref Range Status   02/08/2020 0 0 /100(WBC) Final       Lab Results   Component Value Date    HGBA1C 6.0 (H) 02/08/2020       Lab Results   Component Value Date    JZCMIZHH67 330 09/30/2019       TSH   Date Value Ref Range Status   09/30/2019 0.748 0.270 - 4.200 uIU/mL Final       No results found for: CHOL  Lab Results   Component Value Date    TRIG 78 02/08/2020     Lab Results   Component Value Date    HDL 72 02/08/2020     Lab Results   Component Value Date    LDL 62 02/08/2020     Lab Results   Component Value Date    VLDL 16 02/08/2020     No results found for: LDLHDL      Procedures    Assessment/Plan   Problems Addressed this Visit     Colon polyps    Relevant Orders    Ambulatory Referral to Gastroenterology    Depression    Relevant Medications    temazepam (RESTORIL) 30 MG capsule    Encounter for annual health examination - Primary    Essential hypertension    Relevant Orders    Lipid Panel With / Chol / HDL Ratio    Fatigue    Relevant Orders    CBC & Differential    Comprehensive Metabolic Panel    Vitamin B12    TSH Rfx On Abnormal To Free T4    CT Chest With Contrast Diagnostic    CT Abdomen Pelvis With Contrast     IBS (irritable bowel syndrome)    Unintentional weight loss    Relevant Orders    CBC & Differential    Comprehensive Metabolic Panel    Vitamin B12    TSH Rfx On Abnormal To Free T4    CT Chest With Contrast Diagnostic    CT Abdomen Pelvis With Contrast      Other Visit Diagnoses     Breast cancer screening by mammogram        Relevant Orders    Mammo Screening Bilateral With CAD      Diagnoses       Codes Comments    Encounter for annual health examination    -  Primary ICD-10-CM: Z00.00  ICD-9-CM: V70.0     Essential hypertension     ICD-10-CM: I10  ICD-9-CM: 401.9     Recurrent major depressive disorder, in partial remission (HCC)     ICD-10-CM: F33.41  ICD-9-CM: 296.35     Polyp of colon, unspecified part of colon, unspecified type     ICD-10-CM: K63.5  ICD-9-CM: 211.3     Chronic fatigue     ICD-10-CM: R53.82  ICD-9-CM: 780.79     Unintentional weight loss     ICD-10-CM: R63.4  ICD-9-CM: 783.21     Breast cancer screening by mammogram     ICD-10-CM: Z12.31  ICD-9-CM: V76.12     Irritable bowel syndrome with diarrhea     ICD-10-CM: K58.0  ICD-9-CM: 564.1       Fatigue.  Check labs.    Colon polyp.  Get C scope.     Depression.  Controlled.  Continue meds.    Unintentional Weight loss.  Check CT with contrast of chest and abd/pelvis.  Check labs.      Preventive Counseling:  Encouraged to stay active.  Flu shot today.  Encouraged to get covid shot.  Check C-scope.  Check mammo.      Orders Placed This Encounter   Procedures   • Mammo Screening Bilateral With CAD     Standing Status:   Future     Standing Expiration Date:   10/20/2022   • CT Chest With Contrast Diagnostic     Standing Status:   Future     Standing Expiration Date:   10/19/2022   • CT Abdomen Pelvis With Contrast     Standing Status:   Future     Standing Expiration Date:   10/19/2022     Order Specific Question:   Will Oral Contrast be needed for this procedure?     Answer:   Yes   • FluLaval/Fluarix/Fluzone >6 Months   • Comprehensive  Metabolic Panel     Order Specific Question:   Release to patient     Answer:   Immediate   • Lipid Panel With / Chol / HDL Ratio     Order Specific Question:   Release to patient     Answer:   Immediate   • Vitamin B12     Order Specific Question:   Release to patient     Answer:   Immediate   • TSH Rfx On Abnormal To Free T4     Order Specific Question:   Release to patient     Answer:   Immediate   • Ambulatory Referral to Gastroenterology     Referral Priority:   Routine     Referral Type:   Consultation     Referral Reason:   Specialty Services Required     Requested Specialty:   Gastroenterology     Number of Visits Requested:   1   • CBC & Differential     Order Specific Question:   Manual Differential     Answer:   No       Current Outpatient Medications   Medication Sig Dispense Refill   • amLODIPine (NORVASC) 5 MG tablet Take 1 tablet by mouth Daily. 90 tablet 3   • clonazePAM (KlonoPIN) 2 MG tablet TK 1/2 T PO QID PRN     • dexlansoprazole (DEXILANT) 60 MG capsule Take 1 capsule by mouth Daily. 90 capsule 3   • diphenoxylate-atropine (LOMOTIL) 2.5-0.025 MG per tablet TAKE 1 TABLET BY MOUTH FOUR TIMES DAILY AS NEEDED FOR CRAMPING OR DIARRHEA 90 tablet 1   • DULoxetine (CYMBALTA) 60 MG capsule Take 120 mg by mouth Daily.     • mirtazapine (REMERON) 15 MG tablet TK SS TO ONE T PO QHS.     • ondansetron ODT (ZOFRAN-ODT) 8 MG disintegrating tablet DISSOLVE 1 TABLET ON THE TONGUE EVERY 8 HOURS AS NEEDED FOR NAUSEA OR VOMITING 30 tablet 1   • oxybutynin XL (DITROPAN-XL) 10 MG 24 hr tablet Take 1 tablet by mouth Daily. 90 tablet 3   • promethazine (PHENERGAN) 25 MG tablet TAKE 1 TABLET BY MOUTH EVERY 8 HOURS AS NEEDED FOR NAUSEA 45 tablet 3   • temazepam (RESTORIL) 30 MG capsule Take 30 mg by mouth every night at bedtime.       No current facility-administered medications for this visit.       Return in about 3 months (around 1/19/2022).    There are no Patient Instructions on file for this visit.

## 2021-10-20 LAB
ALBUMIN SERPL-MCNC: 4.4 G/DL (ref 3.8–4.9)
ALBUMIN/GLOB SERPL: 1.6 {RATIO} (ref 1.2–2.2)
ALP SERPL-CCNC: 84 IU/L (ref 44–121)
ALT SERPL-CCNC: 11 IU/L (ref 0–32)
AST SERPL-CCNC: 19 IU/L (ref 0–40)
BASOPHILS # BLD AUTO: 0.1 X10E3/UL (ref 0–0.2)
BASOPHILS NFR BLD AUTO: 1 %
BILIRUB SERPL-MCNC: 0.4 MG/DL (ref 0–1.2)
BUN SERPL-MCNC: 12 MG/DL (ref 8–27)
BUN/CREAT SERPL: 13 (ref 12–28)
CALCIUM SERPL-MCNC: 9.6 MG/DL (ref 8.7–10.3)
CHLORIDE SERPL-SCNC: 101 MMOL/L (ref 96–106)
CHOLEST SERPL-MCNC: 182 MG/DL (ref 100–199)
CHOLEST/HDLC SERPL: 2.6 RATIO (ref 0–4.4)
CO2 SERPL-SCNC: 27 MMOL/L (ref 20–29)
CREAT SERPL-MCNC: 0.9 MG/DL (ref 0.57–1)
EOSINOPHIL # BLD AUTO: 0.1 X10E3/UL (ref 0–0.4)
EOSINOPHIL NFR BLD AUTO: 1 %
ERYTHROCYTE [DISTWIDTH] IN BLOOD BY AUTOMATED COUNT: 11.9 % (ref 11.7–15.4)
GLOBULIN SER CALC-MCNC: 2.8 G/DL (ref 1.5–4.5)
GLUCOSE SERPL-MCNC: 92 MG/DL (ref 65–99)
HCT VFR BLD AUTO: 44.2 % (ref 34–46.6)
HDLC SERPL-MCNC: 71 MG/DL
HGB BLD-MCNC: 14.7 G/DL (ref 11.1–15.9)
IMM GRANULOCYTES # BLD AUTO: 0 X10E3/UL (ref 0–0.1)
IMM GRANULOCYTES NFR BLD AUTO: 0 %
LDLC SERPL CALC-MCNC: 98 MG/DL (ref 0–99)
LYMPHOCYTES # BLD AUTO: 1.7 X10E3/UL (ref 0.7–3.1)
LYMPHOCYTES NFR BLD AUTO: 22 %
MCH RBC QN AUTO: 29.9 PG (ref 26.6–33)
MCHC RBC AUTO-ENTMCNC: 33.3 G/DL (ref 31.5–35.7)
MCV RBC AUTO: 90 FL (ref 79–97)
MONOCYTES # BLD AUTO: 0.4 X10E3/UL (ref 0.1–0.9)
MONOCYTES NFR BLD AUTO: 6 %
NEUTROPHILS # BLD AUTO: 5.4 X10E3/UL (ref 1.4–7)
NEUTROPHILS NFR BLD AUTO: 70 %
PLATELET # BLD AUTO: 216 X10E3/UL (ref 150–450)
POTASSIUM SERPL-SCNC: 4.8 MMOL/L (ref 3.5–5.2)
PROT SERPL-MCNC: 7.2 G/DL (ref 6–8.5)
RBC # BLD AUTO: 4.92 X10E6/UL (ref 3.77–5.28)
SODIUM SERPL-SCNC: 142 MMOL/L (ref 134–144)
TRIGL SERPL-MCNC: 68 MG/DL (ref 0–149)
TSH SERPL DL<=0.005 MIU/L-ACNC: 1.06 UIU/ML (ref 0.45–4.5)
VIT B12 SERPL-MCNC: 537 PG/ML (ref 232–1245)
VLDLC SERPL CALC-MCNC: 13 MG/DL (ref 5–40)
WBC # BLD AUTO: 7.7 X10E3/UL (ref 3.4–10.8)

## 2021-10-21 ENCOUNTER — TELEPHONE (OUTPATIENT)
Dept: FAMILY MEDICINE CLINIC | Facility: CLINIC | Age: 61
End: 2021-10-21

## 2021-10-21 NOTE — TELEPHONE ENCOUNTER
Hub staff attempted to follow warm transfer process and was unsuccessful     Caller: Christie Hendricks    Relationship to patient: Self    Best call back number: [phone]    Patient is needing: THE PATIENT CALLED IN TO SPEAK WITH ANDRIY REGARDING HER 'S RECENT HOSPITALIZATION.

## 2021-10-22 NOTE — TELEPHONE ENCOUNTER
Spoke with patient, she was contacting office regarding patient's [place] refill. Advised pt's wife his refill was sent in yesterday. She verbalized understanding.

## 2021-11-01 ENCOUNTER — HOSPITAL ENCOUNTER (OUTPATIENT)
Dept: MAMMOGRAPHY | Facility: HOSPITAL | Age: 61
Discharge: HOME | End: 2021-11-01
Admitting: FAMILY MEDICINE

## 2021-11-01 DIAGNOSIS — Z12.31 BREAST CANCER SCREENING BY MAMMOGRAM: ICD-10-CM

## 2021-11-01 PROCEDURE — 77063 BREAST TOMOSYNTHESIS BI: CPT

## 2021-11-01 PROCEDURE — 77067 SCR MAMMO BI INCL CAD: CPT

## 2021-11-06 ENCOUNTER — HOSPITAL ENCOUNTER (OUTPATIENT)
Dept: CT IMAGING | Facility: HOSPITAL | Age: 61
Discharge: HOME | End: 2021-11-06
Admitting: FAMILY MEDICINE

## 2021-11-06 DIAGNOSIS — R63.4 UNINTENTIONAL WEIGHT LOSS: ICD-10-CM

## 2021-11-06 DIAGNOSIS — R53.82 CHRONIC FATIGUE: ICD-10-CM

## 2021-11-06 LAB — CREAT BLDA-MCNC: 1.1 MG/DL (ref 0.6–1.3)

## 2021-11-06 PROCEDURE — 0 DIATRIZOATE MEGLUMINE & SODIUM PER 1 ML: Performed by: FAMILY MEDICINE

## 2021-11-06 PROCEDURE — 25010000002 IOPAMIDOL 61 % SOLUTION: Performed by: FAMILY MEDICINE

## 2021-11-06 PROCEDURE — 74177 CT ABD & PELVIS W/CONTRAST: CPT

## 2021-11-06 PROCEDURE — 82565 ASSAY OF CREATININE: CPT

## 2021-11-06 PROCEDURE — 71260 CT THORAX DX C+: CPT

## 2021-11-06 RX ADMIN — IOPAMIDOL 85 ML: 612 INJECTION, SOLUTION INTRAVENOUS at 09:18

## 2021-11-06 RX ADMIN — DIATRIZOATE MEGLUMINE AND DIATRIZOATE SODIUM 30 ML: 600; 100 SOLUTION ORAL; RECTAL at 08:17

## 2021-11-09 DIAGNOSIS — I77.1 ILIAC ARTERY STENOSIS, BILATERAL (HCC): Primary | ICD-10-CM

## 2021-11-09 PROBLEM — R91.8 LUNG NODULES: Status: ACTIVE | Noted: 2021-11-09

## 2022-01-05 RX ORDER — PROMETHAZINE HYDROCHLORIDE 25 MG/1
TABLET ORAL
Qty: 45 TABLET | Refills: 3 | OUTPATIENT
Start: 2022-01-05

## 2022-01-12 ENCOUNTER — TELEMEDICINE (OUTPATIENT)
Dept: FAMILY MEDICINE CLINIC | Facility: CLINIC | Age: 62
End: 2022-01-12

## 2022-01-12 DIAGNOSIS — R50.9 FEVER, UNSPECIFIED FEVER CAUSE: Primary | ICD-10-CM

## 2022-01-12 DIAGNOSIS — R11.0 NAUSEA: ICD-10-CM

## 2022-01-12 DIAGNOSIS — J06.9 UPPER RESPIRATORY TRACT INFECTION, UNSPECIFIED TYPE: ICD-10-CM

## 2022-01-12 PROCEDURE — 99214 OFFICE O/P EST MOD 30 MIN: CPT | Performed by: FAMILY MEDICINE

## 2022-01-12 RX ORDER — ONDANSETRON 8 MG/1
TABLET, ORALLY DISINTEGRATING ORAL
Qty: 30 TABLET | Refills: 3 | Status: SHIPPED | OUTPATIENT
Start: 2022-01-12 | End: 2022-05-19

## 2022-01-12 NOTE — PROGRESS NOTES
C/o ill for 3 days.      Love Hendricks is a 61 y.o. female.     History of Present Illness   Video visit due to covid symptoms.  12 minute visit.  Consent obtained.   C/o 3 days ago with diarrhea and nausea.  Yesterday with fatigue, lifeless, and rhinorrhea.  Loss of taste or smell.  No covid immunizations.  Having fever as well up to 102.  No SOA.    The following portions of the patient's history were reviewed and updated as appropriate: allergies, current medications, past family history, past medical history, past social history, past surgical history and problem list.    Review of Systems   Constitutional: Positive for fatigue and fever. Negative for appetite change.   HENT: Positive for rhinorrhea. Negative for nosebleeds and sore throat.    Eyes: Negative for blurred vision and visual disturbance.   Respiratory: Positive for cough. Negative for shortness of breath and wheezing.    Cardiovascular: Negative for chest pain and leg swelling.   Gastrointestinal: Negative for abdominal distention and abdominal pain.   Endocrine: Negative for cold intolerance and polyuria.   Genitourinary: Negative for dysuria and hematuria.   Musculoskeletal: Negative for arthralgias and myalgias.   Skin: Negative for color change and rash.   Neurological: Negative for weakness and confusion.   Psychiatric/Behavioral: Negative for agitation and depressed mood.       Patient Active Problem List   Diagnosis   • Vitamin B deficiency   • Rectal sphincter incontinence   • Raynaud's phenomenon   • Primary insomnia   • Pill dysphagia   • Persistent insomnia   • Overactive bladder   • Pancreatic disorder   • Non-ulcer dyspepsia   • Irritable bowel syndrome with diarrhea   • Insomnia   • IBS (irritable bowel syndrome)   • Hyperactivity of bladder   • History of colon polyps   • GERD without esophagitis   • JANEY (generalized anxiety disorder)   • Former smoker   • Fatigue   • Esophageal spasm   • Elevated amylase   • Dysphasia    • Depression   • Circadian rhythm sleep disorder   • Carpal tunnel syndrome   • Anxiety   • Allergic rhinitis   • Adrenal gland anomaly   • Essential hypertension   • Brain lesion   • Right arm numbness   • Deltoid tendonitis, right   • Acute bronchitis due to other specified organisms   • Mild mental slowing   • Rotator cuff tendonitis, right   • TIA (transient ischemic attack)   • Burn   • Encounter for annual health examination   • Colon polyps   • Unintentional weight loss   • Iliac artery stenosis, bilateral (HCC)   • Lung nodules   • Fever   • Nausea   • Upper respiratory infection       Allergies   Allergen Reactions   • Penicillins Hives and Shortness Of Breath     unknown         Current Outpatient Medications:   •  amLODIPine (NORVASC) 5 MG tablet, Take 1 tablet by mouth Daily., Disp: 90 tablet, Rfl: 3  •  clonazePAM (KlonoPIN) 2 MG tablet, TK 1/2 T PO QID PRN, Disp: , Rfl:   •  dexlansoprazole (DEXILANT) 60 MG capsule, Take 1 capsule by mouth Daily., Disp: 90 capsule, Rfl: 3  •  diphenoxylate-atropine (LOMOTIL) 2.5-0.025 MG per tablet, Take 1 tablet by mouth 3 (Three) Times a Day As Needed for Diarrhea., Disp: 90 tablet, Rfl: 2  •  DULoxetine (CYMBALTA) 60 MG capsule, Take 120 mg by mouth Daily., Disp: , Rfl:   •  mirtazapine (REMERON) 15 MG tablet, TK SS TO ONE T PO QHS., Disp: , Rfl:   •  ondansetron ODT (ZOFRAN-ODT) 8 MG disintegrating tablet, One TID prn nausea., Disp: 30 tablet, Rfl: 3  •  oxybutynin XL (DITROPAN-XL) 10 MG 24 hr tablet, Take 1 tablet by mouth Daily., Disp: 90 tablet, Rfl: 3  •  temazepam (RESTORIL) 30 MG capsule, Take 30 mg by mouth every night at bedtime., Disp: , Rfl:     Past Medical History:   Diagnosis Date   • Abdominal cramping    • Abdominal pain, epigastric    • Abdominal pain, lower    • Acute bronchitis    • Acute frontal sinusitis     History of    • Acute gastroenteritis    • Acute sinusitis    • Adrenal gland anomaly    • Allergic rhinitis    • Anxiety    • Arthritis     • Bloating    • Body aches    • Bowel incontinence    • Carpal tunnel syndrome    • Cervical spine disease    • Circadian rhythm sleep disorder    • Cough    • Decreased appetite    • Depression    • Diarrhea    • Difficulty walking    • Dysphasia    • Elevated amylase    • Esophageal spasm    • Fatigue    • Former smoker    • JANEY (generalized anxiety disorder)    • GERD without esophagitis    • Headache    • High risk medication use    • History of colon polyps    • Hyperactivity of bladder    • Hypertension    • IBS (irritable bowel syndrome)    • Insomnia    • Irritable bowel syndrome with diarrhea    • Nausea    • Non-ulcer dyspepsia    • Overactive bladder    • Pancreatic disorder    • Persistent insomnia    • Pill dysphagia    • Primary insomnia    • Raynaud's phenomenon    • Rectal itching    • Rectal sphincter incontinence    • Shingles    • Vision loss    • Vitamin B deficiency    • Weight loss        Past Surgical History:   Procedure Laterality Date   • CARPAL TUNNEL RELEASE Right 9/8/2021    Procedure: RIGHT CARPAL TUNNEL RELEASE, WITH SYNOVECTOMY, RIGHT VOLAR WRIST EXCISCION OF VOLAR GANGLION CYST;  Surgeon: Miranda Luke MD;  [place] [facility] [place]  Service: Plastics;  Laterality: Right;   • CERVICAL DISCECTOMY ANTERIOR     • CHOLECYSTECTOMY  2000   • COLONOSCOPY  12/2015    13 polyps removed   • NECK SURGERY     • TOOTH EXTRACTION     • TUBAL ABDOMINAL LIGATION     • UPPER GASTROINTESTINAL ENDOSCOPY  2005    [facility]  - normal per patient   • WRIST GANGLION EXCISION Right 9/8/2021    Procedure: RIGHT WRIST VOLAR GANGLION EXCISION;  Surgeon: Miranda Luke MD;  Location: [facility];  Service: Plastics;  Laterality: Right;       Family History   Problem Relation Age of Onset   • Arthritis Mother    • Aneurysm Mother    • Heart disease Mother    • Hypertension Mother    • Seizures Mother    • Stroke Mother    • Arthritis Father    • Heart disease Father    • Hypertension  Father    • No Known Problems Other    • Arthritis Maternal Grandmother    • Cancer Maternal Grandmother    • Heart disease Maternal Grandmother    • Hypertension Maternal Grandmother    • No Known Problems Maternal Grandfather    • No Known Problems Paternal Grandmother    • No Known Problems Paternal Grandfather    • Ovarian cancer Maternal Aunt    • Breast cancer Neg Hx        Social History     Tobacco Use   • Smoking status: Former Smoker     Packs/day: 1.00     Years: 30.00     Pack years: 30.00     Types: Cigarettes     Quit date: 2014     Years since quittin.2   • Smokeless tobacco: Never Used   Substance Use Topics   • Alcohol use: Yes     Comment: social alcohol use//cafeine 2 cups of coffee 1 glass of tea daily             Objective     There were no vitals filed for this visit.  There is no height or weight on file to calculate BMI.    Physical Exam  Pulmonary:      Effort: Pulmonary effort is normal.   Neurological:      Mental Status: She is alert and oriented to person, place, and time.   Psychiatric:         Mood and Affect: Mood normal.         Thought Content: Thought content normal.         Lab Results   Component Value Date    GLUCOSE 92 10/19/2021    BUN 12 10/19/2021    CREATININE 1.10 2021    EGFRIFNONA 70 10/19/2021    EGFRIFAFRI 80 10/19/2021    BCR 13 10/19/2021    K 4.8 10/19/2021    CO2 27 10/19/2021    CALCIUM 9.6 10/19/2021    PROTENTOTREF 7.2 10/19/2021    ALBUMIN 4.4 10/19/2021    LABIL2 1.6 10/19/2021    AST 19 10/19/2021    ALT 11 10/19/2021       WBC   Date Value Ref Range Status   10/19/2021 7.7 3.4 - 10.8 x10E3/uL Final   2020 5.27 4.5 - 11.0 10*3/uL Final     RBC   Date Value Ref Range Status   10/19/2021 4.92 3.77 - 5.28 x10E6/uL Final   2020 4.82 4.0 - 5.2 10*6/uL Final     Hemoglobin   Date Value Ref Range Status   10/19/2021 14.7 11.1 - 15.9 g/dL Final   2020 14.1 12.0 - 16.0 g/dL Final     Hematocrit   Date Value Ref Range Status    10/19/2021 44.2 34.0 - 46.6 % Final   02/08/2020 43.6 36.0 - 46.0 % Final     MCV   Date Value Ref Range Status   10/19/2021 90 79 - 97 fL Final   02/08/2020 90.5 80.0 - 100.0 fL Final     MCH   Date Value Ref Range Status   10/19/2021 29.9 26.6 - 33.0 pg Final   02/08/2020 29.3 26.0 - 34.0 pg Final     MCHC   Date Value Ref Range Status   10/19/2021 33.3 31.5 - 35.7 g/dL Final   02/08/2020 32.3 31.0 - 37.0 g/dL Final     RDW   Date Value Ref Range Status   10/19/2021 11.9 11.7 - 15.4 % Final   02/08/2020 13.6 12.0 - 16.8 % Final     MPV   Date Value Ref Range Status   02/08/2020 12.7 (H) 6.7 - 10.8 fL Final     Platelets   Date Value Ref Range Status   10/19/2021 216 150 - 450 x10E3/uL Final   02/08/2020 226 140 - 440 10*3/uL Final     Neutrophil Rel %   Date Value Ref Range Status   10/19/2021 70 Not Estab. % Final   02/08/2020 56.3 45 - 80 % Final     Lymphocyte Rel %   Date Value Ref Range Status   10/19/2021 22 Not Estab. % Final   02/08/2020 32.4 15 - 50 % Final     Monocyte Rel %   Date Value Ref Range Status   10/19/2021 6 Not Estab. % Final   02/08/2020 7.8 0 - 15 % Final     Eosinophil Rel %   Date Value Ref Range Status   10/19/2021 1 Not Estab. % Final     Eosinophil %   Date Value Ref Range Status   02/08/2020 2.7 0 - 7 % Final     Basophil Rel %   Date Value Ref Range Status   10/19/2021 1 Not Estab. % Final   02/08/2020 0.6 0 - 2 % Final     Immature Grans %   Date Value Ref Range Status   02/08/2020 0.2 (H) 0 % Final     Neutrophils Absolute   Date Value Ref Range Status   10/19/2021 5.4 1.4 - 7.0 x10E3/uL Final   02/08/2020 2.97 2.0 - 8.8 10*3/uL Final     Lymphocytes Absolute   Date Value Ref Range Status   10/19/2021 1.7 0.7 - 3.1 x10E3/uL Final   02/08/2020 1.71 0.7 - 5.5 10*3/uL Final     Monocytes Absolute   Date Value Ref Range Status   10/19/2021 0.4 0.1 - 0.9 x10E3/uL Final   02/08/2020 0.41 0.0 - 1.7 10*3/uL Final     Eosinophils Absolute   Date Value Ref Range Status   10/19/2021 0.1 0.0  - 0.4 x10E3/uL Final   02/08/2020 0.14 0.0 - 0.8 10*3/uL Final     Basophils Absolute   Date Value Ref Range Status   10/19/2021 0.1 0.0 - 0.2 x10E3/uL Final   02/08/2020 0.03 0.0 - 0.2 10*3/uL Final     Immature Grans, Absolute   Date Value Ref Range Status   02/08/2020 0.01 <1 10*3/uL Final     nRBC   Date Value Ref Range Status   02/08/2020 0 0 /100(WBC) Final       Lab Results   Component Value Date    HGBA1C 6.0 (H) 02/08/2020       Lab Results   Component Value Date    SVZZNELE51 537 10/19/2021       TSH   Date Value Ref Range Status   10/19/2021 1.060 0.450 - 4.500 uIU/mL Final       No results found for: CHOL  Lab Results   Component Value Date    TRIG 68 10/19/2021     Lab Results   Component Value Date    HDL 71 10/19/2021     Lab Results   Component Value Date    LDL 98 10/19/2021     Lab Results   Component Value Date    VLDL 13 10/19/2021     No results found for: LDLHDL      Procedures    Assessment/Plan   Problems Addressed this Visit     Fever - Primary    Nausea    Relevant Medications    ondansetron ODT (ZOFRAN-ODT) 8 MG disintegrating tablet    Upper respiratory infection      Diagnoses       Codes Comments    Fever, unspecified fever cause    -  Primary ICD-10-CM: R50.9  ICD-9-CM: 780.60     Nausea     ICD-10-CM: R11.0  ICD-9-CM: 787.02     Upper respiratory tract infection, unspecified type     ICD-10-CM: J06.9  ICD-9-CM: 465.9       Fever with nausea and URI symptoms.  Undiagnosed, new problem with systemic symptoms.  [facility] cov[facility].  Pt in agreement.  RX for ondansetron.  If SOA develops, then go to ER.  If positive covid, will get infusion set up if within 7 days of illness.   Off work 1/9-1/19/22.  RTW 1/20/22.    No orders of the defined types were placed in this encounter.      Current Outpatient Medications   Medication Sig Dispense Refill   • amLODIPine (NORVASC) 5 MG tablet Take 1 tablet by mouth Daily. 90 tablet 3   • clonazePAM (KlonoPIN) 2 MG tablet TK 1/2 T PO  QID PRN     • dexlansoprazole (DEXILANT) 60 MG capsule Take 1 capsule by mouth Daily. 90 capsule 3   • diphenoxylate-atropine (LOMOTIL) 2.5-0.025 MG per tablet Take 1 tablet by mouth 3 (Three) Times a Day As Needed for Diarrhea. 90 tablet 2   • DULoxetine (CYMBALTA) 60 MG capsule Take 120 mg by mouth Daily.     • mirtazapine (REMERON) 15 MG tablet TK SS TO ONE T PO QHS.     • ondansetron ODT (ZOFRAN-ODT) 8 MG disintegrating tablet One TID prn nausea. 30 tablet 3   • oxybutynin XL (DITROPAN-XL) 10 MG 24 hr tablet Take 1 tablet by mouth Daily. 90 tablet 3   • temazepam (RESTORIL) 30 MG capsule Take 30 mg by mouth every night at bedtime.       No current facility-administered medications for this visit.       Christie Hendricks had no medications administered during this visit.    No follow-ups on file.    There are no Patient Instructions on file for this visit.

## 2022-01-19 ENCOUNTER — TELEPHONE (OUTPATIENT)
Dept: FAMILY MEDICINE CLINIC | Facility: CLINIC | Age: 62
End: 2022-01-19

## 2022-01-19 NOTE — TELEPHONE ENCOUNTER
Caller: Christie Hendricks    Relationship: Self    Best call back number: [phone]     What is the best time to reach you:     Who are you requesting to speak with (clinical staff, provider,  specific staff member):     Do you know the name of the person who called:     What was the call regarding: PATIENT CALLING STATING SHE IS STILL HAVING SYMPTOMS SHE IS FATIGUE, DIARRHEA, FEVER, NO APPETITE OR SMELL, BODY ACHES SHE IS HAVING A LITTLE BIT OF A PRODUCTIVE COUGH WITH MUCUS NO COLOR TO IT SHE TOOK A HOME TEST TODAY AND CAME BACK POSITIVE. PATIENT WOULD LIKE AN EXTENSION OF THE WORK EXCUSE     Do you require a callback: YES

## 2022-01-19 NOTE — TELEPHONE ENCOUNTER
Spoke with patient. Will fax new work letter to patient's employer per patient request. Patient verbalized understanding of Dr Matos's instructions regarding quaratining and further future COVID testing.

## 2022-02-15 ENCOUNTER — TELEPHONE (OUTPATIENT)
Dept: FAMILY MEDICINE CLINIC | Facility: CLINIC | Age: 62
End: 2022-02-15

## 2022-02-15 NOTE — TELEPHONE ENCOUNTER
Caller: Christie Hendricks    Relationship: Self    Best call back number: [phone] PATIENT STATES A DETAILED VOICE MESSAGE CAN BE LEFT AT THIS NUMBER.    Which medication are you concerned about: diphenoxylate-atropine (LOMOTIL) 2.5-0.025 MG per tablet    Who prescribed you this medication: DR LOPEZ    What are your concerns: PATIENT STATES SHE USED TO TAKE THIS MEDICATION 4 TIMES A DAY BUT THE LAST TIME SHE GOT IT REFILLED, IT WAS CHANGED TO 3 TIMES A DAY.  SHE WOULD LIKE TO KNOW WHY THE DOSAGE CHANGED.

## 2022-02-15 NOTE — TELEPHONE ENCOUNTER
Tell her 3 times a day is all I write for.  IF she desires 4 times a day will have to come from GI doctor.

## 2022-02-16 NOTE — TELEPHONE ENCOUNTER
Patient is confused as to why she was originally prescribed by you with a frequency of 4 times and she has been changed with this prescription to 3 times daily.

## 2022-02-25 DIAGNOSIS — K58.0 IRRITABLE BOWEL SYNDROME WITH DIARRHEA: ICD-10-CM

## 2022-02-25 RX ORDER — DIPHENOXYLATE HYDROCHLORIDE AND ATROPINE SULFATE 2.5; .025 MG/1; MG/1
TABLET ORAL
Qty: 90 TABLET | Refills: 2 | Status: SHIPPED | OUTPATIENT
Start: 2022-02-25

## 2022-02-25 NOTE — TELEPHONE ENCOUNTER
Rx Refill Note  Requested Prescriptions     Pending Prescriptions Disp Refills   • diphenoxylate-atropine (LOMOTIL) 2.5-0.025 MG per tablet [Pharmacy Med Name: DIPHENOXYLATE/ATROPINE 2.5MG TABS] 90 tablet      Sig: TAKE 1 TABLET BY MOUTH THREE TIMES DAILY AS NEEDED FOR DIARRHEA      Last office visit with prescribing clinician: 10/19/2021      Next office visit with prescribing clinician: 4/14/2022           Last filled 10/19/2021           Linda Romero MA  02/25/22, 09:28 EST

## 2022-04-14 ENCOUNTER — OFFICE VISIT (OUTPATIENT)
Dept: FAMILY MEDICINE CLINIC | Facility: CLINIC | Age: 62
End: 2022-04-14

## 2022-04-14 VITALS
TEMPERATURE: 98 F | SYSTOLIC BLOOD PRESSURE: 120 MMHG | HEART RATE: 69 BPM | HEIGHT: 63 IN | OXYGEN SATURATION: 99 % | BODY MASS INDEX: 17.01 KG/M2 | DIASTOLIC BLOOD PRESSURE: 60 MMHG | WEIGHT: 96 LBS

## 2022-04-14 DIAGNOSIS — R63.4 UNINTENTIONAL WEIGHT LOSS: Primary | ICD-10-CM

## 2022-04-14 DIAGNOSIS — I10 ESSENTIAL HYPERTENSION: ICD-10-CM

## 2022-04-14 DIAGNOSIS — K58.0 IRRITABLE BOWEL SYNDROME WITH DIARRHEA: ICD-10-CM

## 2022-04-14 DIAGNOSIS — N89.8 VAGINAL DISCHARGE: ICD-10-CM

## 2022-04-14 PROCEDURE — 99214 OFFICE O/P EST MOD 30 MIN: CPT | Performed by: FAMILY MEDICINE

## 2022-04-14 RX ORDER — DESVENLAFAXINE SUCCINATE 50 MG/1
50 TABLET, EXTENDED RELEASE ORAL DAILY
COMMUNITY
Start: 2022-04-05

## 2022-04-14 RX ORDER — CLOPIDOGREL BISULFATE 75 MG/1
75 TABLET ORAL DAILY
COMMUNITY
Start: 2022-03-04 | End: 2023-01-25

## 2022-04-14 RX ORDER — METRONIDAZOLE 500 MG/1
500 TABLET ORAL 2 TIMES DAILY
Qty: 14 TABLET | Refills: 0 | Status: SHIPPED | OUTPATIENT
Start: 2022-04-14 | End: 2022-05-19

## 2022-04-14 RX ORDER — RIVAROXABAN 2.5 MG/1
2.5 TABLET, FILM COATED ORAL 2 TIMES DAILY
COMMUNITY
Start: 2022-04-07

## 2022-04-14 RX ORDER — ASPIRIN 81 MG/1
81 TABLET ORAL DAILY
COMMUNITY

## 2022-04-14 NOTE — PROGRESS NOTES
Chief Complaint   Patient presents with   • Hypertension       Subjective   Christie Hendricks is a 61 y.o. female.     History of Present Illness   F/U HTN.  Doing well without meds.    C/o vaginal odor and discharge.  Going on about 2 months.   No pelvic pain.    F/U IBS.  Doing well with lomotil 4 times a day.  Trouble with keeping weight on still.  C scope normal 3 years ago.  Scheduled for EGD and C scope upcoming.  CT abd/pelvis with high grade stenosis of L common iliac artery, 14 mm left adrenal nodule likely an adenoma.  CT chest with sub 6 mm nodules.  Mammo normal.  Down 6 lbs form 10/21.       The following portions of the patient's history were reviewed and updated as appropriate: allergies, current medications, past family history, past medical history, past social history, past surgical history and problem list.    Review of Systems   Constitutional: Positive for unexpected weight loss. Negative for appetite change and fatigue.   HENT: Negative for nosebleeds and sore throat.    Eyes: Negative for blurred vision and visual disturbance.   Respiratory: Negative for shortness of breath and wheezing.    Cardiovascular: Negative for chest pain and leg swelling.   Gastrointestinal: Positive for abdominal pain. Negative for abdominal distention.   Endocrine: Negative for cold intolerance and polyuria.   Genitourinary: Negative for dysuria and hematuria.   Musculoskeletal: Positive for back pain. Negative for arthralgias and myalgias.   Skin: Negative for color change and rash.   Neurological: Negative for weakness and confusion.   Psychiatric/Behavioral: Negative for agitation and depressed mood.       Patient Active Problem List   Diagnosis   • Vitamin B deficiency   • Rectal sphincter incontinence   • Raynaud's phenomenon   • Primary insomnia   • Pill dysphagia   • Persistent insomnia   • Overactive bladder   • Pancreatic disorder   • Non-ulcer dyspepsia   • Irritable bowel syndrome with diarrhea   •  Insomnia   • IBS (irritable bowel syndrome)   • Hyperactivity of bladder   • History of colon polyps   • GERD without esophagitis   • JANEY (generalized anxiety disorder)   • Former smoker   • Fatigue   • Esophageal spasm   • Elevated amylase   • Dysphasia   • Depression   • Circadian rhythm sleep disorder   • Carpal tunnel syndrome   • Anxiety   • Allergic rhinitis   • Adrenal gland anomaly   • Essential hypertension   • Brain lesion   • Right arm numbness   • Deltoid tendonitis, right   • Acute bronchitis due to other specified organisms   • Mild mental slowing   • Rotator cuff tendonitis, right   • TIA (transient ischemic attack)   • Burn   • Encounter for annual health examination   • Colon polyps   • Unintentional weight loss   • Iliac artery stenosis, bilateral (HCC)   • Lung nodules   • Fever   • Nausea   • Upper respiratory infection   • Vaginal discharge       Allergies   Allergen Reactions   • Penicillins Hives and Shortness Of Breath     unknown         Current Outpatient Medications:   •  aspirin 81 MG EC tablet, Take 81 mg by mouth Daily., Disp: , Rfl:   •  clonazePAM (KlonoPIN) 2 MG tablet, TK 1/2 T PO QID PRN, Disp: , Rfl:   •  clopidogrel (PLAVIX) 75 MG tablet, Take 75 mg by mouth Daily., Disp: , Rfl:   •  desvenlafaxine (PRISTIQ) 50 MG 24 hr tablet, Take 50 mg by mouth Daily., Disp: , Rfl:   •  dexlansoprazole (DEXILANT) 60 MG capsule, Take 1 capsule by mouth Daily., Disp: 90 capsule, Rfl: 3  •  diphenoxylate-atropine (LOMOTIL) 2.5-0.025 MG per tablet, TAKE 1 TABLET BY MOUTH THREE TIMES DAILY AS NEEDED FOR DIARRHEA, Disp: 90 tablet, Rfl: 2  •  ondansetron ODT (ZOFRAN-ODT) 8 MG disintegrating tablet, One TID prn nausea., Disp: 30 tablet, Rfl: 3  •  oxybutynin XL (DITROPAN-XL) 10 MG 24 hr tablet, Take 1 tablet by mouth Daily., Disp: 90 tablet, Rfl: 3  •  temazepam (RESTORIL) 30 MG capsule, Take 30 mg by mouth every night at bedtime., Disp: , Rfl:   •  Xarelto 2.5 MG tablet, Take 2.5 mg by mouth 2 (Two)  Times a Day., Disp: , Rfl:   •  metroNIDAZOLE (Flagyl) 500 MG tablet, Take 1 tablet by mouth 2 (Two) Times a Day., Disp: 14 tablet, Rfl: 0    Past Medical History:   Diagnosis Date   • Abdominal cramping    • Abdominal pain, epigastric    • Abdominal pain, lower    • Acute bronchitis    • Acute frontal sinusitis     History of    • Acute gastroenteritis    • Acute sinusitis    • Adrenal gland anomaly    • Allergic rhinitis    • Anxiety    • Arthritis    • Bloating    • Body aches    • Bowel incontinence    • Carpal tunnel syndrome    • Cervical spine disease    • Circadian rhythm sleep disorder    • Cough    • Decreased appetite    • Depression    • Diarrhea    • Difficulty walking    • Dysphasia    • Elevated amylase    • Esophageal spasm    • Fatigue    • Former smoker    • JANEY (generalized anxiety disorder)    • GERD without esophagitis    • Headache    • High risk medication use    • History of colon polyps    • Hyperactivity of bladder    • Hypertension    • IBS (irritable bowel syndrome)    • Insomnia    • Irritable bowel syndrome with diarrhea    • Nausea    • Non-ulcer dyspepsia    • Overactive bladder    • Pancreatic disorder    • Persistent insomnia    • Pill dysphagia    • Primary insomnia    • Raynaud's phenomenon    • Rectal itching    • Rectal sphincter incontinence    • Shingles    • Vision loss    • Vitamin B deficiency    • Weight loss        Past Surgical History:   Procedure Laterality Date   • CARPAL TUNNEL RELEASE Right 9/8/2021    Procedure: RIGHT CARPAL TUNNEL RELEASE, WITH SYNOVECTOMY, RIGHT VOLAR WRIST EXCISCION OF VOLAR GANGLION CYST;  Surgeon: Miranda Luke MD;  Location: [facility] MAIN OR;  Service: Plastics;  Laterality: Right;   • CERVICAL DISCECTOMY ANTERIOR     • CHOLECYSTECTOMY  2000   • COLONOSCOPY  12/2015    13 polyps removed   • NECK SURGERY     • TOOTH EXTRACTION     • TUBAL ABDOMINAL LIGATION     • UPPER GASTROINTESTINAL ENDOSCOPY  2005    [facility]  - normal per  patient   • WRIST GANGLION EXCISION Right 2021    Procedure: RIGHT WRIST VOLAR GANGLION EXCISION;  Surgeon: Miranda Luke MD;  Location: [facility] MAIN OR;  Service: Plastics;  Laterality: Right;       Family History   Problem Relation Age of Onset   • Arthritis Mother    • Aneurysm Mother    • Heart disease Mother    • Hypertension Mother    • Seizures Mother    • Stroke Mother    • Arthritis Father    • Heart disease Father    • Hypertension Father    • No Known Problems Other    • Arthritis Maternal Grandmother    • Cancer Maternal Grandmother    • Heart disease Maternal Grandmother    • Hypertension Maternal Grandmother    • No Known Problems Maternal Grandfather    • No Known Problems Paternal Grandmother    • No Known Problems Paternal Grandfather    • Ovarian cancer Maternal Aunt    • Breast cancer Neg Hx        Social History     Tobacco Use   • Smoking status: Former Smoker     Packs/day: 1.00     Years: 30.00     Pack years: 30.00     Types: Cigarettes     Quit date: 2014     Years since quittin.5   • Smokeless tobacco: Never Used   Substance Use Topics   • Alcohol use: Not Currently     Comment: social alcohol use//cafeine 2 cups of coffee 1 glass of tea daily             Objective     Vitals:    22 0739   BP: 120/60   Pulse: 69   Temp: 98 °F (36.7 °C)   SpO2: 99%     Body mass index is 17.01 kg/m².    Physical Exam  Vitals reviewed.   Constitutional:       Appearance: She is well-developed. She is not diaphoretic.   HENT:      Head: Normocephalic and atraumatic.   Eyes:      General: No scleral icterus.     Pupils: Pupils are equal, round, and reactive to light.   Neck:      Thyroid: No thyromegaly.   Cardiovascular:      Rate and Rhythm: Normal rate and regular rhythm.      Heart sounds: No murmur heard.    No friction rub. No gallop.   Pulmonary:      Effort: Pulmonary effort is normal. No respiratory distress.      Breath sounds: No wheezing or rales.   Chest:      Chest wall: No  tenderness.   Abdominal:      General: Bowel sounds are normal. There is no distension.      Palpations: Abdomen is soft.      Tenderness: There is no abdominal tenderness.   Musculoskeletal:         General: No deformity. Normal range of motion.   Lymphadenopathy:      Cervical: No cervical adenopathy.   Skin:     General: Skin is warm and dry.      Findings: No rash.   Neurological:      Cranial Nerves: No cranial nerve deficit.      Motor: No abnormal muscle tone.         Lab Results   Component Value Date    GLUCOSE 92 10/19/2021    BUN 12 10/19/2021    CREATININE 1.10 11/06/2021    EGFRIFNONA 70 10/19/2021    EGFRIFAFRI 80 10/19/2021    BCR 13 10/19/2021    K 4.8 10/19/2021    CO2 27 10/19/2021    CALCIUM 9.6 10/19/2021    PROTENTOTREF 7.2 10/19/2021    ALBUMIN 4.4 10/19/2021    LABIL2 1.6 10/19/2021    AST 19 10/19/2021    ALT 11 10/19/2021       WBC   Date Value Ref Range Status   10/19/2021 7.7 3.4 - 10.8 x10E3/uL Final   02/08/2020 5.27 4.5 - 11.0 10*3/uL Final     RBC   Date Value Ref Range Status   10/19/2021 4.92 3.77 - 5.28 x10E6/uL Final   02/08/2020 4.82 4.0 - 5.2 10*6/uL Final     Hemoglobin   Date Value Ref Range Status   10/19/2021 14.7 11.1 - 15.9 g/dL Final   02/08/2020 14.1 12.0 - 16.0 g/dL Final     Hematocrit   Date Value Ref Range Status   10/19/2021 44.2 34.0 - 46.6 % Final   02/08/2020 43.6 36.0 - 46.0 % Final     MCV   Date Value Ref Range Status   10/19/2021 90 79 - 97 fL Final   02/08/2020 90.5 80.0 - 100.0 fL Final     MCH   Date Value Ref Range Status   10/19/2021 29.9 26.6 - 33.0 pg Final   02/08/2020 29.3 26.0 - 34.0 pg Final     MCHC   Date Value Ref Range Status   10/19/2021 33.3 31.5 - 35.7 g/dL Final   02/08/2020 32.3 31.0 - 37.0 g/dL Final     RDW   Date Value Ref Range Status   10/19/2021 11.9 11.7 - 15.4 % Final   02/08/2020 13.6 12.0 - 16.8 % Final     MPV   Date Value Ref Range Status   02/08/2020 12.7 (H) 6.7 - 10.8 fL Final     Platelets   Date Value Ref Range Status    10/19/2021 216 150 - 450 x10E3/uL Final   02/08/2020 226 140 - 440 10*3/uL Final     Neutrophil Rel %   Date Value Ref Range Status   10/19/2021 70 Not Estab. % Final   02/08/2020 56.3 45 - 80 % Final     Lymphocyte Rel %   Date Value Ref Range Status   10/19/2021 22 Not Estab. % Final   02/08/2020 32.4 15 - 50 % Final     Monocyte Rel %   Date Value Ref Range Status   10/19/2021 6 Not Estab. % Final   02/08/2020 7.8 0 - 15 % Final     Eosinophil Rel %   Date Value Ref Range Status   10/19/2021 1 Not Estab. % Final     Eosinophil %   Date Value Ref Range Status   02/08/2020 2.7 0 - 7 % Final     Basophil Rel %   Date Value Ref Range Status   10/19/2021 1 Not Estab. % Final   02/08/2020 0.6 0 - 2 % Final     Immature Grans %   Date Value Ref Range Status   02/08/2020 0.2 (H) 0 % Final     Neutrophils Absolute   Date Value Ref Range Status   10/19/2021 5.4 1.4 - 7.0 x10E3/uL Final   02/08/2020 2.97 2.0 - 8.8 10*3/uL Final     Lymphocytes Absolute   Date Value Ref Range Status   10/19/2021 1.7 0.7 - 3.1 x10E3/uL Final   02/08/2020 1.71 0.7 - 5.5 10*3/uL Final     Monocytes Absolute   Date Value Ref Range Status   10/19/2021 0.4 0.1 - 0.9 x10E3/uL Final   02/08/2020 0.41 0.0 - 1.7 10*3/uL Final     Eosinophils Absolute   Date Value Ref Range Status   10/19/2021 0.1 0.0 - 0.4 x10E3/uL Final   02/08/2020 0.14 0.0 - 0.8 10*3/uL Final     Basophils Absolute   Date Value Ref Range Status   10/19/2021 0.1 0.0 - 0.2 x10E3/uL Final   02/08/2020 0.03 0.0 - 0.2 10*3/uL Final     Immature Grans, Absolute   Date Value Ref Range Status   02/08/2020 0.01 <1 10*3/uL Final     nRBC   Date Value Ref Range Status   02/08/2020 0 0 /100(WBC) Final       Lab Results   Component Value Date    HGBA1C 6.0 (H) 02/08/2020       Lab Results   Component Value Date    ECDRGDZX56 537 10/19/2021       TSH   Date Value Ref Range Status   10/19/2021 1.060 0.450 - 4.500 uIU/mL Final       No results found for: CHOL  Lab Results   Component Value Date     TRIG 68 10/19/2021     Lab Results   Component Value Date    HDL 71 10/19/2021     Lab Results   Component Value Date    LDL 98 10/19/2021     Lab Results   Component Value Date    VLDL 13 10/19/2021     No results found for: LDLHDL      Procedures    Assessment/Plan   Problems Addressed this Visit     Essential hypertension    IBS (irritable bowel syndrome)    Unintentional weight loss - Primary    Vaginal discharge    Relevant Medications    metroNIDAZOLE (Flagyl) 500 MG tablet      Diagnoses       Codes Comments    Unintentional weight loss    -  Primary ICD-10-CM: R63.4  ICD-9-CM: 783.21     Essential hypertension     ICD-10-CM: I10  ICD-9-CM: 401.9     Irritable bowel syndrome with diarrhea     ICD-10-CM: K58.0  ICD-9-CM: 564.1     Vaginal discharge     ICD-10-CM: N89.8  ICD-9-CM: 623.5         Uninitentional wt loss.  CT abd/pelvis as above reviewed.  Labs from 10/21 with CBC, CMP, thyroid normal.  Check EGD and c scope upcoming.     IBS.  Uncontrolled.  Continue lomotil.    Vaginal discharge.  Tx for presumptive BV with flagyl 500 BID for 7 days.    Get pap smear scheduled.   Pt declines covid vaccine.     No orders of the defined types were placed in this encounter.      Current Outpatient Medications   Medication Sig Dispense Refill   • aspirin 81 MG EC tablet Take 81 mg by mouth Daily.     • clonazePAM (KlonoPIN) 2 MG tablet TK 1/2 T PO QID PRN     • clopidogrel (PLAVIX) 75 MG tablet Take 75 mg by mouth Daily.     • desvenlafaxine (PRISTIQ) 50 MG 24 hr tablet Take 50 mg by mouth Daily.     • dexlansoprazole (DEXILANT) 60 MG capsule Take 1 capsule by mouth Daily. 90 capsule 3   • diphenoxylate-atropine (LOMOTIL) 2.5-0.025 MG per tablet TAKE 1 TABLET BY MOUTH THREE TIMES DAILY AS NEEDED FOR DIARRHEA 90 tablet 2   • ondansetron ODT (ZOFRAN-ODT) 8 MG disintegrating tablet One TID prn nausea. 30 tablet 3   • oxybutynin XL (DITROPAN-XL) 10 MG 24 hr tablet Take 1 tablet by mouth Daily. 90 tablet 3   •  temazepam (RESTORIL) 30 MG capsule Take 30 mg by mouth every night at bedtime.     • Xarelto 2.5 MG tablet Take 2.5 mg by mouth 2 (Two) Times a Day.     • metroNIDAZOLE (Flagyl) 500 MG tablet Take 1 tablet by mouth 2 (Two) Times a Day. 14 tablet 0     No current facility-administered medications for this visit.       Milkacarofahad Ruthie had no medications administered during this visit.    Return in about 3 months (around 7/14/2022).    There are no Patient Instructions on file for this visit.

## 2022-05-19 ENCOUNTER — OFFICE VISIT (OUTPATIENT)
Dept: FAMILY MEDICINE CLINIC | Facility: CLINIC | Age: 62
End: 2022-05-19

## 2022-05-19 VITALS
TEMPERATURE: 97.8 F | HEART RATE: 74 BPM | WEIGHT: 97 LBS | HEIGHT: 63 IN | SYSTOLIC BLOOD PRESSURE: 134 MMHG | DIASTOLIC BLOOD PRESSURE: 85 MMHG | BODY MASS INDEX: 17.19 KG/M2 | OXYGEN SATURATION: 99 %

## 2022-05-19 DIAGNOSIS — E55.9 VITAMIN D DEFICIENCY: ICD-10-CM

## 2022-05-19 DIAGNOSIS — Z00.00 HEALTH MAINTENANCE EXAMINATION: Primary | ICD-10-CM

## 2022-05-19 DIAGNOSIS — R63.4 WEIGHT LOSS: ICD-10-CM

## 2022-05-19 DIAGNOSIS — Z23 IMMUNIZATION DUE: ICD-10-CM

## 2022-05-19 DIAGNOSIS — Z78.0 POSTMENOPAUSE: ICD-10-CM

## 2022-05-19 DIAGNOSIS — Z01.419 ENCOUNTER FOR GYNECOLOGICAL EXAMINATION WITHOUT ABNORMAL FINDING: ICD-10-CM

## 2022-05-19 DIAGNOSIS — I10 ESSENTIAL HYPERTENSION: ICD-10-CM

## 2022-05-19 DIAGNOSIS — Z12.4 PAP SMEAR FOR CERVICAL CANCER SCREENING: ICD-10-CM

## 2022-05-19 DIAGNOSIS — E53.9 VITAMIN B DEFICIENCY: ICD-10-CM

## 2022-05-19 DIAGNOSIS — R53.82 CHRONIC FATIGUE: ICD-10-CM

## 2022-05-19 PROCEDURE — 90471 IMMUNIZATION ADMIN: CPT | Performed by: FAMILY MEDICINE

## 2022-05-19 PROCEDURE — 99396 PREV VISIT EST AGE 40-64: CPT | Performed by: FAMILY MEDICINE

## 2022-05-19 PROCEDURE — 90750 HZV VACC RECOMBINANT IM: CPT | Performed by: FAMILY MEDICINE

## 2022-05-19 PROCEDURE — 99214 OFFICE O/P EST MOD 30 MIN: CPT | Performed by: FAMILY MEDICINE

## 2022-05-19 RX ORDER — ONDANSETRON HYDROCHLORIDE 8 MG/1
TABLET, FILM COATED ORAL
COMMUNITY
Start: 2022-05-10 | End: 2022-09-01 | Stop reason: SDUPTHER

## 2022-05-19 RX ORDER — COLESEVELAM 180 1/1
TABLET ORAL
COMMUNITY
Start: 2022-05-11

## 2022-05-19 RX ORDER — PANTOPRAZOLE SODIUM 40 MG/1
40 TABLET, DELAYED RELEASE ORAL
COMMUNITY
Start: 2022-05-13 | End: 2023-05-13

## 2022-05-19 NOTE — PROGRESS NOTES
"Christie Hendricks is here today for an annual physical exam.     Eating a healthy diet. Exercising routinely. no  Regular periods. Wears seat belt. Feels safe at home.   Sexual activity:yes  Birth control:n/a  Pregnancy:  Sexual and gender orientation:h/s    PHQ-2 Depression Screening  Little interest or pleasure in doing things?  0   Feeling down, depressed, or hopeless?  0   PHQ-2 Total Score  0         I have reviewed the patient's medical, family, and social history in detail and updated the computerized patient record.    Screening history:  Colonoscopy - up to date  Mammogram- 2021, Pap/pelvic - today  Metabolic -underweight  Vision and dental up to date    Health Maintenance   Topic Date Due   • HEPATITIS C SCREENING  Never done   • ZOSTER VACCINE (2 of 3) 03/15/2021   • COVID-19 Vaccine (1) 05/21/2022 (Originally 12/11/1965)   • INFLUENZA VACCINE  08/01/2022   • ANNUAL PHYSICAL  10/20/2022   • LUNG CANCER SCREENING  11/06/2022   • MAMMOGRAM  11/01/2023   • PAP SMEAR  05/19/2025   • COLORECTAL CANCER SCREENING  12/30/2025   • TDAP/TD VACCINES (2 - Td or Tdap) 04/28/2026   • Pneumococcal Vaccine 0-64  Aged Out       Review of Systems   Constitutional: Positive for unexpected weight change.       /85 (BP Location: Right arm, Patient Position: Sitting)   Pulse 74   Temp 97.8 °F (36.6 °C)   Ht 160 cm (62.99\")   Wt 44 kg (97 lb)   SpO2 99%   BMI 17.19 kg/m²      Physical Exam      Physical Exam  Vitals and nursing note reviewed.   Constitutional:       General: She is not in acute distress.     Appearance: She is well-developed. She is not diaphoretic.   Cardiovascular:      Rate and Rhythm: Normal rate and regular rhythm.   Pulmonary:      Effort: Pulmonary effort is normal. No respiratory distress.      Breath sounds: Normal breath sounds. No wheezing.   Genitourinary:     Vagina: Vaginal discharge present.      Comments: Normal Gyn exam  NL B breast exam      No visits with results within 2 Week(s) " from this visit.   Latest known visit with results is:   Hospital Outpatient Visit on 11/06/2021   Component Date Value Ref Range Status   • Creatinine 11/06/2021 1.10  0.60 - 1.30 mg/dL Final    Serial Number: [id number]:  [id number]         Current Outpatient Medications:   •  aspirin 81 MG EC tablet, Take 81 mg by mouth Daily., Disp: , Rfl:   •  clonazePAM (KlonoPIN) 2 MG tablet, TK 1/2 T PO QID PRN, Disp: , Rfl:   •  clopidogrel (PLAVIX) 75 MG tablet, Take 75 mg by mouth Daily., Disp: , Rfl:   •  colesevelam (WELCHOL) 625 MG tablet, TAKE 3 TABLETS BY MOUTH EVERY MORNING TO PREVENT DIARRHEA, Disp: , Rfl:   •  desvenlafaxine (PRISTIQ) 50 MG 24 hr tablet, Take 50 mg by mouth Daily., Disp: , Rfl:   •  diphenoxylate-atropine (LOMOTIL) 2.5-0.025 MG per tablet, TAKE 1 TABLET BY MOUTH THREE TIMES DAILY AS NEEDED FOR DIARRHEA, Disp: 90 tablet, Rfl: 2  •  ondansetron (ZOFRAN) 8 MG tablet,  mg Tab, Oral, TID, PRN as needed, 0 Refill(s), Disp: , Rfl:   •  oxybutynin XL (DITROPAN-XL) 10 MG 24 hr tablet, Take 1 tablet by mouth Daily., Disp: 90 tablet, Rfl: 3  •  pantoprazole (PROTONIX) 40 MG EC tablet, Take 40 mg by mouth., Disp: , Rfl:   •  temazepam (RESTORIL) 30 MG capsule, Take 30 mg by mouth every night at bedtime., Disp: , Rfl:   •  Xarelto 2.5 MG tablet, Take 2.5 mg by mouth 2 (Two) Times a Day., Disp: , Rfl:   •  dexlansoprazole (DEXILANT) 60 MG capsule, Take 1 capsule by mouth Daily., Disp: 90 capsule, Rfl: 3    Diagnoses and all orders for this visit:    1. Health maintenance examination (Primary)    2. Immunization due  -     Shingrix Vaccine    3. Essential hypertension  -     Comprehensive Metabolic Panel  -     Lipid Panel    4. Vitamin B deficiency  -     Vitamin B12 & Folate    5. Chronic fatigue  -     Vitamin B12 & Folate  -     Vitamin D 25 Hydroxy  -     TSH Rfx On Abnormal To Free T4  -     CBC & Differential    6. Weight loss  -     Vitamin B12 & Folate  -     Vitamin D 25 Hydroxy  -     TSH Rfx On  Abnormal To Free T4  -     CBC & Differential    7. Vitamin D deficiency  -     Vitamin D 25 Hydroxy    8. Postmenopause  -     DEXA Bone Density Axial; Future    9. Encounter for gynecological examination without abnormal finding    10. Pap smear for cervical cancer screening      Preventive guidance given  Encourage healthy diet and exercise.  Encourage patient to stay up to date on screening examinations as indicated based on age and risk factors. F/U yearly.

## 2022-05-20 DIAGNOSIS — E55.9 VITAMIN D DEFICIENCY: Primary | ICD-10-CM

## 2022-05-20 LAB
25(OH)D3+25(OH)D2 SERPL-MCNC: 20.8 NG/ML (ref 30–100)
ALBUMIN SERPL-MCNC: 4.7 G/DL (ref 3.8–4.8)
ALBUMIN/GLOB SERPL: 2.1 {RATIO} (ref 1.2–2.2)
ALP SERPL-CCNC: 92 IU/L (ref 44–121)
ALT SERPL-CCNC: 13 IU/L (ref 0–32)
AST SERPL-CCNC: 20 IU/L (ref 0–40)
BASOPHILS # BLD AUTO: 0.1 X10E3/UL (ref 0–0.2)
BASOPHILS NFR BLD AUTO: 1 %
BILIRUB SERPL-MCNC: 0.3 MG/DL (ref 0–1.2)
BUN SERPL-MCNC: 14 MG/DL (ref 8–27)
BUN/CREAT SERPL: 16 (ref 12–28)
CALCIUM SERPL-MCNC: 9.8 MG/DL (ref 8.7–10.3)
CHLORIDE SERPL-SCNC: 100 MMOL/L (ref 96–106)
CHOLEST SERPL-MCNC: 165 MG/DL (ref 100–199)
CO2 SERPL-SCNC: 26 MMOL/L (ref 20–29)
CREAT SERPL-MCNC: 0.88 MG/DL (ref 0.57–1)
EGFRCR SERPLBLD CKD-EPI 2021: 75 ML/MIN/1.73
EOSINOPHIL # BLD AUTO: 0.1 X10E3/UL (ref 0–0.4)
EOSINOPHIL NFR BLD AUTO: 1 %
ERYTHROCYTE [DISTWIDTH] IN BLOOD BY AUTOMATED COUNT: 12.5 % (ref 11.7–15.4)
FOLATE SERPL-MCNC: 4.1 NG/ML
GLOBULIN SER CALC-MCNC: 2.2 G/DL (ref 1.5–4.5)
GLUCOSE SERPL-MCNC: 93 MG/DL (ref 65–99)
HCT VFR BLD AUTO: 48.3 % (ref 34–46.6)
HDLC SERPL-MCNC: 65 MG/DL
HGB BLD-MCNC: 15.8 G/DL (ref 11.1–15.9)
IMM GRANULOCYTES # BLD AUTO: 0 X10E3/UL (ref 0–0.1)
IMM GRANULOCYTES NFR BLD AUTO: 0 %
LDLC SERPL CALC-MCNC: 84 MG/DL (ref 0–99)
LYMPHOCYTES # BLD AUTO: 1.4 X10E3/UL (ref 0.7–3.1)
LYMPHOCYTES NFR BLD AUTO: 27 %
MCH RBC QN AUTO: 29.4 PG (ref 26.6–33)
MCHC RBC AUTO-ENTMCNC: 32.7 G/DL (ref 31.5–35.7)
MCV RBC AUTO: 90 FL (ref 79–97)
MONOCYTES # BLD AUTO: 0.4 X10E3/UL (ref 0.1–0.9)
MONOCYTES NFR BLD AUTO: 8 %
NEUTROPHILS # BLD AUTO: 3.3 X10E3/UL (ref 1.4–7)
NEUTROPHILS NFR BLD AUTO: 63 %
PLATELET # BLD AUTO: 241 X10E3/UL (ref 150–450)
POTASSIUM SERPL-SCNC: 4.7 MMOL/L (ref 3.5–5.2)
PROT SERPL-MCNC: 6.9 G/DL (ref 6–8.5)
RBC # BLD AUTO: 5.38 X10E6/UL (ref 3.77–5.28)
SODIUM SERPL-SCNC: 141 MMOL/L (ref 134–144)
TRIGL SERPL-MCNC: 85 MG/DL (ref 0–149)
TSH SERPL DL<=0.005 MIU/L-ACNC: 0.95 UIU/ML (ref 0.45–4.5)
VIT B12 SERPL-MCNC: 550 PG/ML (ref 232–1245)
VLDLC SERPL CALC-MCNC: 16 MG/DL (ref 5–40)
WBC # BLD AUTO: 5.3 X10E3/UL (ref 3.4–10.8)

## 2022-05-20 RX ORDER — CHOLECALCIFEROL (VITAMIN D3) 1250 MCG
50000 CAPSULE ORAL
Qty: 12 CAPSULE | Refills: 3 | Status: SHIPPED | OUTPATIENT
Start: 2022-05-20

## 2022-05-24 LAB
CYTOLOGIST CVX/VAG CYTO: ABNORMAL
CYTOLOGY CVX/VAG DOC CYTO: ABNORMAL
CYTOLOGY CVX/VAG DOC THIN PREP: ABNORMAL
DX ICD CODE: ABNORMAL
HIV 1 & 2 AB SER-IMP: ABNORMAL
HPV I/H RISK 4 DNA CVX QL PROBE+SIG AMP: POSITIVE
OTHER STN SPEC: ABNORMAL
STAT OF ADQ CVX/VAG CYTO-IMP: ABNORMAL

## 2022-06-15 ENCOUNTER — OFFICE VISIT (OUTPATIENT)
Dept: FAMILY MEDICINE CLINIC | Facility: CLINIC | Age: 62
End: 2022-06-15

## 2022-06-15 VITALS
OXYGEN SATURATION: 99 % | WEIGHT: 100 LBS | HEART RATE: 82 BPM | TEMPERATURE: 98.1 F | DIASTOLIC BLOOD PRESSURE: 80 MMHG | SYSTOLIC BLOOD PRESSURE: 110 MMHG | BODY MASS INDEX: 17.72 KG/M2 | HEIGHT: 63 IN

## 2022-06-15 DIAGNOSIS — E27.8 ADRENAL NODULE: ICD-10-CM

## 2022-06-15 DIAGNOSIS — R53.82 CHRONIC FATIGUE: Primary | ICD-10-CM

## 2022-06-15 DIAGNOSIS — U09.9 POST-COVID SYNDROME: ICD-10-CM

## 2022-06-15 PROCEDURE — 99214 OFFICE O/P EST MOD 30 MIN: CPT | Performed by: FAMILY MEDICINE

## 2022-06-15 NOTE — PROGRESS NOTES
Chief Complaint   Patient presents with   • Other     Wants to discuss recent labs from Dr. Menhcaca    • Hospital Follow Up Visit     [place]       Love Hendricks is a 61 y.o. female.     History of Present Illness   C/o trouble with chronic fatigue after diagnosis of Covid in December.  Trouble with wt loss.  Had to take 2 weeks off nonpaid from work from 6/2 onward.  Unable to work as had fevers initially.   Unable to work still due to fatigue.    Having work up with GI.  CT scan with high grade stenosis L common iliac artery with high grade stenosis R and L external iliac arteries.  Seeing vascular doctor.    Had L adrenal nodule.  Needs endocrine eval.  Cortisol level normal yesterday.     The following portions of the patient's history were reviewed and updated as appropriate: allergies, current medications, past family history, past medical history, past social history, past surgical history and problem list.    Review of Systems   Constitutional: Positive for fatigue. Negative for appetite change.   HENT: Negative for nosebleeds and sore throat.    Eyes: Negative for blurred vision and visual disturbance.   Respiratory: Negative for shortness of breath and wheezing.    Cardiovascular: Negative for chest pain and leg swelling.   Gastrointestinal: Negative for abdominal distention and abdominal pain.   Endocrine: Negative for cold intolerance and polyuria.   Genitourinary: Negative for dysuria and hematuria.   Musculoskeletal: Negative for arthralgias and myalgias.   Skin: Negative for color change and rash.   Neurological: Negative for weakness and confusion.   Psychiatric/Behavioral: Negative for agitation and depressed mood.       Patient Active Problem List   Diagnosis   • Vitamin B deficiency   • Rectal sphincter incontinence   • Raynaud's phenomenon   • Primary insomnia   • Pill dysphagia   • Persistent insomnia   • Overactive bladder   • Pancreatic disorder   • Non-ulcer dyspepsia   •  Irritable bowel syndrome with diarrhea   • Insomnia   • IBS (irritable bowel syndrome)   • Hyperactivity of bladder   • History of colon polyps   • GERD without esophagitis   • JANEY (generalized anxiety disorder)   • Former smoker   • Chronic fatigue   • Esophageal spasm   • Elevated amylase   • Dysphasia   • Depression   • Circadian rhythm sleep disorder   • Carpal tunnel syndrome   • Anxiety   • Allergic rhinitis   • Adrenal gland anomaly   • Essential hypertension   • Brain lesion   • Right arm numbness   • Deltoid tendonitis, right   • Acute bronchitis due to other specified organisms   • Mild mental slowing   • Rotator cuff tendonitis, right   • TIA (transient ischemic attack)   • Burn   • Pap smear for cervical cancer screening   • Colon polyps   • Weight loss   • Iliac artery stenosis, bilateral (HCC)   • Lung nodules   • Fever   • Nausea   • Upper respiratory infection   • Vaginal discharge   • Immunization due   • Vitamin D deficiency   • Postmenopause   • Adrenal nodule (HCC)   • Post-COVID syndrome       Allergies   Allergen Reactions   • Penicillin G Rash and Swelling     swelling of tonguehives swelling and difficulty breathing as a child   • Penicillins Hives and Shortness Of Breath     unknown         Current Outpatient Medications:   •  aspirin 81 MG EC tablet, Take 81 mg by mouth Daily., Disp: , Rfl:   •  Cholecalciferol (Vitamin D3) 1.25 MG (44979 UT) capsule, Take 1 capsule by mouth Every 7 (Seven) Days., Disp: 12 capsule, Rfl: 3  •  clonazePAM (KlonoPIN) 2 MG tablet, TK 1/2 T PO QID PRN, Disp: , Rfl:   •  clopidogrel (PLAVIX) 75 MG tablet, Take 75 mg by mouth Daily., Disp: , Rfl:   •  colesevelam (WELCHOL) 625 MG tablet, TAKE 3 TABLETS BY MOUTH EVERY MORNING TO PREVENT DIARRHEA, Disp: , Rfl:   •  desvenlafaxine (PRISTIQ) 50 MG 24 hr tablet, Take 50 mg by mouth Daily., Disp: , Rfl:   •  diphenoxylate-atropine (LOMOTIL) 2.5-0.025 MG per tablet, TAKE 1 TABLET BY MOUTH THREE TIMES DAILY AS NEEDED FOR  DIARRHEA, Disp: 90 tablet, Rfl: 2  •  ondansetron (ZOFRAN) 8 MG tablet,  mg Tab, Oral, TID, PRN as needed, 0 Refill(s), Disp: , Rfl:   •  oxybutynin XL (DITROPAN-XL) 10 MG 24 hr tablet, Take 1 tablet by mouth Daily., Disp: 90 tablet, Rfl: 3  •  pantoprazole (PROTONIX) 40 MG EC tablet, Take 40 mg by mouth., Disp: , Rfl:   •  temazepam (RESTORIL) 30 MG capsule, Take 30 mg by mouth every night at bedtime., Disp: , Rfl:   •  Xarelto 2.5 MG tablet, Take 2.5 mg by mouth 2 (Two) Times a Day., Disp: , Rfl:     Past Medical History:   Diagnosis Date   • Abdominal cramping    • Abdominal pain, epigastric    • Abdominal pain, lower    • Acute bronchitis    • Acute frontal sinusitis     History of    • Acute gastroenteritis    • Acute sinusitis    • Adrenal gland anomaly    • Allergic rhinitis    • Anxiety    • Arthritis    • Bloating    • Body aches    • Bowel incontinence    • Carpal tunnel syndrome    • Cervical spine disease    • Circadian rhythm sleep disorder    • Cough    • Decreased appetite    • Depression    • Diarrhea    • Difficulty walking    • Dysphasia    • Elevated amylase    • Esophageal spasm    • Fatigue    • Former smoker    • JANEY (generalized anxiety disorder)    • GERD without esophagitis    • Headache    • High risk medication use    • History of colon polyps    • Hyperactivity of bladder    • Hypertension    • IBS (irritable bowel syndrome)    • Insomnia    • Irritable bowel syndrome with diarrhea    • Nausea    • Non-ulcer dyspepsia    • Overactive bladder    • Pancreatic disorder    • Persistent insomnia    • Pill dysphagia    • Primary insomnia    • Raynaud's phenomenon    • Rectal itching    • Rectal sphincter incontinence    • Shingles    • Vision loss    • Vitamin B deficiency    • Weight loss        Past Surgical History:   Procedure Laterality Date   • CARPAL TUNNEL RELEASE Right 9/8/2021    Procedure: RIGHT CARPAL TUNNEL RELEASE, WITH SYNOVECTOMY, RIGHT VOLAR WRIST EXCISCION OF VOLAR GANGLION  CYST;  Surgeon: Miranda Luke MD;  Location: [facility];  Service: Plastics;  Laterality: Right;   • CERVICAL DISCECTOMY ANTERIOR     • CHOLECYSTECTOMY     • COLONOSCOPY  2015    13 polyps removed   • NECK SURGERY     • TOOTH EXTRACTION     • TUBAL ABDOMINAL LIGATION     • UPPER GASTROINTESTINAL ENDOSCOPY      [facility]  - normal per patient   • WRIST GANGLION EXCISION Right 2021    Procedure: RIGHT WRIST VOLAR GANGLION EXCISION;  Surgeon: Miranda Luke MD;  Location: [facility];  Service: Plastics;  Laterality: Right;       Family History   Problem Relation Age of Onset   • Arthritis Mother    • Aneurysm Mother    • Heart disease Mother    • Hypertension Mother    • Seizures Mother    • Stroke Mother    • Arthritis Father    • Heart disease Father    • Hypertension Father    • No Known Problems Other    • Arthritis Maternal Grandmother    • Cancer Maternal Grandmother    • Heart disease Maternal Grandmother    • Hypertension Maternal Grandmother    • No Known Problems Maternal Grandfather    • No Known Problems Paternal Grandmother    • No Known Problems Paternal Grandfather    • Ovarian cancer Maternal Aunt    • Breast cancer Neg Hx        Social History     Tobacco Use   • Smoking status: Former Smoker     Packs/day: 1.00     Years: 30.00     Pack years: 30.00     Types: Cigarettes     Quit date: 2014     Years since quittin.7   • Smokeless tobacco: Never Used   Substance Use Topics   • Alcohol use: Not Currently     Comment: social alcohol use//cafeine 2 cups of coffee 1 glass of tea daily             Objective     Vitals:    06/15/22 1140   BP: 110/80   Pulse: 82   Temp: 98.1 °F (36.7 °C)   SpO2: 99%     Body mass index is 17.71 kg/m².    Physical Exam  Vitals reviewed.   Constitutional:       Appearance: She is well-developed. She is not diaphoretic.   HENT:      Head: Normocephalic and atraumatic.   Eyes:      General: No scleral icterus.     Pupils: Pupils  are equal, round, and reactive to light.   Neck:      Thyroid: No thyromegaly.   Cardiovascular:      Rate and Rhythm: Normal rate and regular rhythm.      Heart sounds: No murmur heard.    No friction rub. No gallop.   Pulmonary:      Effort: Pulmonary effort is normal. No respiratory distress.      Breath sounds: No wheezing or rales.   Chest:      Chest wall: No tenderness.   Abdominal:      General: Bowel sounds are normal. There is no distension.      Palpations: Abdomen is soft.      Tenderness: There is no abdominal tenderness.   Musculoskeletal:         General: No deformity. Normal range of motion.   Lymphadenopathy:      Cervical: No cervical adenopathy.   Skin:     General: Skin is warm and dry.      Findings: No rash.   Neurological:      Cranial Nerves: No cranial nerve deficit.      Motor: No abnormal muscle tone.         Lab Results   Component Value Date    GLUCOSE 93 05/19/2022    BUN 14 05/19/2022    CREATININE 0.88 05/19/2022    EGFRIFNONA 70 10/19/2021    EGFRIFAFRI 80 10/19/2021    BCR 16 05/19/2022    K 4.7 05/19/2022    CO2 26 05/19/2022    CALCIUM 9.8 05/19/2022    PROTENTOTREF 6.9 05/19/2022    ALBUMIN 4.7 05/19/2022    LABIL2 2.1 05/19/2022    AST 20 05/19/2022    ALT 13 05/19/2022       WBC   Date Value Ref Range Status   06/09/2022 11.81 (H) 4.5 - 11.0 10*3/uL Final     RBC   Date Value Ref Range Status   06/09/2022 4.43 4.0 - 5.2 10*6/uL Final     Hemoglobin   Date Value Ref Range Status   06/09/2022 12.8 12.0 - 16.0 g/dL Final     Hematocrit   Date Value Ref Range Status   06/09/2022 39.8 36.0 - 46.0 % Final     MCV   Date Value Ref Range Status   06/09/2022 89.8 80.0 - 100.0 fL Final     MCH   Date Value Ref Range Status   06/09/2022 28.9 26.0 - 34.0 pg Final     MCHC   Date Value Ref Range Status   06/09/2022 32.2 31.0 - 37.0 g/dL Final     RDW   Date Value Ref Range Status   06/09/2022 13.0 12.0 - 16.8 % Final     MPV   Date Value Ref Range Status   06/09/2022 11.7 8.4 - 12.4 fL  Final     Platelets   Date Value Ref Range Status   06/09/2022 231 140 - 440 10*3/uL Final     Neutrophil Rel %   Date Value Ref Range Status   06/09/2022 76.2 45 - 80 % Final     Lymphocyte Rel %   Date Value Ref Range Status   06/09/2022 14.4 (L) 15 - 50 % Final     Monocyte Rel %   Date Value Ref Range Status   06/09/2022 7.8 0 - 15 % Final     Eosinophil %   Date Value Ref Range Status   06/09/2022 0.7 0 - 7 % Final     Basophil Rel %   Date Value Ref Range Status   06/09/2022 0.3 0 - 2 % Final     Immature Grans %   Date Value Ref Range Status   06/09/2022 0.6 0.0 - 1.0 % Final     Neutrophils Absolute   Date Value Ref Range Status   06/09/2022 9.00 (H) 2.0 - 8.8 10*3/uL Final     Lymphocytes Absolute   Date Value Ref Range Status   06/09/2022 1.70 0.7 - 5.5 10*3/uL Final     Monocytes Absolute   Date Value Ref Range Status   06/09/2022 0.92 0.0 - 1.7 10*3/uL Final     Eosinophils Absolute   Date Value Ref Range Status   06/09/2022 0.08 0.0 - 0.8 10*3/uL Final     Basophils Absolute   Date Value Ref Range Status   06/09/2022 0.04 0.0 - 0.2 10*3/uL Final     Immature Grans, Absolute   Date Value Ref Range Status   06/09/2022 0.07 0.00 - 0.10 10*3/uL Final     nRBC   Date Value Ref Range Status   06/09/2022 0 0 /100(WBC) Final       Lab Results   Component Value Date    HGBA1C 6.0 (H) 02/08/2020       Lab Results   Component Value Date    VSIGNYEG68 550 05/19/2022       TSH   Date Value Ref Range Status   05/19/2022 0.952 0.450 - 4.500 uIU/mL Final       No results found for: CHOL  Lab Results   Component Value Date    TRIG 85 05/19/2022     Lab Results   Component Value Date    HDL 65 05/19/2022     Lab Results   Component Value Date    LDL 84 05/19/2022     Lab Results   Component Value Date    VLDL 16 05/19/2022     No results found for: LDLHDL      Procedures    Assessment & Plan   Problems Addressed this Visit     Adrenal nodule (HCC)    Relevant Orders    Ambulatory Referral to Endocrinology    Chronic  fatigue - Primary    Post-COVID syndrome      Diagnoses       Codes Comments    Chronic fatigue    -  Primary ICD-10-CM: R53.82  ICD-9-CM: 780.79     Adrenal nodule (HCC)     ICD-10-CM: E27.8  ICD-9-CM: 255.8     Post-COVID syndrome     ICD-10-CM: U09.9  ICD-9-CM: 139.8       Post covid fatigue.  Uncontrolled.  New problem.  Off work 6/2-8/15/22.     Adrenal nodule.  To endo.  New problem.   Unintentional wt loss.  CT as above from 11/21. With PVD that was extensive.  CBC normal with WBC 11.81.  CMP unrevealing from 6/9/21.  EGD/C scope reviewed with esophageal dilation.   Up 3 lbs from 5/22.        Orders Placed This Encounter   Procedures   • Ambulatory Referral to Endocrinology     Referral Priority:   Routine     Referral Type:   Consultation     Referral Reason:   Specialty Services Required     Requested Specialty:   Endocrinology     Number of Visits Requested:   1       Current Outpatient Medications   Medication Sig Dispense Refill   • aspirin 81 MG EC tablet Take 81 mg by mouth Daily.     • Cholecalciferol (Vitamin D3) 1.25 MG (02307 UT) capsule Take 1 capsule by mouth Every 7 (Seven) Days. 12 capsule 3   • clonazePAM (KlonoPIN) 2 MG tablet TK 1/2 T PO QID PRN     • clopidogrel (PLAVIX) 75 MG tablet Take 75 mg by mouth Daily.     • colesevelam (WELCHOL) 625 MG tablet TAKE 3 TABLETS BY MOUTH EVERY MORNING TO PREVENT DIARRHEA     • desvenlafaxine (PRISTIQ) 50 MG 24 hr tablet Take 50 mg by mouth Daily.     • diphenoxylate-atropine (LOMOTIL) 2.5-0.025 MG per tablet TAKE 1 TABLET BY MOUTH THREE TIMES DAILY AS NEEDED FOR DIARRHEA 90 tablet 2   • ondansetron (ZOFRAN) 8 MG tablet   mg Tab, Oral, TID, PRN as needed, 0 Refill(s)     • oxybutynin XL (DITROPAN-XL) 10 MG 24 hr tablet Take 1 tablet by mouth Daily. 90 tablet 3   • pantoprazole (PROTONIX) 40 MG EC tablet Take 40 mg by mouth.     • temazepam (RESTORIL) 30 MG capsule Take 30 mg by mouth every night at bedtime.     • Xarelto 2.5 MG tablet Take 2.5 mg by  mouth 2 (Two) Times a Day.       No current facility-administered medications for this visit.       Esthelawaldemarjeevan Ruthie had no medications administered during this visit.    Return in about 8 weeks (around 8/10/2022).    There are no Patient Instructions on file for this visit.

## 2022-06-28 PROBLEM — G93.9 BRAIN LESION: Status: RESOLVED | Noted: 2020-02-12 | Resolved: 2022-06-28

## 2022-06-28 PROBLEM — T30.0 BURN: Status: RESOLVED | Noted: 2021-06-07 | Resolved: 2022-06-28

## 2022-06-28 PROBLEM — Z78.0 POSTMENOPAUSE: Status: RESOLVED | Noted: 2022-05-19 | Resolved: 2022-06-28

## 2022-06-28 PROBLEM — J06.9 UPPER RESPIRATORY INFECTION: Status: RESOLVED | Noted: 2022-01-12 | Resolved: 2022-06-28

## 2022-06-28 PROBLEM — N89.8 VAGINAL DISCHARGE: Status: RESOLVED | Noted: 2022-04-14 | Resolved: 2022-06-28

## 2022-06-28 PROBLEM — I10 ESSENTIAL HYPERTENSION: Status: RESOLVED | Noted: 2020-02-12 | Resolved: 2022-06-28

## 2022-06-28 PROBLEM — Z23 IMMUNIZATION DUE: Status: RESOLVED | Noted: 2022-05-19 | Resolved: 2022-06-28

## 2022-06-28 PROBLEM — R63.4 WEIGHT LOSS: Status: RESOLVED | Noted: 2021-10-19 | Resolved: 2022-06-28

## 2022-06-28 PROBLEM — Z12.4 PAP SMEAR FOR CERVICAL CANCER SCREENING: Status: RESOLVED | Noted: 2021-10-19 | Resolved: 2022-06-28

## 2022-06-28 PROBLEM — E27.8 ADRENAL MASS, LEFT: Status: ACTIVE | Noted: 2021-11-01

## 2022-06-28 PROBLEM — R50.9 FEVER: Status: RESOLVED | Noted: 2022-01-12 | Resolved: 2022-06-28

## 2022-06-28 PROBLEM — E27.8 ADRENAL NODULE (HCC): Status: RESOLVED | Noted: 2022-06-15 | Resolved: 2022-06-28

## 2022-06-28 NOTE — PROGRESS NOTES
Christie Hendricks, 61 y.o., Gender: female    Assessment/Plan    1.  Pt w/ finding of adrenal mass since 11/21 through this system and w/ further information from the pt figured out where prior imaging had been done and found reference to this from 2008 time frame and that over a 10 yr period through that system it had not changed.  As that is well past standard f/u period for an adrenal mass, do not feel the adrenal mass is of any clinical concern at this time and would purely be a historical footnote.  Nothing more planned related to this finding at this time.  Related to pt's other c/o, which do not seem to be related to finding adrenal mass, opted to do single set of fasting labs to exclude any adrenal dysfunction from the situation.  Pt's hx sounds like long COVID related problems of the GI track after having acute COVID infection later part of Dec '21.  Will be in contact w/ pt once have the labs to inform of the results.  If results are normal as expected nothing more to be done.  If happen to be abnormal will address.        Time Counseled: 30  Minutes  Total Time: 45  Minutes    Return to office in:  pending labs      HPI Summary    Pt is here for evaluation of adrenal mass.  This was noted 11/21 on imaging for other reasons.  Found mention of this from '08 from outside data after pt provided information as to where such outside imaging could be found.  Pt gives hx of being in her usual state w/ mxl chronic problems for some 30 yrs like baseline poor sleep, chronic fatigue, chronic diarrhea from IBS, and ongoing problems w/ both bowel and urinary incontinence.  Pt then was found to have sig PVD 11/21 and underwent stenting in 12/21 and has been on blood thinners so now has easy bruising which was not present previously.  Pt then had COVID late 12/21 which was sig illness for her in that took some time to get over the initial infection and from there she has not been the same since.  Pt reports sig wgt loss  of around 30 lbs since having COVID and is not gaining wgt back though is eating better now, weakness on top of prior fatigue, mental slowness more then previous to point she has recently been put off work for few mons time, positional dizziness, and mxl new gastric c/o which on eval found to have mild gastroparesis and just started tx as trial to see if can be improved.  Pt denies any palpitations or significant changes in blood pressure.  Pt denies headaches or vision issues.  Pt denies any CP, SOB, or edema.  Pt denies any muscle weakness or cramps.  Pt denies any tremor.  Pt denies excess thirst or urination.  Pt is without any other complaints currently.    Review of Systems  see HPI    Patient History    Past History (reviewed):    Medical:   Past Medical History:   Diagnosis Date   • Abdominal pain, epigastric    • Abdominal pain, lower    • Acute bronchitis    • Adrenal mass, left ([facility]) 2008    1.4 cm as of 11/21, outside records indicate present since '08 with little change over that time frame   • Anxiety    • Arthritis    • Carpal tunnel syndrome    • Cervical spine disease    • Circadian rhythm sleep disorder    • COVID-19 virus infection 12/28/2021   • Decreased appetite 01/2022    started with COVID infection late Dec '21   • Depression    • Difficulty walking    • Elevated amylase    • Esophageal spasm    • Former smoker     quit '12   • JANEY (generalized anxiety disorder)    • Gastroparesis 06/2022    mild grade 1 found at this time   • GERD without esophagitis    • Headache    • History of colon polyps    • Insomnia    • Irritable bowel syndrome with diarrhea 1990   • Long COVID 05/2022    pt has mxl c/o that fall under this syndrome   • Overactive bladder    • Pill dysphagia    • PVD (peripheral vascular disease) ([facility]) 11/2021    noted on imaging done for other reasons, s/p mxl stents   • Raynaud's phenomenon    • Rectal sphincter incontinence    • Shingles    • TIA (transient ischemic attack) 2019    • Unintentional weight loss 01/2022    started with COVID infection late Dec '21   • Vision loss    • Vitamin D deficiency        Surgery:   Past Surgical History:   Procedure Laterality Date   • ANGIOPLASTY / STENTING ILIAC Bilateral 12/2021    mxl stents from level of iliac into femoral arteries   • CARPAL TUNNEL RELEASE Right 09/08/2021    Procedure: RIGHT CARPAL TUNNEL RELEASE, WITH SYNOVECTOMY, RIGHT VOLAR WRIST EXCISCION OF VOLAR GANGLION CYST;  Surgeon: Miranda Luke MD;  Location: [facility];  Service: Plastics;  Laterality: Right;   • CERVICAL DISCECTOMY ANTERIOR     • CHOLECYSTECTOMY  2000   • COLONOSCOPY  12/2015    13 polyps removed   • NECK SURGERY     • TOOTH EXTRACTION     • TUBAL ABDOMINAL LIGATION     • UPPER GASTROINTESTINAL ENDOSCOPY  2005    [facility]  - normal per patient   • WRIST GANGLION EXCISION Right 09/08/2021    Procedure: RIGHT WRIST VOLAR GANGLION EXCISION;  Surgeon: Miranda Luke MD;  Location: [facility];  Service: Plastics;  Laterality: Right;          Social History (reviewed):  Smoking 0.5-1 ppd 20+ yrs quit '12 time frame, - ETOH, - Drugs,  but has been  from  since around '18 related to him having a head injury that changed his personality, Occupation pharm tech at [employer]    Medication List:  Current medications were reviewed.    Allergies:    Allergies   Allergen Reactions   • Penicillin G Rash and Swelling     swelling of tonguehives swelling and difficulty breathing as a child   • Penicillins Hives and Shortness Of Breath     unknown       Physical Exam    VITALS:    Vitals:    06/29/22 1251   BP: 118/72   Pulse: 73   Temp: 98.4 °F (36.9 °C)   SpO2: 99%     Body mass index is 17.96 kg/m².     GENERAL:  Looks stated age, Thin small stature  HEAD/EYES:  N/C, A/T, Mask in place  EXTREMITIES:  FROM  CNS:  A&Ox3    Full exam not done today.      Labs/Imaging  All available lab and imaging data were reviewed.      Alfonso  Pat TIJERINA, MARS, FACE, [facility]  6/29/2022  13:46 EDT

## 2022-06-29 ENCOUNTER — OFFICE VISIT (OUTPATIENT)
Dept: ENDOCRINOLOGY | Age: 62
End: 2022-06-29

## 2022-06-29 VITALS
BODY MASS INDEX: 17.96 KG/M2 | SYSTOLIC BLOOD PRESSURE: 118 MMHG | DIASTOLIC BLOOD PRESSURE: 72 MMHG | HEART RATE: 73 BPM | HEIGHT: 63 IN | WEIGHT: 101.4 LBS | OXYGEN SATURATION: 99 % | TEMPERATURE: 98.4 F

## 2022-06-29 DIAGNOSIS — R63.4 UNINTENTIONAL WEIGHT LOSS: ICD-10-CM

## 2022-06-29 DIAGNOSIS — E27.8 ADRENAL MASS, LEFT: Primary | ICD-10-CM

## 2022-06-29 PROCEDURE — 99204 OFFICE O/P NEW MOD 45 MIN: CPT | Performed by: INTERNAL MEDICINE

## 2022-06-29 RX ORDER — METOCLOPRAMIDE 10 MG/1
TABLET ORAL
COMMUNITY
Start: 2022-06-21 | End: 2023-01-25 | Stop reason: SDUPTHER

## 2022-06-29 NOTE — PATIENT INSTRUCTIONS
As discussed the adrenal mass dates back to 2008 time frame with little change over that time frame and is not of clinical concern at this time nothing more to do for this finding.    As discussed most of the other complaints you have fall under long COVID syndrome which is currently not well understood.  To exclude any hormone related issues for those complaints will do labs to ensure the adrenal hormones are normal as does not sound like testing was done in the past when the adrenal mass was found.  Do not expect there to be an issue based on what has been happening want to just ensure there is nothing unexpected.  Will be in touch once the labs are back as to the outcome.    Labs are to be done as AM fasting, nothing to eat after midnight, water only to take medications if needed.  Labs are to be done before 9 AM with 7-8 AM being the optimal time. No caffeine intake prior to the testing either.

## 2022-07-01 ENCOUNTER — LAB (OUTPATIENT)
Dept: LAB | Facility: HOSPITAL | Age: 62
End: 2022-07-01

## 2022-07-01 DIAGNOSIS — E27.8 ADRENAL MASS, LEFT: ICD-10-CM

## 2022-07-01 DIAGNOSIS — R63.4 UNINTENTIONAL WEIGHT LOSS: ICD-10-CM

## 2022-07-01 LAB
ANION GAP SERPL CALCULATED.3IONS-SCNC: 8.4 MMOL/L (ref 5–15)
BUN SERPL-MCNC: 13 MG/DL (ref 8–23)
BUN/CREAT SERPL: 16.3 (ref 7–25)
CALCIUM SPEC-SCNC: 9 MG/DL (ref 8.6–10.5)
CHLORIDE SERPL-SCNC: 103 MMOL/L (ref 98–107)
CO2 SERPL-SCNC: 30.6 MMOL/L (ref 22–29)
CORTIS SERPL-MCNC: 15 MCG/DL
CREAT SERPL-MCNC: 0.8 MG/DL (ref 0.57–1)
EGFRCR SERPLBLD CKD-EPI 2021: 83.9 ML/MIN/1.73
GLUCOSE SERPL-MCNC: 89 MG/DL (ref 65–99)
POTASSIUM SERPL-SCNC: 4.1 MMOL/L (ref 3.5–5.2)
SODIUM SERPL-SCNC: 142 MMOL/L (ref 136–145)
TSH SERPL DL<=0.05 MIU/L-ACNC: 1.06 UIU/ML (ref 0.27–4.2)

## 2022-07-01 PROCEDURE — 80048 BASIC METABOLIC PNL TOTAL CA: CPT

## 2022-07-01 PROCEDURE — 84443 ASSAY THYROID STIM HORMONE: CPT

## 2022-07-01 PROCEDURE — 83835 ASSAY OF METANEPHRINES: CPT

## 2022-07-01 PROCEDURE — 82024 ASSAY OF ACTH: CPT

## 2022-07-01 PROCEDURE — 82627 DEHYDROEPIANDROSTERONE: CPT

## 2022-07-01 PROCEDURE — 82533 TOTAL CORTISOL: CPT

## 2022-07-01 PROCEDURE — 36415 COLL VENOUS BLD VENIPUNCTURE: CPT

## 2022-07-02 LAB
ACTH PLAS-MCNC: 24 PG/ML (ref 7.2–63.3)
DHEA-S SERPL-MCNC: 22.1 UG/DL (ref 29.4–220.5)

## 2022-07-11 ENCOUNTER — TELEPHONE (OUTPATIENT)
Dept: FAMILY MEDICINE CLINIC | Facility: CLINIC | Age: 62
End: 2022-07-11

## 2022-07-11 NOTE — TELEPHONE ENCOUNTER
Caller: VICK HANSEN    Relationship:     Best call back number: [phone] DIRECT LINE TO VICK    What is the best time to reach you: ANYTIME DURING BUSINESS HOURS    Who are you requesting to speak with (clinical staff, provider,  specific staff member): CLINICAL STAFF    Do you know the name of the person who called:      What was the call regarding:  VICK CALLED TO ADVISE THAT SHE IS NEEDING TO HAVE PATIENT PAPERWORK FILLED OUT AGAIN, BUT THIS TIME IT NEEDS TO HAVE PATIENT GI ISSUES ON IT AND NOT COVID ISSUES. VICK IS NEEDING THIS INFORMATION SO THAT PATIENT CANBE PAID FOR 3 MONTHS AND NOT 10 DAYS FOR COVID. VICK STATES THAT THIS PAPERWORK NEEDS TO BE COMPLETED AND RETURNED TODAY, SO THAT SHE WONT HAVE TO DENY PATIENT'S CLAIM.    Do you require a callback: YES        THANKS

## 2022-07-11 NOTE — TELEPHONE ENCOUNTER
Patient called and states that [facility] called and they are missing some information on her paperwork.  She states she sent a my chart message to Dr Matos this morning and he said that the paperwork would be taken care of today by [facility].  They still haven't heard anything and will be making there decision today.  Her phone number is [phone].

## 2022-07-11 NOTE — TELEPHONE ENCOUNTER
Just faxed paperwork over, tried calling Jacquelin unable to reach [facility].

## 2022-07-12 ENCOUNTER — TELEPHONE (OUTPATIENT)
Dept: ENDOCRINOLOGY | Age: 62
End: 2022-07-12

## 2022-07-12 LAB
METANEPH FREE SERPL-MCNC: 21.7 PG/ML (ref 0–88)
NORMETANEPHRINE SERPL-MCNC: 39.8 PG/ML (ref 0–285.2)

## 2022-07-12 NOTE — TELEPHONE ENCOUNTER
7/12 called and sw pt reg her labs    ----- Message from Christie Hendricks sent at 7/11/2022  8:54 AM EDT -----  Regarding: Lab Results   Dr. Stewart, I would like to talk to you about my Lab Results. Thank you Milka Hendricks [phone]

## 2022-08-10 ENCOUNTER — OFFICE VISIT (OUTPATIENT)
Dept: FAMILY MEDICINE CLINIC | Facility: CLINIC | Age: 62
End: 2022-08-10

## 2022-08-10 VITALS
OXYGEN SATURATION: 98 % | HEART RATE: 84 BPM | DIASTOLIC BLOOD PRESSURE: 68 MMHG | BODY MASS INDEX: 19 KG/M2 | HEIGHT: 63 IN | WEIGHT: 107.2 LBS | RESPIRATION RATE: 16 BRPM | SYSTOLIC BLOOD PRESSURE: 106 MMHG | TEMPERATURE: 98.2 F

## 2022-08-10 DIAGNOSIS — R53.82 CHRONIC FATIGUE: ICD-10-CM

## 2022-08-10 DIAGNOSIS — K21.9 GERD WITHOUT ESOPHAGITIS: ICD-10-CM

## 2022-08-10 DIAGNOSIS — U09.9 POST-COVID SYNDROME: ICD-10-CM

## 2022-08-10 DIAGNOSIS — R63.4 UNINTENTIONAL WEIGHT LOSS: Primary | ICD-10-CM

## 2022-08-10 DIAGNOSIS — N31.8 HYPERACTIVITY OF BLADDER: ICD-10-CM

## 2022-08-10 DIAGNOSIS — I77.1 ILIAC ARTERY STENOSIS, BILATERAL: ICD-10-CM

## 2022-08-10 PROCEDURE — 99214 OFFICE O/P EST MOD 30 MIN: CPT | Performed by: FAMILY MEDICINE

## 2022-08-10 NOTE — PROGRESS NOTES
FU chronic fatigue/unintentional wt loss.      Subjective   Christie Hendricks is a 61 y.o. female.     History of Present Illness   F/U chronic fatigue with unintentional wt loss.  Up 6 lbs from 6/29/22.  Still with lack of endurance where she can do things around the house for 3 hours and then she is fatigued again.    CT scan with high grade stenosis L common iliac and high grade stenosis R and L external iliac arteries.  Seeing vascular and GI.  W/u otherwise normal except WBC of 11.81 2 months ago.    F/U L adrenal nodule   W/u by Dr Stewart was unrevealing.  And no change over 10 year period on imaging per Dr Stewart's note.   OAB.  Controlled with oxybutynin.  Needs refill.       The following portions of the patient's history were reviewed and updated as appropriate: allergies, current medications, past family history, past medical history, past social history, past surgical history and problem list.    Review of Systems   Constitutional: Positive for fatigue. Negative for appetite change.   HENT: Negative for nosebleeds and sore throat.    Eyes: Negative for blurred vision and visual disturbance.   Respiratory: Negative for shortness of breath and wheezing.    Cardiovascular: Negative for chest pain and leg swelling.   Gastrointestinal: Negative for abdominal distention and abdominal pain.   Endocrine: Negative for cold intolerance and polyuria.   Genitourinary: Negative for dysuria and hematuria.   Musculoskeletal: Negative for arthralgias and myalgias.   Skin: Negative for color change and rash.   Neurological: Negative for weakness and confusion.   Psychiatric/Behavioral: Negative for agitation and depressed mood.       Patient Active Problem List   Diagnosis   • Rectal sphincter incontinence   • Raynaud's phenomenon   • Pill dysphagia   • Pancreatic disorder   • Irritable bowel syndrome with diarrhea   • Insomnia   • Hyperactivity of bladder   • GERD without esophagitis   • JANEY (generalized anxiety  disorder)   • Chronic fatigue   • Esophageal spasm   • Depression   • Circadian rhythm sleep disorder   • Carpal tunnel syndrome   • Anxiety   • Right arm numbness   • Deltoid tendonitis, right   • Acute bronchitis due to other specified organisms   • Mild mental slowing   • Rotator cuff tendonitis, right   • TIA (transient ischemic attack)   • Colon polyps   • Iliac artery stenosis, bilateral (HCC)   • Lung nodules   • Nausea   • Vitamin D deficiency   • Post-COVID syndrome   • Unintentional weight loss       Allergies   Allergen Reactions   • Penicillin G Rash and Swelling     swelling of tonguehives swelling and difficulty breathing as a child   • Penicillins Hives and Shortness Of Breath     unknown         Current Outpatient Medications:   •  aspirin 81 MG EC tablet, Take 81 mg by mouth Daily., Disp: , Rfl:   •  Cholecalciferol (Vitamin D3) 1.25 MG (50169 UT) capsule, Take 1 capsule by mouth Every 7 (Seven) Days., Disp: 12 capsule, Rfl: 3  •  clonazePAM (KlonoPIN) 2 MG tablet, TK 1/2 T PO QID PRN, Disp: , Rfl:   •  clopidogrel (PLAVIX) 75 MG tablet, Take 75 mg by mouth Daily., Disp: , Rfl:   •  colesevelam (WELCHOL) 625 MG tablet, TAKE 3 TABLETS BY MOUTH EVERY MORNING TO PREVENT DIARRHEA, Disp: , Rfl:   •  desvenlafaxine (PRISTIQ) 50 MG 24 hr tablet, Take 50 mg by mouth Daily., Disp: , Rfl:   •  diphenoxylate-atropine (LOMOTIL) 2.5-0.025 MG per tablet, TAKE 1 TABLET BY MOUTH THREE TIMES DAILY AS NEEDED FOR DIARRHEA, Disp: 90 tablet, Rfl: 2  •  metoclopramide (REGLAN) 10 MG tablet, , Disp: , Rfl:   •  ondansetron (ZOFRAN) 8 MG tablet,  mg Tab, Oral, TID, PRN as needed, 0 Refill(s), Disp: , Rfl:   •  oxybutynin XL (DITROPAN-XL) 10 MG 24 hr tablet, Take 1 tablet by mouth Daily., Disp: 90 tablet, Rfl: 3  •  pantoprazole (PROTONIX) 40 MG EC tablet, Take 40 mg by mouth., Disp: , Rfl:   •  temazepam (RESTORIL) 30 MG capsule, Take 30 mg by mouth every night at bedtime., Disp: , Rfl:   •  Xarelto 2.5 MG tablet, Take 2.5  mg by mouth 2 (Two) Times a Day., Disp: , Rfl:     Past Medical History:   Diagnosis Date   • Abdominal pain, epigastric    • Abdominal pain, lower    • Acute bronchitis    • Adrenal mass, left (HCC) 2008    1.4 cm as of 11/21, outside records indicate present since '08 with little change over that time frame   • Anxiety    • Arthritis    • Carpal tunnel syndrome    • Cervical spine disease    • Circadian rhythm sleep disorder    • COVID-19 virus infection 12/28/2021   • Decreased appetite 01/2022    started with COVID infection late Dec '21   • Depression    • Difficulty walking    • Elevated amylase    • Esophageal spasm    • Former smoker     quit '12   • JANEY (generalized anxiety disorder)    • Gastroparesis 06/2022    mild grade 1 found at this time   • GERD without esophagitis    • Headache    • History of colon polyps    • Insomnia    • Irritable bowel syndrome with diarrhea 1990   • Long COVID 05/2022    pt has mxl c/o that fall under this syndrome   • Overactive bladder    • Pill dysphagia    • PVD (peripheral vascular disease) ([facility]) 11/2021    noted on imaging done for other reasons, s/p mxl stents   • Raynaud's phenomenon    • Rectal sphincter incontinence    • Shingles    • TIA (transient ischemic attack) 2019   • Unintentional weight loss 01/2022    started with COVID infection late Dec '21   • Vision loss    • Vitamin D deficiency        Past Surgical History:   Procedure Laterality Date   • ANGIOPLASTY / STENTING ILIAC Bilateral 12/2021    mxl stents from level of iliac into femoral arteries   • CARPAL TUNNEL RELEASE Right 09/08/2021    Procedure: RIGHT CARPAL TUNNEL RELEASE, WITH SYNOVECTOMY, RIGHT VOLAR WRIST EXCISCION OF VOLAR GANGLION CYST;  Surgeon: Miranda Luke MD;  [place] [facility] [place]  Service: Plastics;  Laterality: Right;   • CERVICAL DISCECTOMY ANTERIOR     • CHOLECYSTECTOMY  2000   • COLONOSCOPY  12/2015    13 polyps removed   • NECK SURGERY     • TOOTH EXTRACTION     • TUBAL  ABDOMINAL LIGATION     • UPPER GASTROINTESTINAL ENDOSCOPY      [facility]  - normal per patient   • WRIST GANGLION EXCISION Right 2021    Procedure: RIGHT WRIST VOLAR GANGLION EXCISION;  Surgeon: Miranda Luke MD;  Location: [facility] [place] [place];  Service: Plastics;  Laterality: Right;       Family History   Problem Relation Age of Onset   • Arthritis Mother    • Aneurysm Mother    • Heart disease Mother    • Hypertension Mother    • Seizures Mother    • Stroke Mother    • Arthritis Father    • Heart disease Father    • Hypertension Father    • No Known Problems Other    • Arthritis Maternal Grandmother    • Cancer Maternal Grandmother    • Heart disease Maternal Grandmother    • Hypertension Maternal Grandmother    • No Known Problems Maternal Grandfather    • No Known Problems Paternal Grandmother    • No Known Problems Paternal Grandfather    • Ovarian cancer Maternal Aunt    • Breast cancer Neg Hx        Social History     Tobacco Use   • Smoking status: Former Smoker     Packs/day: 1.00     Years: 30.00     Pack years: 30.00     Types: Cigarettes     Quit date: 2014     Years since quittin.8   • Smokeless tobacco: Never Used   Substance Use Topics   • Alcohol use: Not Currently     Comment: social alcohol use//cafeine 2 cups of coffee 1 glass of tea daily             Objective     Vitals:    08/10/22 0746   BP: 106/68   Pulse: 84   Resp: 16   Temp: 98.2 °F (36.8 °C)   SpO2: 98%     Body mass index is 18.99 kg/m².    Physical Exam  Vitals reviewed.   Constitutional:       Appearance: She is well-developed. She is not diaphoretic.   HENT:      Head: Normocephalic and atraumatic.   Eyes:      General: No scleral icterus.     Pupils: Pupils are equal, round, and reactive to light.   Neck:      Thyroid: No thyromegaly.   Cardiovascular:      Rate and Rhythm: Normal rate and regular rhythm.      Heart sounds: No murmur heard.    No friction rub. No gallop.   Pulmonary:      Effort:  Pulmonary effort is normal. No respiratory distress.      Breath sounds: No wheezing or rales.   Chest:      Chest wall: No tenderness.   Abdominal:      General: Bowel sounds are normal. There is no distension.      Palpations: Abdomen is soft.      Tenderness: There is no abdominal tenderness.   Musculoskeletal:         General: No deformity. Normal range of motion.   Lymphadenopathy:      Cervical: No cervical adenopathy.   Skin:     General: Skin is warm and dry.      Findings: No rash.   Neurological:      Cranial Nerves: No cranial nerve deficit.      Motor: No abnormal muscle tone.         Lab Results   Component Value Date    GLUCOSE 89 07/01/2022    BUN 13 07/01/2022    CREATININE 0.80 07/01/2022    EGFRIFNONA 70 10/19/2021    EGFRIFAFRI 80 10/19/2021    BCR 16.3 07/01/2022    K 4.1 07/01/2022    CO2 30.6 (H) 07/01/2022    CALCIUM 9.0 07/01/2022    PROTENTOTREF 6.9 05/19/2022    ALBUMIN 4.7 05/19/2022    LABIL2 2.1 05/19/2022    AST 20 05/19/2022    ALT 13 05/19/2022       WBC   Date Value Ref Range Status   06/09/2022 11.81 (H) 4.5 - 11.0 10*3/uL Final     RBC   Date Value Ref Range Status   06/09/2022 4.43 4.0 - 5.2 10*6/uL Final     Hemoglobin   Date Value Ref Range Status   06/09/2022 12.8 12.0 - 16.0 g/dL Final     Hematocrit   Date Value Ref Range Status   06/09/2022 39.8 36.0 - 46.0 % Final     MCV   Date Value Ref Range Status   06/09/2022 89.8 80.0 - 100.0 fL Final     MCH   Date Value Ref Range Status   06/09/2022 28.9 26.0 - 34.0 pg Final     MCHC   Date Value Ref Range Status   06/09/2022 32.2 31.0 - 37.0 g/dL Final     RDW   Date Value Ref Range Status   06/09/2022 13.0 12.0 - 16.8 % Final     MPV   Date Value Ref Range Status   06/09/2022 11.7 8.4 - 12.4 fL Final     Platelets   Date Value Ref Range Status   06/09/2022 231 140 - 440 10*3/uL Final     Neutrophil Rel %   Date Value Ref Range Status   06/09/2022 76.2 45 - 80 % Final     Lymphocyte Rel %   Date Value Ref Range Status   06/09/2022  14.4 (L) 15 - 50 % Final     Monocyte Rel %   Date Value Ref Range Status   06/09/2022 7.8 0 - 15 % Final     Eosinophil %   Date Value Ref Range Status   06/09/2022 0.7 0 - 7 % Final     Basophil Rel %   Date Value Ref Range Status   06/09/2022 0.3 0 - 2 % Final     Immature Grans %   Date Value Ref Range Status   06/09/2022 0.6 0.0 - 1.0 % Final     Neutrophils Absolute   Date Value Ref Range Status   06/09/2022 9.00 (H) 2.0 - 8.8 10*3/uL Final     Lymphocytes Absolute   Date Value Ref Range Status   06/09/2022 1.70 0.7 - 5.5 10*3/uL Final     Monocytes Absolute   Date Value Ref Range Status   06/09/2022 0.92 0.0 - 1.7 10*3/uL Final     Eosinophils Absolute   Date Value Ref Range Status   06/09/2022 0.08 0.0 - 0.8 10*3/uL Final     Basophils Absolute   Date Value Ref Range Status   06/09/2022 0.04 0.0 - 0.2 10*3/uL Final     Immature Grans, Absolute   Date Value Ref Range Status   06/09/2022 0.07 0.00 - 0.10 10*3/uL Final     nRBC   Date Value Ref Range Status   06/09/2022 0 0 /100(WBC) Final       Lab Results   Component Value Date    HGBA1C 6.0 (H) 02/08/2020       Lab Results   Component Value Date    JDQSAQBG35 550 05/19/2022       TSH   Date Value Ref Range Status   07/01/2022 1.060 0.270 - 4.200 uIU/mL Final       No results found for: CHOL  Lab Results   Component Value Date    TRIG 85 05/19/2022     Lab Results   Component Value Date    HDL 65 05/19/2022     Lab Results   Component Value Date    LDL 84 05/19/2022     Lab Results   Component Value Date    VLDL 16 05/19/2022     No results found for: LDLHDL      Procedures    Assessment & Plan   Problems Addressed this Visit     Chronic fatigue    GERD without esophagitis    Hyperactivity of bladder    Iliac artery stenosis, bilateral (HCC)    Post-COVID syndrome    Unintentional weight loss - Primary      Diagnoses       Codes Comments    Unintentional weight loss    -  Primary ICD-10-CM: R63.4  ICD-9-CM: 783.21     Chronic fatigue     ICD-10-CM:  R53.82  ICD-9-CM: 780.79     Post-COVID syndrome     ICD-10-CM: U09.9  ICD-9-CM: 139.8     GERD without esophagitis     ICD-10-CM: K21.9  ICD-9-CM: 530.81     Iliac artery stenosis, bilateral (HCC)     ICD-10-CM: I77.1  ICD-9-CM: 447.1     Hyperactivity of bladder     ICD-10-CM: N31.8  ICD-9-CM: 596.51       Unintentional wt loss.  Improved.  Continue GI follow up.  Continue current diet.  Will recheck wt in 3 months.    Chronic fatigue with post covid syndrome.  Encouraged to get back to work on 8/15/22.  May help with fatigue with regular schedule.   Iliac stenosis.  Continue asa and plavix and xarelto per vascular.  OAB.  RF oxybutynin.  Controlled, chronic.           No orders of the defined types were placed in this encounter.      Current Outpatient Medications   Medication Sig Dispense Refill   • aspirin 81 MG EC tablet Take 81 mg by mouth Daily.     • Cholecalciferol (Vitamin D3) 1.25 MG (93717 UT) capsule Take 1 capsule by mouth Every 7 (Seven) Days. 12 capsule 3   • clonazePAM (KlonoPIN) 2 MG tablet TK 1/2 T PO QID PRN     • clopidogrel (PLAVIX) 75 MG tablet Take 75 mg by mouth Daily.     • colesevelam (WELCHOL) 625 MG tablet TAKE 3 TABLETS BY MOUTH EVERY MORNING TO PREVENT DIARRHEA     • desvenlafaxine (PRISTIQ) 50 MG 24 hr tablet Take 50 mg by mouth Daily.     • diphenoxylate-atropine (LOMOTIL) 2.5-0.025 MG per tablet TAKE 1 TABLET BY MOUTH THREE TIMES DAILY AS NEEDED FOR DIARRHEA 90 tablet 2   • metoclopramide (REGLAN) 10 MG tablet      • ondansetron (ZOFRAN) 8 MG tablet   mg Tab, Oral, TID, PRN as needed, 0 Refill(s)     • oxybutynin XL (DITROPAN-XL) 10 MG 24 hr tablet Take 1 tablet by mouth Daily. 90 tablet 3   • pantoprazole (PROTONIX) 40 MG EC tablet Take 40 mg by mouth.     • temazepam (RESTORIL) 30 MG capsule Take 30 mg by mouth every night at bedtime.     • Xarelto 2.5 MG tablet Take 2.5 mg by mouth 2 (Two) Times a Day.       No current facility-administered medications for this visit.        Christie Hendricks had no medications administered during this visit.    No follow-ups on file.    There are no Patient Instructions on file for this visit.

## 2022-09-01 RX ORDER — ONDANSETRON HYDROCHLORIDE 8 MG/1
8 TABLET, FILM COATED ORAL EVERY 8 HOURS PRN
Qty: 30 TABLET | Refills: 3 | Status: SHIPPED | OUTPATIENT
Start: 2022-09-01 | End: 2023-01-25 | Stop reason: SDUPTHER

## 2022-12-16 RX ORDER — SULFAMETHOXAZOLE AND TRIMETHOPRIM 800; 160 MG/1; MG/1
1 TABLET ORAL 2 TIMES DAILY
Qty: 6 TABLET | Refills: 0 | Status: SHIPPED | OUTPATIENT
Start: 2022-12-16 | End: 2023-01-25

## 2023-01-25 ENCOUNTER — OFFICE VISIT (OUTPATIENT)
Dept: FAMILY MEDICINE CLINIC | Facility: CLINIC | Age: 63
End: 2023-01-25
Payer: COMMERCIAL

## 2023-01-25 VITALS
DIASTOLIC BLOOD PRESSURE: 80 MMHG | HEIGHT: 63 IN | HEART RATE: 84 BPM | BODY MASS INDEX: 17.33 KG/M2 | TEMPERATURE: 98.6 F | OXYGEN SATURATION: 97 % | SYSTOLIC BLOOD PRESSURE: 110 MMHG | WEIGHT: 97.8 LBS

## 2023-01-25 DIAGNOSIS — Z11.59 ENCOUNTER FOR HEPATITIS C SCREENING TEST FOR LOW RISK PATIENT: ICD-10-CM

## 2023-01-25 DIAGNOSIS — R63.4 UNINTENTIONAL WEIGHT LOSS: Primary | ICD-10-CM

## 2023-01-25 PROCEDURE — 99214 OFFICE O/P EST MOD 30 MIN: CPT | Performed by: FAMILY MEDICINE

## 2023-01-25 RX ORDER — ACETAMINOPHEN AND CODEINE PHOSPHATE 300; 30 MG/1; MG/1
1 TABLET ORAL 4 TIMES DAILY PRN
COMMUNITY
Start: 2022-11-15

## 2023-01-25 RX ORDER — PROMETHAZINE HYDROCHLORIDE 25 MG/1
25 TABLET ORAL
COMMUNITY
Start: 2022-12-29

## 2023-01-25 RX ORDER — BETHANECHOL CHLORIDE 10 MG/1
10 TABLET ORAL 3 TIMES DAILY
COMMUNITY
Start: 2022-09-22 | End: 2023-09-22

## 2023-01-25 RX ORDER — ONDANSETRON 8 MG/1
8 TABLET, ORALLY DISINTEGRATING ORAL
COMMUNITY
Start: 2022-10-07

## 2023-01-25 RX ORDER — OXYCODONE HYDROCHLORIDE AND ACETAMINOPHEN 5; 325 MG/1; MG/1
1 TABLET ORAL EVERY 6 HOURS PRN
COMMUNITY
Start: 2022-12-20

## 2023-01-25 NOTE — PROGRESS NOTES
Chief Complaint   Patient presents with   • Weight Loss       Subjective   Christie Hendricks is a 62 y.o. female.     History of Present Illness   F/U unintentional wt loss.  Down 10 lb from 8/22.  No diarrhea now with wellchol with 3 in am.  I get nauseated in AM.  Seeing GI.    F/U L adrenal nodule.  Noted 1.4 cm on 11/22.    The following portions of the patient's history were reviewed and updated as appropriate: allergies, current medications, past family history, past medical history, past social history, past surgical history and problem list.    Review of Systems   Constitutional: Positive for unexpected weight loss. Negative for appetite change and fatigue.   HENT: Negative for nosebleeds and sore throat.    Eyes: Negative for blurred vision and visual disturbance.   Respiratory: Negative for shortness of breath and wheezing.    Cardiovascular: Negative for chest pain and leg swelling.   Gastrointestinal: Negative for abdominal distention and abdominal pain.   Endocrine: Negative for cold intolerance and polyuria.   Genitourinary: Negative for dysuria and hematuria.   Musculoskeletal: Negative for arthralgias and myalgias.   Skin: Negative for color change and rash.   Neurological: Negative for weakness and confusion.   Psychiatric/Behavioral: Negative for agitation and depressed mood.       Patient Active Problem List   Diagnosis   • Rectal sphincter incontinence   • Raynaud's phenomenon   • Pill dysphagia   • Pancreatic disorder   • Irritable bowel syndrome with diarrhea   • Insomnia   • Hyperactivity of bladder   • GERD without esophagitis   • JANEY (generalized anxiety disorder)   • Chronic fatigue   • Esophageal spasm   • Depression   • Circadian rhythm sleep disorder   • Carpal tunnel syndrome   • Anxiety   • Right arm numbness   • Deltoid tendonitis, right   • Acute bronchitis due to other specified organisms   • Mild mental slowing   • Rotator cuff tendonitis, right   • TIA (transient ischemic attack)    • Colon polyps   • Iliac artery stenosis, bilateral (HCC)   • Lung nodules   • Nausea   • Vitamin D deficiency   • Post-COVID syndrome   • Unintentional weight loss   • Encounter for hepatitis C screening test for low risk patient       Allergies   Allergen Reactions   • Penicillin G Rash and Swelling     swelling of tonguehives swelling and difficulty breathing as a child   • Penicillins Hives and Shortness Of Breath     unknown         Current Outpatient Medications:   •  aspirin 81 MG EC tablet, Take 81 mg by mouth Daily., Disp: , Rfl:   •  bethanechol (URECHOLINE) 10 MG tablet, Take 10 mg by mouth 3 (Three) Times a Day., Disp: , Rfl:   •  Cholecalciferol (Vitamin D3) 1.25 MG (14288 UT) capsule, Take 1 capsule by mouth Every 7 (Seven) Days., Disp: 12 capsule, Rfl: 3  •  clonazePAM (KlonoPIN) 2 MG tablet, TK 1/2 T PO QID PRN, Disp: , Rfl:   •  colesevelam (WELCHOL) 625 MG tablet, TAKE 3 TABLETS BY MOUTH EVERY MORNING TO PREVENT DIARRHEA, Disp: , Rfl:   •  desvenlafaxine (PRISTIQ) 50 MG 24 hr tablet, Take 50 mg by mouth Daily., Disp: , Rfl:   •  diphenoxylate-atropine (LOMOTIL) 2.5-0.025 MG per tablet, TAKE 1 TABLET BY MOUTH THREE TIMES DAILY AS NEEDED FOR DIARRHEA, Disp: 90 tablet, Rfl: 2  •  ondansetron ODT (ZOFRAN-ODT) 8 MG disintegrating tablet, Take 8 mg by mouth., Disp: , Rfl:   •  pantoprazole (PROTONIX) 40 MG EC tablet, Take 40 mg by mouth., Disp: , Rfl:   •  promethazine (PHENERGAN) 25 MG tablet, Take 25 mg by mouth., Disp: , Rfl:   •  temazepam (RESTORIL) 30 MG capsule, Take 30 mg by mouth every night at bedtime., Disp: , Rfl:   •  Xarelto 2.5 MG tablet, Take 2.5 mg by mouth 2 (Two) Times a Day., Disp: , Rfl:   •  acetaminophen-codeine (TYLENOL #3) 300-30 MG per tablet, Take 1 tablet by mouth 4 (Four) Times a Day As Needed. for pain, Disp: , Rfl:   •  clopidogrel (PLAVIX) 75 MG tablet, Take 75 mg by mouth Daily., Disp: , Rfl:   •  oxyCODONE-acetaminophen (PERCOCET) 5-325 MG per tablet, Take 1 tablet by  mouth Every 6 (Six) Hours As Needed. for pain, Disp: , Rfl:     Past Medical History:   Diagnosis Date   • Abdominal pain, epigastric    • Abdominal pain, lower    • Acute bronchitis    • Adrenal mass, left ([facility]) 2008    1.4 cm as of 11/21, outside records indicate present since '08 with little change over that time frame   • Anxiety    • Arthritis    • Carpal tunnel syndrome    • Cervical spine disease    • Circadian rhythm sleep disorder    • COVID-19 virus infection 12/28/2021   • Decreased appetite 01/2022    started with COVID infection late Dec '21   • Depression    • Difficulty walking    • Elevated amylase    • Esophageal spasm    • Former smoker     quit '12   • JANEY (generalized anxiety disorder)    • Gastroparesis 06/2022    mild grade 1 found at this time   • GERD without esophagitis    • Headache    • History of colon polyps    • Insomnia    • Irritable bowel syndrome with diarrhea 1990   • Long COVID 05/2022    pt has mxl c/o that fall under this syndrome   • Overactive bladder    • Pill dysphagia    • PVD (peripheral vascular disease) ([facility]) 11/2021    noted on imaging done for other reasons, s/p mxl stents   • Raynaud's phenomenon    • Rectal sphincter incontinence    • Shingles    • Stroke ([facility])    • TIA (transient ischemic attack) 2019   • Unintentional weight loss 01/2022    started with COVID infection late Dec '21   • Vision loss    • Vitamin D deficiency        Past Surgical History:   Procedure Laterality Date   • ANGIOPLASTY / STENTING ILIAC Bilateral 12/2021    mxl stents from level of iliac into femoral arteries   • CARPAL TUNNEL RELEASE Right 09/08/2021    Procedure: RIGHT CARPAL TUNNEL RELEASE, WITH SYNOVECTOMY, RIGHT VOLAR WRIST EXCISCION OF VOLAR GANGLION CYST;  Surgeon: Miranda Luke MD;  Location: [facility] [place] MAIN OR;  Service: Plastics;  Laterality: Right;   • CERVICAL DISCECTOMY ANTERIOR     • CHOLECYSTECTOMY  2000   • COLONOSCOPY  12/2015    13 polyps removed   • NECK SURGERY     •  TOOTH EXTRACTION     • TUBAL ABDOMINAL LIGATION     • UPPER GASTROINTESTINAL ENDOSCOPY      [facility]  - normal per patient   • WRIST GANGLION EXCISION Right 2021    Procedure: RIGHT WRIST VOLAR GANGLION EXCISION;  Surgeon: Miranda Luke MD;  Location: [facility] MAIN OR;  Service: Plastics;  Laterality: Right;       Family History   Problem Relation Age of Onset   • Arthritis Mother    • Aneurysm Mother    • Heart disease Mother    • Hypertension Mother    • Seizures Mother    • Stroke Mother    • Arthritis Father    • Heart disease Father    • Hypertension Father    • No Known Problems Other    • Arthritis Maternal Grandmother    • Cancer Maternal Grandmother    • Heart disease Maternal Grandmother    • Hypertension Maternal Grandmother    • No Known Problems Maternal Grandfather    • No Known Problems Paternal Grandmother    • No Known Problems Paternal Grandfather    • Ovarian cancer Maternal Aunt    • Breast cancer Neg Hx        Social History     Tobacco Use   • Smoking status: Former     Packs/day: 1.00     Years: 30.00     Pack years: 30.00     Types: Cigarettes     Quit date: 2014     Years since quittin.3   • Smokeless tobacco: Never   Substance Use Topics   • Alcohol use: Not Currently     Comment: social alcohol use//cafeine 2 cups of coffee 1 glass of tea daily             Objective     Vitals:    23 0757   BP: 110/80   Pulse: 84   Temp: 98.6 °F (37 °C)   SpO2: 97%     Body mass index is 17.33 kg/m².    Physical Exam  Vitals reviewed.   Constitutional:       Appearance: She is well-developed. She is not diaphoretic.   HENT:      Head: Normocephalic and atraumatic.   Eyes:      General: No scleral icterus.     Pupils: Pupils are equal, round, and reactive to light.   Neck:      Thyroid: No thyromegaly.   Cardiovascular:      Rate and Rhythm: Normal rate and regular rhythm.      Heart sounds: No murmur heard.    No friction rub. No gallop.   Pulmonary:      Effort:  Pulmonary effort is normal. No respiratory distress.      Breath sounds: No wheezing or rales.   Chest:      Chest wall: No tenderness.   Abdominal:      General: Bowel sounds are normal. There is no distension.      Palpations: Abdomen is soft.      Tenderness: There is no abdominal tenderness.   Musculoskeletal:         General: No deformity. Normal range of motion.   Lymphadenopathy:      Cervical: No cervical adenopathy.   Skin:     General: Skin is warm and dry.      Findings: No rash.   Neurological:      Cranial Nerves: No cranial nerve deficit.      Motor: No abnormal muscle tone.         Lab Results   Component Value Date    GLUCOSE 89 07/01/2022    BUN 13 07/01/2022    CREATININE 0.80 07/01/2022    EGFRIFNONA 70 10/19/2021    EGFRIFAFRI 80 10/19/2021    BCR 16.3 07/01/2022    K 4.1 07/01/2022    CO2 30.6 (H) 07/01/2022    CALCIUM 9.0 07/01/2022    PROTENTOTREF 6.9 05/19/2022    ALBUMIN 4.7 05/19/2022    LABIL2 2.1 05/19/2022    AST 20 05/19/2022    ALT 13 05/19/2022       WBC   Date Value Ref Range Status   06/09/2022 11.81 (H) 4.5 - 11.0 10*3/uL Final     RBC   Date Value Ref Range Status   06/09/2022 4.43 4.0 - 5.2 10*6/uL Final     Hemoglobin   Date Value Ref Range Status   06/09/2022 12.8 12.0 - 16.0 g/dL Final     Hematocrit   Date Value Ref Range Status   06/09/2022 39.8 36.0 - 46.0 % Final     MCV   Date Value Ref Range Status   06/09/2022 89.8 80.0 - 100.0 fL Final     MCH   Date Value Ref Range Status   06/09/2022 28.9 26.0 - 34.0 pg Final     MCHC   Date Value Ref Range Status   06/09/2022 32.2 31.0 - 37.0 g/dL Final     RDW   Date Value Ref Range Status   06/09/2022 13.0 12.0 - 16.8 % Final     MPV   Date Value Ref Range Status   06/09/2022 11.7 8.4 - 12.4 fL Final     Platelets   Date Value Ref Range Status   06/09/2022 231 140 - 440 10*3/uL Final     Neutrophil Rel %   Date Value Ref Range Status   06/09/2022 76.2 45 - 80 % Final     Lymphocyte Rel %   Date Value Ref Range Status   06/09/2022  14.4 (L) 15 - 50 % Final     Monocyte Rel %   Date Value Ref Range Status   06/09/2022 7.8 0 - 15 % Final     Eosinophil %   Date Value Ref Range Status   06/09/2022 0.7 0 - 7 % Final     Basophil Rel %   Date Value Ref Range Status   06/09/2022 0.3 0 - 2 % Final     Immature Grans %   Date Value Ref Range Status   06/09/2022 0.6 0.0 - 1.0 % Final     Neutrophils Absolute   Date Value Ref Range Status   06/09/2022 9.00 (H) 2.0 - 8.8 10*3/uL Final     Lymphocytes Absolute   Date Value Ref Range Status   06/09/2022 1.70 0.7 - 5.5 10*3/uL Final     Monocytes Absolute   Date Value Ref Range Status   06/09/2022 0.92 0.0 - 1.7 10*3/uL Final     Eosinophils Absolute   Date Value Ref Range Status   06/09/2022 0.08 0.0 - 0.8 10*3/uL Final     Basophils Absolute   Date Value Ref Range Status   06/09/2022 0.04 0.0 - 0.2 10*3/uL Final     Immature Grans, Absolute   Date Value Ref Range Status   06/09/2022 0.07 0.00 - 0.10 10*3/uL Final     nRBC   Date Value Ref Range Status   06/09/2022 0 0 /100(WBC) Final       Lab Results   Component Value Date    HGBA1C 6.0 (H) 02/08/2020       Lab Results   Component Value Date    HJTAYRNC37 550 05/19/2022       TSH   Date Value Ref Range Status   07/01/2022 1.060 0.270 - 4.200 uIU/mL Final       No results found for: CHOL  Lab Results   Component Value Date    TRIG 85 05/19/2022     Lab Results   Component Value Date    HDL 65 05/19/2022     Lab Results   Component Value Date    LDL 84 05/19/2022     Lab Results   Component Value Date    VLDL 16 05/19/2022     No results found for: LDLHDL      Procedures    Assessment & Plan   Problems Addressed this Visit     Encounter for hepatitis C screening test for low risk patient    Relevant Orders    Hepatitis C Antibody    Unintentional weight loss - Primary    Relevant Orders    CBC & Differential    Comprehensive Metabolic Panel    SOPHIA, PE & Free LT Chains, Ser    Ambulatory Referral to Nutrition Services    CT Chest With Contrast Diagnostic     CT Abdomen Pelvis With Contrast   Diagnoses       Codes Comments    Unintentional weight loss    -  Primary ICD-10-CM: R63.4  ICD-9-CM: 783.21     Encounter for hepatitis C screening test for low risk patient     ICD-10-CM: Z11.59  ICD-9-CM: V73.89         Unintentional wt loss.  Uncontrolled, chronic.  Mammo utd. C scope utd.  Check CT chest abd pelvis with contrast.  Check CBC, CMP, SPEP.  TSH normal 7/22.     Orders Placed This Encounter   Procedures   • CT Chest With Contrast Diagnostic     Standing Status:   Future     Standing Expiration Date:   1/25/2024   • CT Abdomen Pelvis With Contrast     Standing Status:   Future     Standing Expiration Date:   1/25/2024     Order Specific Question:   Will Oral Contrast be needed for this procedure?     Answer:   Yes   • Hepatitis C Antibody     Order Specific Question:   Release to patient     Answer:   Routine Release   • Comprehensive Metabolic Panel     Order Specific Question:   Release to patient     Answer:   Routine Release   • SOPHIA, PE & Free LT Chains, Ser     Order Specific Question:   Release to patient     Answer:   Routine Release   • Ambulatory Referral to Nutrition Services     Referral Priority:   Routine     Referral Type:   Health Education     Referral Reason:   Specialty Services Required     Number of Visits Requested:   1   • CBC & Differential     Order Specific Question:   Manual Differential     Answer:   No       Current Outpatient Medications   Medication Sig Dispense Refill   • aspirin 81 MG EC tablet Take 81 mg by mouth Daily.     • bethanechol (URECHOLINE) 10 MG tablet Take 10 mg by mouth 3 (Three) Times a Day.     • Cholecalciferol (Vitamin D3) 1.25 MG (88739 UT) capsule Take 1 capsule by mouth Every 7 (Seven) Days. 12 capsule 3   • clonazePAM (KlonoPIN) 2 MG tablet TK 1/2 T PO QID PRN     • colesevelam (WELCHOL) 625 MG tablet TAKE 3 TABLETS BY MOUTH EVERY MORNING TO PREVENT DIARRHEA     • desvenlafaxine (PRISTIQ) 50 MG 24 hr tablet Take  50 mg by mouth Daily.     • diphenoxylate-atropine (LOMOTIL) 2.5-0.025 MG per tablet TAKE 1 TABLET BY MOUTH THREE TIMES DAILY AS NEEDED FOR DIARRHEA 90 tablet 2   • ondansetron ODT (ZOFRAN-ODT) 8 MG disintegrating tablet Take 8 mg by mouth.     • pantoprazole (PROTONIX) 40 MG EC tablet Take 40 mg by mouth.     • promethazine (PHENERGAN) 25 MG tablet Take 25 mg by mouth.     • temazepam (RESTORIL) 30 MG capsule Take 30 mg by mouth every night at bedtime.     • Xarelto 2.5 MG tablet Take 2.5 mg by mouth 2 (Two) Times a Day.     • acetaminophen-codeine (TYLENOL #3) 300-30 MG per tablet Take 1 tablet by mouth 4 (Four) Times a Day As Needed. for pain     • clopidogrel (PLAVIX) 75 MG tablet Take 75 mg by mouth Daily.     • oxyCODONE-acetaminophen (PERCOCET) 5-325 MG per tablet Take 1 tablet by mouth Every 6 (Six) Hours As Needed. for pain       No current facility-administered medications for this visit.       Christie Hendricks had no medications administered during this visit.    Return in about 3 months (around 4/25/2023).    There are no Patient Instructions on file for this visit.

## 2023-01-27 ENCOUNTER — TELEPHONE (OUTPATIENT)
Dept: FAMILY MEDICINE CLINIC | Facility: CLINIC | Age: 63
End: 2023-01-27

## 2023-01-27 DIAGNOSIS — E16.2 HYPOGLYCEMIA: Primary | ICD-10-CM

## 2023-01-27 LAB
ALBUMIN SERPL ELPH-MCNC: 3.8 G/DL (ref 2.9–4.4)
ALBUMIN SERPL-MCNC: 4.5 G/DL (ref 3.5–5.2)
ALBUMIN/GLOB SERPL: 1.4 {RATIO} (ref 0.7–1.7)
ALBUMIN/GLOB SERPL: 2 G/DL
ALP SERPL-CCNC: 85 U/L (ref 39–117)
ALPHA1 GLOB SERPL ELPH-MCNC: 0.3 G/DL (ref 0–0.4)
ALPHA2 GLOB SERPL ELPH-MCNC: 0.7 G/DL (ref 0.4–1)
ALT SERPL-CCNC: 11 U/L (ref 1–33)
AST SERPL-CCNC: 15 U/L (ref 1–32)
B-GLOBULIN SERPL ELPH-MCNC: 0.9 G/DL (ref 0.7–1.3)
BASOPHILS # BLD AUTO: 0.07 10*3/MM3 (ref 0–0.2)
BASOPHILS NFR BLD AUTO: 1.2 % (ref 0–1.5)
BILIRUB SERPL-MCNC: 0.2 MG/DL (ref 0–1.2)
BUN SERPL-MCNC: 8 MG/DL (ref 8–23)
BUN/CREAT SERPL: 8.9 (ref 7–25)
CALCIUM SERPL-MCNC: 9.6 MG/DL (ref 8.6–10.5)
CHLORIDE SERPL-SCNC: 104 MMOL/L (ref 98–107)
CO2 SERPL-SCNC: 33.3 MMOL/L (ref 22–29)
CREAT SERPL-MCNC: 0.9 MG/DL (ref 0.57–1)
EGFRCR SERPLBLD CKD-EPI 2021: 72.4 ML/MIN/1.73
EOSINOPHIL # BLD AUTO: 0.05 10*3/MM3 (ref 0–0.4)
EOSINOPHIL NFR BLD AUTO: 0.8 % (ref 0.3–6.2)
ERYTHROCYTE [DISTWIDTH] IN BLOOD BY AUTOMATED COUNT: 12.6 % (ref 12.3–15.4)
GAMMA GLOB SERPL ELPH-MCNC: 0.9 G/DL (ref 0.4–1.8)
GLOBULIN SER CALC-MCNC: 2.2 GM/DL
GLOBULIN SER-MCNC: 2.9 G/DL (ref 2.2–3.9)
GLUCOSE SERPL-MCNC: 42 MG/DL (ref 65–99)
HCT VFR BLD AUTO: 41.3 % (ref 34–46.6)
HCV AB S/CO SERPL IA: <0.1 S/CO RATIO (ref 0–0.9)
HGB BLD-MCNC: 13.4 G/DL (ref 12–15.9)
IGA SERPL-MCNC: 219 MG/DL (ref 87–352)
IGG SERPL-MCNC: 900 MG/DL (ref 586–1602)
IGM SERPL-MCNC: 66 MG/DL (ref 26–217)
IMM GRANULOCYTES # BLD AUTO: 0.02 10*3/MM3 (ref 0–0.05)
IMM GRANULOCYTES NFR BLD AUTO: 0.3 % (ref 0–0.5)
INTERPRETATION SERPL IEP-IMP: ABNORMAL
KAPPA LC FREE SER-MCNC: 32.8 MG/L (ref 3.3–19.4)
KAPPA LC FREE/LAMBDA FREE SER: 1.6 {RATIO} (ref 0.26–1.65)
LABORATORY COMMENT REPORT: ABNORMAL
LAMBDA LC FREE SERPL-MCNC: 20.5 MG/L (ref 5.7–26.3)
LYMPHOCYTES # BLD AUTO: 1.38 10*3/MM3 (ref 0.7–3.1)
LYMPHOCYTES NFR BLD AUTO: 23.3 % (ref 19.6–45.3)
M PROTEIN SERPL ELPH-MCNC: ABNORMAL G/DL
MCH RBC QN AUTO: 29.5 PG (ref 26.6–33)
MCHC RBC AUTO-ENTMCNC: 32.4 G/DL (ref 31.5–35.7)
MCV RBC AUTO: 90.8 FL (ref 79–97)
MONOCYTES # BLD AUTO: 0.44 10*3/MM3 (ref 0.1–0.9)
MONOCYTES NFR BLD AUTO: 7.4 % (ref 5–12)
NEUTROPHILS # BLD AUTO: 3.97 10*3/MM3 (ref 1.7–7)
NEUTROPHILS NFR BLD AUTO: 67 % (ref 42.7–76)
PLATELET # BLD AUTO: 210 10*3/MM3 (ref 140–450)
POTASSIUM SERPL-SCNC: 4.3 MMOL/L (ref 3.5–5.2)
PROT SERPL-MCNC: 6.7 G/DL (ref 6–8.5)
RBC # BLD AUTO: 4.55 10*6/MM3 (ref 3.77–5.28)
SODIUM SERPL-SCNC: 144 MMOL/L (ref 136–145)
WBC # BLD AUTO: 5.93 10*3/MM3 (ref 3.4–10.8)

## 2023-01-27 RX ORDER — BLOOD-GLUCOSE METER
1 KIT MISCELLANEOUS AS NEEDED
Qty: 1 EACH | Refills: 3 | Status: SHIPPED | OUTPATIENT
Start: 2023-01-27

## 2023-01-27 RX ORDER — LANCETS 28 GAUGE
EACH MISCELLANEOUS
Qty: 100 EACH | Refills: 12 | Status: SHIPPED | OUTPATIENT
Start: 2023-01-27

## 2023-01-27 NOTE — TELEPHONE ENCOUNTER
Caller: Christie Hendricks    Relationship: Self    Best call back number: [phone]    What medication are you requesting: GLUCOSE MONITOR KIT / LANCETS / TEST STRIPS     If a prescription is needed, what is your preferred pharmacy and phone number: [id number] - [place], [place] - [address] - [phone]  - [phone] FX     Additional notes:MAGDA PATHAK

## 2023-02-13 ENCOUNTER — TELEPHONE (OUTPATIENT)
Dept: FAMILY MEDICINE CLINIC | Facility: CLINIC | Age: 63
End: 2023-02-13
Payer: COMMERCIAL

## 2023-02-13 RX ORDER — NITROFURANTOIN 25; 75 MG/1; MG/1
100 CAPSULE ORAL 2 TIMES DAILY
Qty: 14 CAPSULE | Refills: 0 | Status: SHIPPED | OUTPATIENT
Start: 2023-02-13

## 2023-02-13 NOTE — TELEPHONE ENCOUNTER
Dr. Matos this patient came in this morning to get her labs done. She is still having UTI can you send in something for her she did leave a Urine sample I dipped it the results are on your desk.

## 2023-02-22 ENCOUNTER — APPOINTMENT (OUTPATIENT)
Dept: CT IMAGING | Facility: HOSPITAL | Age: 63
End: 2023-02-22
Payer: COMMERCIAL

## 2023-02-22 ENCOUNTER — HOSPITAL ENCOUNTER (OUTPATIENT)
Dept: CT IMAGING | Facility: HOSPITAL | Age: 63
Discharge: HOME | End: 2023-02-22
Admitting: FAMILY MEDICINE
Payer: COMMERCIAL

## 2023-02-22 DIAGNOSIS — R63.4 UNINTENTIONAL WEIGHT LOSS: ICD-10-CM

## 2023-02-22 PROCEDURE — 71260 CT THORAX DX C+: CPT

## 2023-02-22 PROCEDURE — 25010000002 IOPAMIDOL 61 % SOLUTION: Performed by: FAMILY MEDICINE

## 2023-02-22 PROCEDURE — 74177 CT ABD & PELVIS W/CONTRAST: CPT

## 2023-02-22 PROCEDURE — 0 DIATRIZOATE MEGLUMINE & SODIUM PER 1 ML: Performed by: FAMILY MEDICINE

## 2023-02-22 RX ADMIN — DIATRIZOATE MEGLUMINE AND DIATRIZOATE SODIUM 30 ML: 660; 100 LIQUID ORAL; RECTAL at 12:42

## 2023-02-22 RX ADMIN — IOPAMIDOL 85 ML: 612 INJECTION, SOLUTION INTRAVENOUS at 12:42

## 2023-04-26 ENCOUNTER — OFFICE VISIT (OUTPATIENT)
Dept: FAMILY MEDICINE CLINIC | Facility: CLINIC | Age: 63
End: 2023-04-26
Payer: COMMERCIAL

## 2023-04-26 VITALS
WEIGHT: 103.6 LBS | HEART RATE: 97 BPM | TEMPERATURE: 95.9 F | BODY MASS INDEX: 18.36 KG/M2 | SYSTOLIC BLOOD PRESSURE: 106 MMHG | HEIGHT: 63 IN | OXYGEN SATURATION: 98 % | DIASTOLIC BLOOD PRESSURE: 68 MMHG

## 2023-04-26 DIAGNOSIS — K21.9 GERD WITHOUT ESOPHAGITIS: ICD-10-CM

## 2023-04-26 DIAGNOSIS — S39.012A LUMBAR STRAIN, INITIAL ENCOUNTER: ICD-10-CM

## 2023-04-26 DIAGNOSIS — F33.41 RECURRENT MAJOR DEPRESSIVE DISORDER, IN PARTIAL REMISSION: Primary | ICD-10-CM

## 2023-04-26 RX ORDER — CYCLOBENZAPRINE HCL 10 MG
TABLET ORAL
Qty: 20 TABLET | Refills: 0 | Status: SHIPPED | OUTPATIENT
Start: 2023-04-26

## 2023-04-26 RX ORDER — PROMETHAZINE HYDROCHLORIDE 25 MG/1
25 TABLET ORAL
COMMUNITY
Start: 2023-02-13

## 2023-04-26 RX ORDER — PANTOPRAZOLE SODIUM 40 MG/1
40 TABLET, DELAYED RELEASE ORAL DAILY
Qty: 90 TABLET | Refills: 3 | Status: SHIPPED | OUTPATIENT
Start: 2023-04-26 | End: 2024-04-25

## 2023-05-30 ENCOUNTER — HOSPITAL ENCOUNTER (EMERGENCY)
Facility: HOSPITAL | Age: 63
Discharge: HOME | End: 2023-05-30
Attending: EMERGENCY MEDICINE | Admitting: EMERGENCY MEDICINE

## 2023-05-30 ENCOUNTER — APPOINTMENT (OUTPATIENT)
Dept: GENERAL RADIOLOGY | Facility: HOSPITAL | Age: 63
End: 2023-05-30

## 2023-05-30 ENCOUNTER — APPOINTMENT (OUTPATIENT)
Dept: CT IMAGING | Facility: HOSPITAL | Age: 63
End: 2023-05-30

## 2023-05-30 VITALS
RESPIRATION RATE: 16 BRPM | HEIGHT: 63 IN | OXYGEN SATURATION: 94 % | TEMPERATURE: 98.2 F | HEART RATE: 120 BPM | WEIGHT: 106 LBS | BODY MASS INDEX: 18.78 KG/M2 | DIASTOLIC BLOOD PRESSURE: 73 MMHG | SYSTOLIC BLOOD PRESSURE: 148 MMHG
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
## 2023-05-30 DIAGNOSIS — S70.01XA CONTUSION OF RIGHT HIP, INITIAL ENCOUNTER: ICD-10-CM

## 2023-05-30 DIAGNOSIS — S16.1XXA CERVICAL STRAIN, ACUTE, INITIAL ENCOUNTER: ICD-10-CM

## 2023-05-30 DIAGNOSIS — S40.011A CONTUSION OF RIGHT SHOULDER, INITIAL ENCOUNTER: Primary | ICD-10-CM

## 2023-05-30 PROCEDURE — 70450 CT HEAD/BRAIN W/O DYE: CPT

## 2023-05-30 PROCEDURE — 73502 X-RAY EXAM HIP UNI 2-3 VIEWS: CPT

## 2023-05-30 PROCEDURE — 99283 EMERGENCY DEPT VISIT LOW MDM: CPT

## 2023-05-30 PROCEDURE — 73030 X-RAY EXAM OF SHOULDER: CPT

## 2023-05-30 PROCEDURE — 72125 CT NECK SPINE W/O DYE: CPT

## 2023-05-30 RX ORDER — HYDROCODONE BITARTRATE AND ACETAMINOPHEN 5; 325 MG/1; MG/1
1 TABLET ORAL ONCE
Status: COMPLETED | OUTPATIENT
Start: 2023-05-30 | End: 2023-05-30

## 2023-05-30 RX ORDER — CYCLOBENZAPRINE HCL 10 MG
10 TABLET ORAL ONCE
Status: COMPLETED | OUTPATIENT
Start: 2023-05-30 | End: 2023-05-30

## 2023-05-30 RX ORDER — CYCLOBENZAPRINE HCL 10 MG
10 TABLET ORAL 3 TIMES DAILY PRN
Qty: 30 TABLET | Refills: 0 | Status: SHIPPED | OUTPATIENT
Start: 2023-05-30

## 2023-05-30 RX ORDER — HYDROCODONE BITARTRATE AND ACETAMINOPHEN 5; 325 MG/1; MG/1
1 TABLET ORAL EVERY 6 HOURS PRN
Status: DISCONTINUED | OUTPATIENT
Start: 2023-05-30 | End: 2023-05-30

## 2023-05-30 RX ORDER — LIDOCAINE 50 MG/G
1 PATCH TOPICAL EVERY 24 HOURS
Qty: 6 EACH | Refills: 0 | Status: SHIPPED | OUTPATIENT
Start: 2023-05-30

## 2023-05-30 RX ADMIN — HYDROCODONE BITARTRATE AND ACETAMINOPHEN 1 TABLET: 5; 325 TABLET ORAL at 01:45

## 2023-05-30 RX ADMIN — CYCLOBENZAPRINE 10 MG: 10 TABLET, FILM COATED ORAL at 02:53

## 2023-05-30 NOTE — ED PROVIDER NOTES
MD ATTESTATION NOTE    The HEIDI and I have discussed this patient's history, physical exam, and treatment plan.  I have reviewed the documentation and personally had a face to face interaction with the patient. I affirm the documentation and agree with the treatment and plan.  The attached note describes my personal findings.      I provided a substantive portion of the care of the patient.  I personally performed the physical exam in its entirety, and below are my findings.  For this patient encounter, the patient wore surgical mask, I wore full protective PPE including N95 and eye protection.      Brief HPI: This patient is a 62-year-old female presenting to the emergency room today with discomfort to her neck as well as right shoulder and hip following a fall sustained while at home.  She does take Xarelto on a daily basis.  She denies headache, nausea/vomiting, or any associated loss of consciousness.      PHYSICAL EXAM  ED Triage Vitals [05/30/23 0127]   Temp Heart Rate Resp BP SpO2   98.2 °F (36.8 °C) 120 16 148/73 94 %      Temp src Heart Rate Source Patient Position BP Location FiO2 (%)   Tympanic Monitor Sitting Right arm --         GENERAL: Resting comfortably and in no acute distress, nontoxic in appearance  HENT: nares patent  EYES: no scleral icterus  CV: regular rhythm, normal rate, no M/R/G  RESPIRATORY: normal effort, lungs clear bilaterally  ABDOMEN: soft, nontender, no rebound or guarding,  MUSCULOSKELETAL: no deformity midline and paraspinous cervical spine tenderness without step-off or deformity  NEURO: alert, moves all extremities, follows commands  PSYCH:  calm, cooperative  SKIN: warm, dry    Vital signs and nursing notes reviewed.        Plan: We will obtain a CT scan of the patient's head and cervical spine as well as x-rays of the right shoulder and right hip.  We will monitor and reassess following.       Larry Gray MD  05/30/23 0315

## 2023-05-30 NOTE — Clinical Note
[facility] EMERGENCY DEPARTMENT  [address] [facility] [address]  Phone: [phone]    Christie Hendricks was seen and treated in our emergency department on 5/30/2023.  She may return to work on 05/31/2023.         Thank you for choosing [facility].    Mer May PA

## 2023-05-30 NOTE — ED PROVIDER NOTES
EMERGENCY DEPARTMENT ENCOUNTER    Room Number:  06/06  Date of encounter:  5/30/2023  PCP: Trino Matos MD  Patient Care Team:  Trino Matos MD as PCP - General (Family Medicine)   Independent Historians: Patient    HPI:  Chief Complaint: Fall  A complete HPI/ROS/PMH/PSH/SH/FH are unobtainable due to: None    Chronic or social conditions impacting patient care (social determinants of health): None    Context: Christie Hendricks is a 62 y.o. female who presents to the ED c/o pain after a fall down steps at her home 2 days ago.  Patient states she had become lightheaded due to hypoglycemia which she has been experiencing intermittently for some time.  She states she was going downstairs to get something to eat to help her symptoms when she fell.  She did not lose consciousness.  She does take Xarelto.  She has had continued neck pain, right shoulder pain and right hip pain.  She has been taking Tylenol at home for pain with mild relief.  She has been able to ambulate with pain but has mostly been staying in her bed.    Review of prior external notes (non-ED): Office visit with primary care provider Dr. Matos on 4/26/2023.  Patient seen for low back pain after a fall.  Prescribed muscle relaxer.    Review of prior external test results outside of this encounter: Labs from 1/25/2023 were reviewed.  Patient was hypoglycemic with glucose of 42.  Electrolytes otherwise normal, normal renal function, normal LFTs.  CBC from same date was unremarkable.    PAST MEDICAL HISTORY  Active Ambulatory Problems     Diagnosis Date Noted   • Rectal sphincter incontinence    • Raynaud's phenomenon    • Pill dysphagia    • Pancreatic disorder    • Irritable bowel syndrome with diarrhea    • Insomnia    • Hyperactivity of bladder    • GERD without esophagitis    • JANEY (generalized anxiety disorder)    • Chronic fatigue    • Esophageal spasm    • Depression    • Circadian rhythm sleep disorder    • Carpal tunnel syndrome     • Anxiety    • Right arm numbness 03/02/2020   • Deltoid tendonitis, right 03/05/2020   • Acute bronchitis due to other specified organisms 04/06/2020   • Mild mental slowing 04/08/2020   • Rotator cuff tendonitis, right 04/08/2020   • TIA (transient ischemic attack) 02/07/2020   • Colon polyps 10/19/2021   • Iliac artery stenosis, bilateral 11/09/2021   • Lung nodules 11/09/2021   • Nausea 01/12/2022   • Vitamin D deficiency 05/19/2022   • Post-COVID syndrome 06/15/2022   • Unintentional weight loss 01/2022   • Encounter for hepatitis C screening test for low risk patient 01/25/2023   • Hypoglycemia 01/27/2023   • Lumbar strain, initial encounter 04/26/2023     Resolved Ambulatory Problems     Diagnosis Date Noted   • Vitamin B deficiency    • Primary insomnia    • Persistent insomnia    • Overactive bladder    • Non-ulcer dyspepsia    • IBS (irritable bowel syndrome)    • History of colon polyps    • Former smoker    • Elevated amylase    • Dysphasia    • Allergic rhinitis    • Adrenal gland anomaly    • Essential hypertension 02/12/2020   • Brain lesion 02/12/2020   • Burn 06/07/2021   • Pap smear for cervical cancer screening 10/19/2021   • Weight loss 10/19/2021   • Fever 01/12/2022   • Upper respiratory infection 01/12/2022   • Vaginal discharge 04/14/2022   • Immunization due 05/19/2022   • Postmenopause 05/19/2022   • Adrenal nodule 06/15/2022   • Adrenal mass, left 11/2021     Past Medical History:   Diagnosis Date   • Abdominal pain, epigastric    • Abdominal pain, lower    • Acute bronchitis    • Arthritis    • Cervical spine disease    • COVID-19 virus infection 12/28/2021   • Decreased appetite 01/2022   • Difficulty walking    • Gastroparesis 06/2022   • Headache    • Long COVID 05/2022   • PVD (peripheral vascular disease) 11/2021   • Shingles    • Stroke    • Vision loss        The patient qualifies to receive the vaccine, but they have not yet received it.    PAST SURGICAL HISTORY  Past Surgical  History:   Procedure Laterality Date   • ANGIOPLASTY / STENTING ILIAC Bilateral 2021    mxl stents from level of iliac into femoral arteries   • CARPAL TUNNEL RELEASE Right 2021    Procedure: RIGHT CARPAL TUNNEL RELEASE, WITH SYNOVECTOMY, RIGHT VOLAR WRIST EXCISCION OF VOLAR GANGLION CYST;  Surgeon: Miranda Luke MD;  [place] [facility] [place]  Service: Plastics;  Laterality: Right;   • CERVICAL DISCECTOMY ANTERIOR     • CHOLECYSTECTOMY     • COLONOSCOPY  2015    13 polyps removed   • NECK SURGERY     • TOOTH EXTRACTION     • TUBAL ABDOMINAL LIGATION     • UPPER GASTROINTESTINAL ENDOSCOPY      [facility]  - normal per patient   • WRIST GANGLION EXCISION Right 2021    Procedure: RIGHT WRIST VOLAR GANGLION EXCISION;  Surgeon: Miranda Luke MD;  Location: [facility];  Service: Plastics;  Laterality: Right;         FAMILY HISTORY  Family History   Problem Relation Age of Onset   • Arthritis Mother    • Aneurysm Mother    • Heart disease Mother    • Hypertension Mother    • Seizures Mother    • Stroke Mother    • Arthritis Father    • Heart disease Father    • Hypertension Father    • No Known Problems Other    • Arthritis Maternal Grandmother    • Cancer Maternal Grandmother    • Heart disease Maternal Grandmother    • Hypertension Maternal Grandmother    • No Known Problems Maternal Grandfather    • No Known Problems Paternal Grandmother    • No Known Problems Paternal Grandfather    • Ovarian cancer Maternal Aunt    • Breast cancer Neg Hx          SOCIAL HISTORY  Social History     Socioeconomic History   • Marital status:    Tobacco Use   • Smoking status: Former     Packs/day: 1.00     Years: 30.00     Pack years: 30.00     Types: Cigarettes     Quit date: 2014     Years since quittin.6   • Smokeless tobacco: Never   Vaping Use   • Vaping Use: Never used   Substance and Sexual Activity   • Alcohol use: Not Currently     Comment: social alcohol  use//cafeine 2 cups of coffee 1 glass of tea daily    • Drug use: No   • Sexual activity: Not Currently     Partners: Male     Birth control/protection: Abstinence         ALLERGIES  Penicillin g and Penicillins        REVIEW OF SYSTEMS  Review of Systems   Constitutional: Negative for chills and fever.   Respiratory: Negative for shortness of breath.    Cardiovascular: Negative for chest pain.   Gastrointestinal: Negative for abdominal pain.   Musculoskeletal: Positive for arthralgias (Right hip, right shoulder) and neck pain. Negative for back pain.   Neurological: Positive for light-headedness (Resolved). Negative for dizziness, weakness and numbness.        All systems reviewed and negative except for those discussed in HPI.       PHYSICAL EXAM    I have reviewed the triage vital signs and nursing notes.    ED Triage Vitals [05/30/23 0127]   Temp Heart Rate Resp BP SpO2   98.2 °F (36.8 °C) 120 16 148/73 94 %      Temp src Heart Rate Source Patient Position BP Location FiO2 (%)   Tympanic Monitor Sitting Right arm --       Physical Exam  GENERAL: alert, very thin, appears to be in pain   SKIN: Warm, dry  HENT: Normocephalic, atraumatic, slightly dry mucous membranes  EYES: no scleral icterus  CV: regular rhythm, regular rate  RESPIRATORY: normal effort, lungs clear  ABDOMEN: soft, nontender, nondistended  MUSCULOSKELETAL: no deformity, tenderness to right proximal humerus, tenderness to the lower cervical spine and right cervical paraspinal musculature, no thoracic or lumbar midline tenderness, mild right lateral hip tenderness without shortening or rotation of the lower extremities  NEURO: alert, moves all extremities, follows commands          LAB RESULTS  No results found for this or any previous visit (from the past 24 hour(s)).    Ordered the above labs and independently reviewed and interpreted the results.        RADIOLOGY  XR Shoulder 2+ View Right    Result Date: 5/30/2023  2 VIEWS RIGHT SHOULDER   HISTORY: Fall down steps 2 days ago  COMPARISON: None available.  FINDINGS: No acute fracture or subluxation of the right shoulder is seen. Patient does have degenerative changes involving the right AC joint.      No acute fracture or subluxation identified.  This report was finalized on 5/30/2023 2:08 AM by Dr. Amanda León M.D.      CT Head Without Contrast    Result Date: 5/30/2023  CT HEAD WITHOUT CONTRAST  HISTORY: Fall. Patient is on blood thinners.  COMPARISON: None available.  TECHNIQUE: Axial CT imaging was obtained through the brain. No IV contrast was administered.  FINDINGS: No acute intracranial hemorrhage is seen. There is atrophy. There is periventricular and deep white matter microangiopathic change. There is no midline shift or mass effect. No calvarial fracture is seen. Paranasal sinuses and mastoid air cells are clear.      No acute intracranial findings.  Radiation dose reduction techniques were utilized, including automated exposure control and exposure modulation based on body size.  This report was finalized on 5/30/2023 2:41 AM by Dr. Amanda León M.D.      CT Cervical Spine Without Contrast    Result Date: 5/30/2023  CT OF THE CERVICAL SPINE  HISTORY: Fall. Neck pain.  COMPARISON: None available.  TECHNIQUE: Axial CT imaging was obtained through the brain. No IV contrast was administered.  FINDINGS: No acute fracture or subluxation of the cervical spine is seen. There is retrolisthesis of C4 on C5. Patient is status post cervical spinal fusion at C5-C6. There is no prevertebral soft tissue swelling. Images through the lung apices demonstrate background emphysematous changes and biapical scarring. There is no prevertebral soft tissue swelling. Patient does appear to have some mild canal narrowing at C4-C5. Additional mild narrowing is noted at C5-C6, with minimal bilateral neural foraminal narrowing.      No acute fracture or subluxation identified.  Radiation dose reduction  techniques were utilized, including automated exposure control and exposure modulation based on body size.  This report was finalized on 5/30/2023 2:45 AM by Dr. Amanda León M.D.      XR Hip With or Without Pelvis 2 - 3 View Right    Result Date: 5/30/2023  AP VIEW THE PELVIS PLUS 2 VIEWS RIGHT HIP  HISTORY: Fall  COMPARISON: 02/22/2023  FINDINGS: No acute fracture or subluxation of the bony pelvis or either hip is seen. There are symmetric degenerative changes involving the hips and SI joints bilaterally. The patient has bilateral iliac stents, as well as embolization coils from prior ovarian vein embolization bilaterally.      No acute fracture or subluxation identified.  This report was finalized on 5/30/2023 2:11 AM by Dr. Amanda León M.D.        I ordered the above noted radiological studies. Independently reviewed and interpreted by me.  See dictation for official radiology interpretation.      PROCEDURES    Procedures      MEDICATIONS GIVEN IN ER    Medications   HYDROcodone-acetaminophen (NORCO) 5-325 MG per tablet 1 tablet (1 tablet Oral Given 5/30/23 0145)   cyclobenzaprine (FLEXERIL) tablet 10 mg (10 mg Oral Given 5/30/23 0253)         PROGRESS, DATA ANALYSIS, CONSULTS, AND MEDICAL DECISION MAKING    All labs have been independently reviewed and interpreted by me.  All radiology studies have been independently reviewed and interpreted by me and discussed with radiologist dictating the report.   EKG's independently reviewed and interpreted by me.  Discussion below represents my analysis of pertinent findings related to patient's condition, differential diagnosis, treatment plan and final disposition.    Differential diagnosis: fracture, dislocation, sprain, strain, contusion    ED Course as of 05/30/23 0305   Tue May 30, 2023   0212 XR Shoulder 2+ View Right  Radiology study independently interpreted by me and my findings are no acute fracture or dislocation.   [DC]   0247 Pt continues to  complain of pain after Norco. Will give flexeril. [DC]   0300 Discussed imaging results with patient. Will have follow up with PCP and orthopedics. Flexeril and lidocaine patches sent to pharmacy. [DC]      ED Course User Index  [DC] Mer May PA           PPE: Patient was placed in face mask in first look. Patient was wearing facemask when I entered the room and throughout our encounter. I wore full protective equipment throughout this patient encounter including a face mask, and gloves. Hand hygiene was performed before donning protective equipment and after removal when leaving the room.        AS OF 03:05 EDT VITALS:    BP - 148/73  HR - 120  TEMP - 98.2 °F (36.8 °C) (Tympanic)  O2 SATS - 94%        DIAGNOSIS  Final diagnoses:   Contusion of right shoulder, initial encounter   Cervical strain, acute, initial encounter   Contusion of right hip, initial encounter   Fall down steps, initial encounter         DISPOSITION  ED Disposition     ED Disposition   Discharge    Condition   Stable    Comment   --                Note Disclaimer: At [facility], we believe that sharing information builds trust and better relationships. You are receiving this note because you recently visited [facility]. It is possible you will see health information before a provider has talked with you about it. This kind of information can be easy to misunderstand. To help you fully understand what it means for your health, we urge you to discuss this note with your provider.       Mer May PA  05/30/23 0302

## 2023-12-21 ENCOUNTER — READMISSION MANAGEMENT (OUTPATIENT)
Dept: CALL CENTER | Facility: HOSPITAL | Age: 63
End: 2023-12-21
Payer: COMMERCIAL

## 2023-12-21 NOTE — OUTREACH NOTE
Prep Survey      Flowsheet Row Responses   [facility] facility patient discharged from? Non-BH   Is LACE score < 7 ? Non-BH Discharge   Eligibility [facility]   Date of Admission 12/19/23   Date of Discharge 12/21/23   Discharge Disposition Home or Self Care   Discharge diagnosis RIGHT CAROTID SUBCLAVIAN BYPASS WITH VASCULAR PLUG OF SUBCLAVIAN ARTERY   Does the patient have one of the following disease processes/diagnoses(primary or secondary)? General Surgery   Prep survey completed? Yes            Lindy SNOWDEN - Registered Nurse

## 2023-12-22 ENCOUNTER — TRANSITIONAL CARE MANAGEMENT TELEPHONE ENCOUNTER (OUTPATIENT)
Dept: CALL CENTER | Facility: HOSPITAL | Age: 63
End: 2023-12-22
Payer: COMMERCIAL

## 2023-12-22 NOTE — OUTREACH NOTE
Call Center TCM Note      Flowsheet Row Responses   [facility] patient discharged from? Non-   Does the patient have one of the following disease processes/diagnoses(primary or secondary)? Other   TCM attempt successful? Yes   Call end time 1145   Discharge diagnosis RIGHT CAROTID SUBCLAVIAN BYPASS WITH VASCULAR PLUG OF SUBCLAVIAN ARTERYn   Meds reviewed with patient/caregiver? Yes   Is the patient having any side effects they believe may be caused by any medication additions or changes? No   Does the patient have all medications ordered at discharge? Yes   Is the patient taking all medications as directed (includes completed medication regime)? Yes   Comments Scheduled hospital f/u appt. with PCP 1/4/24 @ 10:15am.   Does the patient have an appointment with their PCP within 7-14 days of discharge? Yes   Has home health visited the patient within 72 hours of discharge? N/A   Psychosocial issues? No   Did the patient receive a copy of their discharge instructions? Yes   Nursing interventions Reviewed instructions with patient   What is the patient's perception of their health status since discharge? Improving   Is the patient/caregiver able to teach back signs and symptoms related to disease process for when to call PCP? Yes   Is the patient/caregiver able to teach back signs and symptoms related to disease process for when to call 911? Yes   Is the patient/caregiver able to teach back the hierarchy of who to call/visit for symptoms/problems? PCP, Specialist, Home health nurse, Urgent Care, ED, 911 Yes   If the patient is a current smoker, are they able to teach back resources for cessation? Not a smoker   TCM call completed? Yes   Call end time 1145   Would this patient benefit from a Referral to Amb Social Work? No   Is the patient interested in additional calls from an ambulatory ? No            Luh Saldaña RN    12/22/2023, 11:46 EST

## 2024-01-04 ENCOUNTER — OFFICE VISIT (OUTPATIENT)
Dept: FAMILY MEDICINE CLINIC | Facility: CLINIC | Age: 64
End: 2024-01-04
Payer: COMMERCIAL

## 2024-01-04 VITALS
DIASTOLIC BLOOD PRESSURE: 62 MMHG | HEIGHT: 63 IN | BODY MASS INDEX: 18.96 KG/M2 | HEART RATE: 68 BPM | SYSTOLIC BLOOD PRESSURE: 118 MMHG | OXYGEN SATURATION: 98 % | WEIGHT: 107 LBS | RESPIRATION RATE: 17 BRPM

## 2024-01-04 DIAGNOSIS — Z09 HOSPITAL DISCHARGE FOLLOW-UP: Primary | ICD-10-CM

## 2024-01-04 DIAGNOSIS — Q27.8 ABERRANT RIGHT SUBCLAVIAN ARTERY: ICD-10-CM

## 2024-01-04 DIAGNOSIS — I77.1 ILIAC ARTERY STENOSIS, BILATERAL: ICD-10-CM

## 2024-01-04 RX ORDER — METOPROLOL SUCCINATE 25 MG/1
25 TABLET, EXTENDED RELEASE ORAL DAILY
COMMUNITY

## 2024-01-04 NOTE — PROGRESS NOTES
Transitional Care Follow Up Visit  Subjective     Christie Hendricks is a 63 y.o. female who presents for a transitional care management visit.    Within 48 business hours after discharge our office contacted her via telephone to coordinate her care and needs.      I reviewed and discussed the details of that call along with the discharge summary, hospital problems, inpatient lab results, inpatient diagnostic studies, and consultation reports with Christie.     Current outpatient and discharge medications have been reconciled for the patient.  Reviewed by: Trino Matos MD          12/21/2023     3:07 PM   Date of TCM Phone Call   [facility]   Date of Admission 12/19/2023   Date of Discharge 12/21/2023   Discharge Disposition Home or Self Care     Risk for Readmission (LACE) No data recorded    History of Present Illness   Course During Hospital Stay: F/U hospitalization for abberant R subclavian artery causing midesophagus narrowing.  Had R carotid to subclavian bypass with subclavian artery plug 12/19.  D/c 12/21.  On xarelto and aspirin.  Pt states swallowing is better now after swallowing.       The following portions of the patient's history were reviewed and updated as appropriate: allergies, current medications, past family history, past medical history, past social history, past surgical history, and problem list.    Review of Systems   Constitutional:  Negative for activity change, appetite change and fatigue.   HENT:  Negative for hearing loss and postnasal drip.    Eyes:  Negative for discharge and itching.   Respiratory:  Negative for cough and shortness of breath.    Cardiovascular:  Negative for chest pain and leg swelling.   Gastrointestinal:  Negative for abdominal distention and abdominal pain.   Endocrine: Negative for cold intolerance and heat intolerance.   Genitourinary:  Negative for difficulty urinating and flank pain.   Musculoskeletal:  Negative for arthralgias and  myalgias.   Skin:  Negative for color change.   Neurological:  Negative for dizziness and facial asymmetry.   Hematological:  Negative for adenopathy.   Psychiatric/Behavioral:  Negative for agitation and confusion.        Objective   Physical Exam  Vitals reviewed.   Constitutional:       Appearance: She is well-developed. She is not diaphoretic.   HENT:      Head: Normocephalic and atraumatic.   Eyes:      General: No scleral icterus.     Pupils: Pupils are equal, round, and reactive to light.   Neck:      Thyroid: No thyromegaly.   Cardiovascular:      Rate and Rhythm: Normal rate and regular rhythm.      Heart sounds: No murmur heard.     No friction rub. No gallop.   Pulmonary:      Effort: Pulmonary effort is normal. No respiratory distress.      Breath sounds: No wheezing or rales.   Chest:      Chest wall: No tenderness.   Abdominal:      General: Bowel sounds are normal. There is no distension.      Palpations: Abdomen is soft.      Tenderness: There is no abdominal tenderness.   Musculoskeletal:         General: No deformity. Normal range of motion.   Lymphadenopathy:      Cervical: No cervical adenopathy.   Skin:     General: Skin is warm and dry.      Findings: No rash.   Neurological:      Cranial Nerves: No cranial nerve deficit.      Motor: No abnormal muscle tone.         Assessment & Plan   Diagnoses and all orders for this visit:    1. Hospital discharge follow-up (Primary)    2. Iliac artery stenosis, bilateral    3. Aberrant right subclavian artery      HG 11.7 from 2 weeks ago.  Start flintstones with iron daily. Continue meds.

## 2024-04-23 ENCOUNTER — OFFICE VISIT (OUTPATIENT)
Dept: FAMILY MEDICINE CLINIC | Facility: CLINIC | Age: 64
End: 2024-04-23
Payer: COMMERCIAL

## 2024-04-23 VITALS
SYSTOLIC BLOOD PRESSURE: 112 MMHG | RESPIRATION RATE: 18 BRPM | TEMPERATURE: 97.7 F | OXYGEN SATURATION: 95 % | DIASTOLIC BLOOD PRESSURE: 68 MMHG | HEIGHT: 63 IN | BODY MASS INDEX: 20.2 KG/M2 | WEIGHT: 114 LBS | HEART RATE: 64 BPM

## 2024-04-23 DIAGNOSIS — Z87.891 HISTORY OF TOBACCO ABUSE: ICD-10-CM

## 2024-04-23 DIAGNOSIS — I77.1 ILIAC ARTERY STENOSIS, BILATERAL: ICD-10-CM

## 2024-04-23 DIAGNOSIS — D64.89 ANEMIA DUE TO OTHER CAUSE, NOT CLASSIFIED: ICD-10-CM

## 2024-04-23 DIAGNOSIS — Z12.31 BREAST CANCER SCREENING BY MAMMOGRAM: ICD-10-CM

## 2024-04-23 DIAGNOSIS — Z00.00 ENCOUNTER FOR ANNUAL HEALTH EXAMINATION: Primary | ICD-10-CM

## 2024-04-23 DIAGNOSIS — E55.9 VITAMIN D DEFICIENCY: ICD-10-CM

## 2024-04-23 PROCEDURE — 99396 PREV VISIT EST AGE 40-64: CPT | Performed by: FAMILY MEDICINE

## 2024-04-23 PROCEDURE — 99214 OFFICE O/P EST MOD 30 MIN: CPT | Performed by: FAMILY MEDICINE

## 2024-04-23 RX ORDER — PHENOL 1.4 %
AEROSOL, SPRAY (ML) MUCOUS MEMBRANE
COMMUNITY

## 2024-04-23 NOTE — PROGRESS NOTES
Chief Complaint   Patient presents with    Depression    Back Pain    Fatigue    Extremity Weakness     Here for annual.     Subjective   Christie Hendricks is a 63 y.o. female.     DepressionPatient is not experiencing: weight loss and chest pain.  Her past medical history is significant for depression.   Back Pain  Associated symptoms include numbness and weakness. Pertinent negatives include no abdominal pain, bladder incontinence, chest pain, dysuria, fever, pelvic pain or weight loss.   Fatigue  Associated symptoms include fatigue, numbness and weakness. Pertinent negatives include no abdominal pain, chest pain or fever.   Extremity Weakness   Associated symptoms include numbness. Pertinent negatives include no fever.      63 year old WF here for annual.    The following portions of the patient's history were reviewed and updated as appropriate: allergies, current medications, past family history, past medical history, past social history, past surgical history and problem list.      Dentist: UTD  C scope: utd.   Mammo needed.     C/o B leg weakness.  Known PVD.  I walk up 10 steps and my legs feel like noodles.      Review of Systems   Constitutional:  Positive for fatigue. Negative for fever and unexpected weight loss.   Cardiovascular:  Negative for chest pain.   Gastrointestinal:  Negative for abdominal pain.   Genitourinary:  Negative for dysuria, pelvic pain and urinary incontinence.   Musculoskeletal:  Positive for back pain and extremity weakness.   Neurological:  Positive for weakness and numbness.       Patient Active Problem List   Diagnosis    Rectal sphincter incontinence    Raynaud's phenomenon    Pill dysphagia    Pancreatic disorder    Irritable bowel syndrome with diarrhea    Insomnia    Hyperactivity of bladder    GERD without esophagitis    JANEY (generalized anxiety disorder)    History of tobacco abuse    Chronic fatigue    Esophageal spasm    Depression    Circadian rhythm sleep disorder     Carpal tunnel syndrome    Anxiety    Right arm numbness    Deltoid tendonitis, right    Acute bronchitis due to other specified organisms    Mild mental slowing    Rotator cuff tendonitis, right    TIA (transient ischemic attack)    Colon polyps    Iliac artery stenosis, bilateral    Lung nodules    Nausea    Vitamin D deficiency    Post-COVID syndrome    Unintentional weight loss    Encounter for annual health examination    Hypoglycemia    Lumbar strain, initial encounter    Hospital discharge follow-up    Aberrant right subclavian artery    Other specified anemias       Allergies   Allergen Reactions    Penicillin G Rash and Swelling     swelling of tonguehives swelling and difficulty breathing as a child    Penicillins Hives and Shortness Of Breath     unknown         Current Outpatient Medications:     aspirin 81 MG EC tablet, Take 1 tablet by mouth Daily., Disp: , Rfl:     Cholecalciferol (Vitamin D3) 1.25 MG (05436 UT) capsule, TAKE 1 CAPSULE BY MOUTH EVERY 7 DAYS, Disp: 12 capsule, Rfl: 3    clonazePAM (KlonoPIN) 2 MG tablet, TK 1/2 T PO QID PRN, Disp: , Rfl:     colesevelam (WELCHOL) 625 MG tablet, TAKE 3 TABLETS BY MOUTH EVERY MORNING TO PREVENT DIARRHEA, Disp: , Rfl:     cyclobenzaprine (FLEXERIL) 10 MG tablet, Take 1 tablet by mouth 3 (Three) Times a Day As Needed for Muscle Spasms., Disp: 30 tablet, Rfl: 0    desvenlafaxine (PRISTIQ) 50 MG 24 hr tablet, Take 1 tablet by mouth Daily., Disp: , Rfl:     diphenoxylate-atropine (LOMOTIL) 2.5-0.025 MG per tablet, TAKE 1 TABLET BY MOUTH THREE TIMES DAILY AS NEEDED FOR DIARRHEA, Disp: 90 tablet, Rfl: 2    glucose blood test strip, Check BS BID, Disp: 100 each, Rfl: 12    glucose monitor monitoring kit, 1 each As Needed (check blood sugar BID before meals)., Disp: 1 each, Rfl: 3    Lancets (freestyle) lancets, Check blood sugar BID, Disp: 100 each, Rfl: 12    lidocaine (LIDODERM) 5 %, Place 1 patch on the skin as directed by provider Daily. Remove & Discard  patch within 12 hours or as directed by MD, Disp: 6 each, Rfl: 0    Melatonin 10 MG tablet, Take  by mouth., Disp: , Rfl:     metoprolol succinate XL (TOPROL-XL) 25 MG 24 hr tablet, Take 1 tablet by mouth Daily., Disp: , Rfl:     ondansetron ODT (ZOFRAN-ODT) 8 MG disintegrating tablet, Take 1 tablet by mouth., Disp: , Rfl:     pantoprazole (PROTONIX) 40 MG EC tablet, Take 1 tablet by mouth Daily., Disp: 90 tablet, Rfl: 3    promethazine (PHENERGAN) 25 MG tablet, Take 1 tablet by mouth., Disp: , Rfl:     vitamin D3 125 MCG (5000 UT) capsule capsule, Take 1 capsule by mouth Daily., Disp: , Rfl:     Xarelto 2.5 MG tablet, Take 1 tablet by mouth 2 (Two) Times a Day., Disp: , Rfl:     Past Medical History:   Diagnosis Date    Abdominal pain, epigastric     Abdominal pain, lower     Acute bronchitis     Adrenal mass, left 2008    1.4 cm as of 11/21, outside records indicate present since '08 with little change over that time frame    Anxiety     Arthritis     Carpal tunnel syndrome     Cervical spine disease     Circadian rhythm sleep disorder     COVID-19 virus infection 12/28/2021    Decreased appetite 01/2022    started with COVID infection late Dec '21    Depression     Difficulty walking     Elevated amylase     Esophageal spasm     Former smoker     quit '12    JANEY (generalized anxiety disorder)     Gastroparesis 06/2022    mild grade 1 found at this time    GERD without esophagitis     Headache     History of colon polyps     Insomnia     Irritable bowel syndrome with diarrhea 1990    Long COVID 05/2022    pt has mxl c/o that fall under this syndrome    Overactive bladder     Pill dysphagia     PVD (peripheral vascular disease) 11/2021    noted on imaging done for other reasons, s/p mxl stents    Raynaud's phenomenon     Rectal sphincter incontinence     Shingles     Stroke     TIA (transient ischemic attack) 2019    Unintentional weight loss 01/2022    started with COVID infection late Dec '21    Vision loss      Vitamin D deficiency        Past Surgical History:   Procedure Laterality Date    ANGIOPLASTY / STENTING ILIAC Bilateral 2021    mxl stents from level of iliac into femoral arteries    CARPAL TUNNEL RELEASE Right 2021    Procedure: RIGHT CARPAL TUNNEL RELEASE, WITH SYNOVECTOMY, RIGHT VOLAR WRIST EXCISCION OF VOLAR GANGLION CYST;  Surgeon: Miranda Luke MD;  Location: [facility];  Service: Plastics;  Laterality: Right;    CERVICAL DISCECTOMY ANTERIOR      CHOLECYSTECTOMY      COLONOSCOPY  2015    13 polyps removed    NECK SURGERY      TOOTH EXTRACTION      TUBAL ABDOMINAL LIGATION      UPPER GASTROINTESTINAL ENDOSCOPY      [facility]ene  - normal per patient    WRIST GANGLION EXCISION Right 2021    Procedure: RIGHT WRIST VOLAR GANGLION EXCISION;  Surgeon: Miranda Luke MD;  Location: [facility];  Service: Plastics;  Laterality: Right;       Family History   Problem Relation Age of Onset    Arthritis Mother     Aneurysm Mother     Heart disease Mother     Hypertension Mother     Seizures Mother     Stroke Mother     Arthritis Father     Heart disease Father     Hypertension Father     No Known Problems Other     Arthritis Maternal Grandmother     Cancer Maternal Grandmother     Heart disease Maternal Grandmother     Hypertension Maternal Grandmother     No Known Problems Maternal Grandfather     No Known Problems Paternal Grandmother     No Known Problems Paternal Grandfather     Ovarian cancer Maternal Aunt     Breast cancer Neg Hx        Social History     Tobacco Use    Smoking status: Former     Current packs/day: 0.00     Average packs/day: 1 pack/day for 30.0 years (30.0 ttl pk-yrs)     Types: Cigarettes     Start date: 1984     Quit date: 2014     Years since quittin.5    Smokeless tobacco: Never   Substance Use Topics    Alcohol use: Not Currently     Comment: social alcohol use//cafeine 2 cups of coffee 1 glass of tea daily             Objective      Vitals:    04/23/24 0755   BP: 112/68   Pulse: 64   Resp: 18   Temp: 97.7 °F (36.5 °C)   SpO2: 95%     Body mass index is 20.2 kg/m².    Physical Exam  Vitals reviewed.   Constitutional:       Appearance: She is well-developed. She is not diaphoretic.   HENT:      Head: Normocephalic and atraumatic.   Eyes:      General: No scleral icterus.     Pupils: Pupils are equal, round, and reactive to light.   Neck:      Thyroid: No thyromegaly.   Cardiovascular:      Rate and Rhythm: Normal rate and regular rhythm.      Heart sounds: No murmur heard.     No friction rub. No gallop.   Pulmonary:      Effort: Pulmonary effort is normal. No respiratory distress.      Breath sounds: No wheezing or rales.   Chest:      Chest wall: No tenderness.   Abdominal:      General: Bowel sounds are normal. There is no distension.      Palpations: Abdomen is soft.      Tenderness: There is no abdominal tenderness.   Musculoskeletal:         General: No deformity. Normal range of motion.   Lymphadenopathy:      Cervical: No cervical adenopathy.   Skin:     General: Skin is warm and dry.      Findings: No rash.   Neurological:      Cranial Nerves: No cranial nerve deficit.      Motor: No abnormal muscle tone.         Lab Results   Component Value Date    GLUCOSE 42 (C) 01/25/2023    BUN 8 01/25/2023    CREATININE 0.90 01/25/2023    EGFRIFNONA 70 10/19/2021    EGFRIFAFRI >60 06/09/2022    BCR 8.9 01/25/2023    K 4.3 01/25/2023    CO2 33.3 (H) 01/25/2023    CALCIUM 9.6 01/25/2023    PROTENTOTREF 6.7 01/25/2023    ALBUMIN 4.5 01/25/2023    ALBUMIN 3.8 01/25/2023    LABIL2 2.0 01/25/2023    LABIL2 1.4 01/25/2023    AST 15 01/25/2023    ALT 11 01/25/2023       WBC   Date Value Ref Range Status   12/21/2023 7.72 4.5 - 11.0 10*3/uL Final     RBC   Date Value Ref Range Status   12/21/2023 3.93 (L) 4.0 - 5.2 10*6/uL Final     Hemoglobin   Date Value Ref Range Status   12/21/2023 11.7 (L) 12.0 - 16.0 g/dL Final     Hematocrit   Date Value Ref  "Range Status   12/21/2023 36.3 36.0 - 46.0 % Final     MCV   Date Value Ref Range Status   12/21/2023 92.4 80.0 - 100.0 fL Final     MCH   Date Value Ref Range Status   12/21/2023 29.8 26.0 - 34.0 pg Final     MCHC   Date Value Ref Range Status   12/21/2023 32.2 31.0 - 37.0 g/dL Final     RDW   Date Value Ref Range Status   12/21/2023 12.9 12.0 - 16.8 % Final     MPV   Date Value Ref Range Status   12/21/2023 12.2 8.4 - 12.4 fL Final     Platelets   Date Value Ref Range Status   12/21/2023 158 140 - 440 10*3/uL Final     Neutrophil Rel %   Date Value Ref Range Status   12/21/2023 71.6 45 - 80 % Final     Lymphocyte Rel %   Date Value Ref Range Status   12/21/2023 18.8 15 - 50 % Final     Monocyte Rel %   Date Value Ref Range Status   12/21/2023 8.0 0 - 15 % Final     Eosinophil %   Date Value Ref Range Status   12/21/2023 0.8 0 - 7 % Final     Basophil Rel %   Date Value Ref Range Status   12/21/2023 0.5 0 - 2 % Final     Immature Grans %   Date Value Ref Range Status   12/21/2023 0.3 0.0 - 1.0 % Final     Neutrophils Absolute   Date Value Ref Range Status   12/21/2023 5.53 2.0 - 8.8 10*3/uL Final     Lymphocytes Absolute   Date Value Ref Range Status   12/21/2023 1.45 0.7 - 5.5 10*3/uL Final     Monocytes Absolute   Date Value Ref Range Status   12/21/2023 0.62 0.0 - 1.7 10*3/uL Final     Eosinophils Absolute   Date Value Ref Range Status   12/21/2023 0.06 0.0 - 0.8 10*3/uL Final     Basophils Absolute   Date Value Ref Range Status   12/21/2023 0.04 0.0 - 0.2 10*3/uL Final     Immature Grans, Absolute   Date Value Ref Range Status   12/21/2023 0.02 0.00 - 0.10 10*3/uL Final     nRBC   Date Value Ref Range Status   12/21/2023 0 0 /100(WBC) Final       Lab Results   Component Value Date    HGBA1C 6.0 (H) 02/08/2020       Lab Results   Component Value Date    BSPNQKVR00 550 05/19/2022       TSH   Date Value Ref Range Status   07/01/2022 1.060 0.270 - 4.200 uIU/mL Final       No results found for: \"CHOL\"  Lab Results " "  Component Value Date    TRIG 85 05/19/2022     Lab Results   Component Value Date    HDL 65 05/19/2022     Lab Results   Component Value Date    LDL 84 05/19/2022     Lab Results   Component Value Date    VLDL 16 05/19/2022     No results found for: \"LDLHDL\"      Procedures    Assessment & Plan   Problems Addressed this Visit       History of tobacco abuse    Relevant Orders     CT Chest Low Dose Cancer Screening WO    Iliac artery stenosis, bilateral    Relevant Orders    Lipid Panel With / Chol / HDL Ratio    Vitamin D deficiency    Relevant Orders    Vitamin D,25-Hydroxy    Encounter for annual health examination - Primary    Other specified anemias    Relevant Orders    CBC & Differential    Comprehensive Metabolic Panel    Ferritin    Folate    Iron    Vitamin B12     Other Visit Diagnoses       Breast cancer screening by mammogram        Relevant Orders    Mammo Screening Digital Tomosynthesis Bilateral With CAD          Diagnoses         Codes Comments    Encounter for annual health examination    -  Primary ICD-10-CM: Z00.00  ICD-9-CM: V70.0     Anemia due to other cause, not classified     ICD-10-CM: D64.89  ICD-9-CM: 285.8     Vitamin D deficiency     ICD-10-CM: E55.9  ICD-9-CM: 268.9     Iliac artery stenosis, bilateral     ICD-10-CM: I77.1  ICD-9-CM: 447.1     History of tobacco abuse     ICD-10-CM: Z87.891  ICD-9-CM: V15.82     Breast cancer screening by mammogram     ICD-10-CM: Z12.31  ICD-9-CM: V76.12          PVD.  Uncontrolled.   Start crestor 5mg a day.    Anemia with fatigue.  New problem.  Check CBC, CMP, B12, folate.TSH normal 2022. On Feosol with vitamin C 3 times a week.    Vit D def.  Check Vit D level.      Preventive Counseling:  Encouraged to stay active.  Covid vaccine declined.  HEP A UTD.  Colonoscopy UTD.    Dentist UTD.  Optho UTD.      Orders Placed This Encounter   Procedures    Mammo Screening Digital Tomosynthesis Bilateral With CAD     Standing Status:   Future     Standing " Expiration Date:   4/23/2025     Order Specific Question:   Reason for Exam:     Answer:   breast cancer screening     Order Specific Question:   Release to patient     Answer:   Routine Release [[id number]]     CT Chest Low Dose Cancer Screening WO     Standing Status:   Future     Standing Expiration Date:   4/23/2025     Order Specific Question:   Reason for Exam:     Answer:   Smoking Hx     Order Specific Question:   The patient is age 50-80 (Medicare coverage 50-77)     Answer:   63     Order Specific Question:   The patient is a current smoker?     Answer:   No     Order Specific Question:   Is the patient a former smoker who has quit within the last 15 years? (If the answer to this is no they do not meet criteria for this exam)     Answer:   Yes     Order Specific Question:   The number of years since quitting smoking.     Answer:   10     Order Specific Question:   Does the patient have a smoking history of 20 pack-years or greater? (If the answer to this is no they do not meet criteria for this exam)     Answer:   Yes     Order Specific Question:   Actual pack - year smoking history (number):     Answer:   30     Order Specific Question:   Does the patient have any clinical signs/symptoms of lung cancer?     Answer:   No     Order Specific Question:   The patient was engaged in shared decision-making for this test:     Answer:   Yes     Order Specific Question:   Release to patient     Answer:   Routine Release [[id number]]    Comprehensive Metabolic Panel     Order Specific Question:   Release to patient     Answer:   Routine Release [[id number]]    Ferritin     Order Specific Question:   Release to patient     Answer:   Routine Release [[id number]]    Folate     Order Specific Question:   Release to patient     Answer:   Routine Release [[id number]]    Iron     Order Specific Question:   Release to patient     Answer:   Routine Release [[id number]]    Vitamin B12     Order Specific Question:   Release to  patient     Answer:   Routine Release [[id number]]    Lipid Panel With / Chol / HDL Ratio     Order Specific Question:   Release to patient     Answer:   Routine Release [[id number]]    Vitamin D,25-Hydroxy     Order Specific Question:   Release to patient     Answer:   Routine Release [[id number]]    CBC & Differential     Order Specific Question:   Manual Differential     Answer:   No     Order Specific Question:   Release to patient     Answer:   Routine Release [[id number]]       Current Outpatient Medications   Medication Sig Dispense Refill    aspirin 81 MG EC tablet Take 1 tablet by mouth Daily.      Cholecalciferol (Vitamin D3) 1.25 MG (26903 UT) capsule TAKE 1 CAPSULE BY MOUTH EVERY 7 DAYS 12 capsule 3    clonazePAM (KlonoPIN) 2 MG tablet TK 1/2 T PO QID PRN      colesevelam (WELCHOL) 625 MG tablet TAKE 3 TABLETS BY MOUTH EVERY MORNING TO PREVENT DIARRHEA      cyclobenzaprine (FLEXERIL) 10 MG tablet Take 1 tablet by mouth 3 (Three) Times a Day As Needed for Muscle Spasms. 30 tablet 0    desvenlafaxine (PRISTIQ) 50 MG 24 hr tablet Take 1 tablet by mouth Daily.      diphenoxylate-atropine (LOMOTIL) 2.5-0.025 MG per tablet TAKE 1 TABLET BY MOUTH THREE TIMES DAILY AS NEEDED FOR DIARRHEA 90 tablet 2    glucose blood test strip Check BS  each 12    glucose monitor monitoring kit 1 each As Needed (check blood sugar BID before meals). 1 each 3    Lancets (freestyle) lancets Check blood sugar  each 12    lidocaine (LIDODERM) 5 % Place 1 patch on the skin as directed by provider Daily. Remove & Discard patch within 12 hours or as directed by MD 6 each 0    Melatonin 10 MG tablet Take  by mouth.      metoprolol succinate XL (TOPROL-XL) 25 MG 24 hr tablet Take 1 tablet by mouth Daily.      ondansetron ODT (ZOFRAN-ODT) 8 MG disintegrating tablet Take 1 tablet by mouth.      pantoprazole (PROTONIX) 40 MG EC tablet Take 1 tablet by mouth Daily. 90 tablet 3    promethazine (PHENERGAN) 25 MG tablet Take 1  "tablet by mouth.      vitamin D3 125 MCG (5000 UT) capsule capsule Take 1 capsule by mouth Daily.      Xarelto 2.5 MG tablet Take 1 tablet by mouth 2 (Two) Times a Day.       No current facility-administered medications for this visit.       Christie Hendricks \"MARIO ALBERTO\" had no medications administered during this visit.    Return in about 4 months (around 8/23/2024).    There are no Patient Instructions on file for this visit.       "

## 2024-04-24 DIAGNOSIS — E55.9 VITAMIN D DEFICIENCY: ICD-10-CM

## 2024-04-24 PROBLEM — E53.8 FOLATE DEFICIENCY: Status: ACTIVE | Noted: 2024-04-24

## 2024-04-24 LAB
25(OH)D3+25(OH)D2 SERPL-MCNC: 23 NG/ML (ref 30–100)
ALBUMIN SERPL-MCNC: 4.6 G/DL (ref 3.5–5.2)
ALBUMIN/GLOB SERPL: 1.8 G/DL
ALP SERPL-CCNC: 128 U/L (ref 39–117)
ALT SERPL-CCNC: 10 U/L (ref 1–33)
AST SERPL-CCNC: 18 U/L (ref 1–32)
BASOPHILS # BLD AUTO: 0.06 10*3/MM3 (ref 0–0.2)
BASOPHILS NFR BLD AUTO: 1.2 % (ref 0–1.5)
BILIRUB SERPL-MCNC: 0.2 MG/DL (ref 0–1.2)
BUN SERPL-MCNC: 9 MG/DL (ref 8–23)
BUN/CREAT SERPL: 8.4 (ref 7–25)
CALCIUM SERPL-MCNC: 9.9 MG/DL (ref 8.6–10.5)
CHLORIDE SERPL-SCNC: 104 MMOL/L (ref 98–107)
CHOLEST SERPL-MCNC: 198 MG/DL (ref 0–200)
CHOLEST/HDLC SERPL: 3.09 {RATIO}
CO2 SERPL-SCNC: 31.2 MMOL/L (ref 22–29)
CREAT SERPL-MCNC: 1.07 MG/DL (ref 0.57–1)
EGFRCR SERPLBLD CKD-EPI 2021: 58.5 ML/MIN/1.73
EOSINOPHIL # BLD AUTO: 0.1 10*3/MM3 (ref 0–0.4)
EOSINOPHIL NFR BLD AUTO: 2 % (ref 0.3–6.2)
ERYTHROCYTE [DISTWIDTH] IN BLOOD BY AUTOMATED COUNT: 12.5 % (ref 12.3–15.4)
FERRITIN SERPL-MCNC: 45.6 NG/ML (ref 13–150)
FOLATE SERPL-MCNC: 3.78 NG/ML (ref 4.78–24.2)
GLOBULIN SER CALC-MCNC: 2.6 GM/DL
GLUCOSE SERPL-MCNC: 71 MG/DL (ref 65–99)
HCT VFR BLD AUTO: 45.6 % (ref 34–46.6)
HDLC SERPL-MCNC: 64 MG/DL (ref 40–60)
HGB BLD-MCNC: 14.9 G/DL (ref 12–15.9)
IMM GRANULOCYTES # BLD AUTO: 0.01 10*3/MM3 (ref 0–0.05)
IMM GRANULOCYTES NFR BLD AUTO: 0.2 % (ref 0–0.5)
IRON SERPL-MCNC: 53 MCG/DL (ref 37–145)
LDLC SERPL CALC-MCNC: 113 MG/DL (ref 0–100)
LYMPHOCYTES # BLD AUTO: 1.4 10*3/MM3 (ref 0.7–3.1)
LYMPHOCYTES NFR BLD AUTO: 28.3 % (ref 19.6–45.3)
MCH RBC QN AUTO: 29.2 PG (ref 26.6–33)
MCHC RBC AUTO-ENTMCNC: 32.7 G/DL (ref 31.5–35.7)
MCV RBC AUTO: 89.2 FL (ref 79–97)
MONOCYTES # BLD AUTO: 0.36 10*3/MM3 (ref 0.1–0.9)
MONOCYTES NFR BLD AUTO: 7.3 % (ref 5–12)
NEUTROPHILS # BLD AUTO: 3.02 10*3/MM3 (ref 1.7–7)
NEUTROPHILS NFR BLD AUTO: 61 % (ref 42.7–76)
PLATELET # BLD AUTO: 229 10*3/MM3 (ref 140–450)
POTASSIUM SERPL-SCNC: 5.2 MMOL/L (ref 3.5–5.2)
PROT SERPL-MCNC: 7.2 G/DL (ref 6–8.5)
RBC # BLD AUTO: 5.11 10*6/MM3 (ref 3.77–5.28)
SODIUM SERPL-SCNC: 144 MMOL/L (ref 136–145)
TRIGL SERPL-MCNC: 122 MG/DL (ref 0–150)
VIT B12 SERPL-MCNC: 445 PG/ML (ref 211–946)
VLDLC SERPL CALC-MCNC: 21 MG/DL (ref 5–40)
WBC # BLD AUTO: 4.95 10*3/MM3 (ref 3.4–10.8)

## 2024-04-24 RX ORDER — CHOLECALCIFEROL (VITAMIN D3) 1250 MCG
50000 CAPSULE ORAL
Qty: 12 CAPSULE | Refills: 3 | Status: SHIPPED | OUTPATIENT
Start: 2024-04-24

## 2024-04-24 RX ORDER — FOLIC ACID 1 MG/1
1 TABLET ORAL DAILY
Qty: 90 TABLET | Refills: 3 | Status: SHIPPED | OUTPATIENT
Start: 2024-04-24

## 2024-04-26 ENCOUNTER — TELEPHONE (OUTPATIENT)
Dept: FAMILY MEDICINE CLINIC | Facility: CLINIC | Age: 64
End: 2024-04-26

## 2024-04-26 NOTE — TELEPHONE ENCOUNTER
"Caller: Christie Hendricks \"MARIO ALBERTO\"    Relationship: Self    Best call back number: [phone]    What orders are you requesting (i.e. lab or imaging): MAMMOGRAM     In what timeframe would the patient need to come in: ANYTIME     [facility]     Additional notes: PATIENT STATES SHE IS REQUESTING THE ORDER TO GO TO THE PREVIOUS FACILITY SHE HAD HER LAST MAMMOGRAM DONE AT.    PATIENT IS REQUESTING A CALL BACK AND TO LEAVE A VOICEMAIL IF SHE DOES NOT ANSWER.   "

## (undated) DEVICE — DISPOSABLE BIPOLAR FORCEPS 4" (10.2CM) JEWELERS, STRAIGHT 0.4MM TIP AND 12 FT. (3.6M) CABLE: Brand: KIRWAN

## (undated) DEVICE — SUT ETHLN 5/0 PC3 PLSTC 18IN 1865G

## (undated) DEVICE — PREMIUM DRY TRAY LF: Brand: MEDLINE INDUSTRIES, INC.

## (undated) DEVICE — PAD UNDERCAST WYTEX 2IN 4YD LF STRL

## (undated) DEVICE — SPNG GZ 2X2 8PLY NS

## (undated) DEVICE — BNDG ELAS SLF ADHR 2IN 5YD LF

## (undated) DEVICE — TP ELAS ELASTIKON 1IN

## (undated) DEVICE — HAND PACK: Brand: MEDLINE INDUSTRIES, INC.

## (undated) DEVICE — Device: Brand: FABCO

## (undated) DEVICE — SOL ANTISEP SCRB HIBICLENS CHG4PCT 4OZ

## (undated) DEVICE — SPLNT PLSTR 10 FAST 4X15IN

## (undated) DEVICE — GLV SURG BIOGEL LTX PF 6 1/2

## (undated) DEVICE — SUT MONOCRYL 4/0 PS2 27IN Y426H ETY426H

## (undated) DEVICE — SUT MNCRYL 4/0 P3 18IN Y494G

## (undated) DEVICE — 3M™ STERI-STRIP™ REINFORCED ADHESIVE SKIN CLOSURES, R1546, 1/4 IN X 4 IN (6 MM X 100 MM), 10 STRIPS/ENVELOPE: Brand: 3M™ STERI-STRIP™

## (undated) DEVICE — SPLNT PLSTR CAST XFAST 3IN

## (undated) DEVICE — BNDG ELAS SLF ADHR 3IN 5YD LF

## (undated) DEVICE — DISPOSABLE TOURNIQUET CUFF SINGLE BLADDER, SINGLE PORT AND QUICK CONNECT CONNECTOR: Brand: COLOR CUFF